# Patient Record
Sex: FEMALE | Race: WHITE | NOT HISPANIC OR LATINO | Employment: OTHER | RURAL
[De-identification: names, ages, dates, MRNs, and addresses within clinical notes are randomized per-mention and may not be internally consistent; named-entity substitution may affect disease eponyms.]

---

## 2021-11-19 ENCOUNTER — HOSPITAL ENCOUNTER (INPATIENT)
Facility: HOSPITAL | Age: 81
LOS: 17 days | Discharge: OTHER INSTITUTION | DRG: 194 | End: 2021-12-06
Attending: HOSPITALIST | Admitting: HOSPITALIST
Payer: MEDICARE

## 2021-11-19 DIAGNOSIS — I50.9 CONGESTIVE HEART FAILURE: ICD-10-CM

## 2021-11-19 DIAGNOSIS — J18.9 PNEUMONIA: ICD-10-CM

## 2021-11-19 DIAGNOSIS — I50.9 CHRONIC CONGESTIVE HEART FAILURE: ICD-10-CM

## 2021-11-19 DIAGNOSIS — J18.9 PNEUMONIA DUE TO INFECTIOUS ORGANISM, UNSPECIFIED LATERALITY, UNSPECIFIED PART OF LUNG: ICD-10-CM

## 2021-11-19 PROBLEM — I10 ESSENTIAL HYPERTENSION: Chronic | Status: ACTIVE | Noted: 2021-11-19

## 2021-11-19 PROBLEM — Z91.89 AT HIGH RISK FOR VENOUS THROMBOEMBOLISM (VTE): Status: ACTIVE | Noted: 2021-11-19

## 2021-11-19 LAB
FLUAV AG UPPER RESP QL IA.RAPID: NEGATIVE
FLUBV AG UPPER RESP QL IA.RAPID: NEGATIVE
SARS-COV+SARS-COV-2 AG RESP QL IA.RAPID: NEGATIVE

## 2021-11-19 PROCEDURE — 25000003 PHARM REV CODE 250: Performed by: NURSE PRACTITIONER

## 2021-11-19 PROCEDURE — 99900035 HC TECH TIME PER 15 MIN (STAT)

## 2021-11-19 PROCEDURE — 94640 AIRWAY INHALATION TREATMENT: CPT

## 2021-11-19 PROCEDURE — 11000004 HC SNF PRIVATE

## 2021-11-19 PROCEDURE — 25000003 PHARM REV CODE 250: Performed by: HOSPITALIST

## 2021-11-19 PROCEDURE — 25000242 PHARM REV CODE 250 ALT 637 W/ HCPCS: Performed by: NURSE PRACTITIONER

## 2021-11-19 PROCEDURE — 63600175 PHARM REV CODE 636 W HCPCS: Performed by: NURSE PRACTITIONER

## 2021-11-19 PROCEDURE — 27000958

## 2021-11-19 PROCEDURE — 94761 N-INVAS EAR/PLS OXIMETRY MLT: CPT

## 2021-11-19 PROCEDURE — 87428 SARSCOV & INF VIR A&B AG IA: CPT | Performed by: NURSE PRACTITIONER

## 2021-11-19 RX ORDER — ONDANSETRON 4 MG/1
4 TABLET, ORALLY DISINTEGRATING ORAL EVERY 6 HOURS PRN
Status: DISCONTINUED | OUTPATIENT
Start: 2021-11-19 | End: 2021-12-06 | Stop reason: HOSPADM

## 2021-11-19 RX ORDER — AMLODIPINE BESYLATE 10 MG/1
10 TABLET ORAL DAILY
Status: DISCONTINUED | OUTPATIENT
Start: 2021-11-19 | End: 2021-11-29

## 2021-11-19 RX ORDER — ACETAMINOPHEN 325 MG/1
650 TABLET ORAL EVERY 6 HOURS PRN
Status: DISCONTINUED | OUTPATIENT
Start: 2021-11-19 | End: 2021-11-21

## 2021-11-19 RX ORDER — ROPINIROLE 2 MG/1
2 TABLET, FILM COATED ORAL 3 TIMES DAILY
COMMUNITY

## 2021-11-19 RX ORDER — OXYBUTYNIN CHLORIDE 10 MG/1
10 TABLET, EXTENDED RELEASE ORAL DAILY
Status: ON HOLD | COMMUNITY
End: 2021-12-15 | Stop reason: HOSPADM

## 2021-11-19 RX ORDER — ENOXAPARIN SODIUM 100 MG/ML
40 INJECTION SUBCUTANEOUS EVERY 24 HOURS
Status: DISCONTINUED | OUTPATIENT
Start: 2021-11-19 | End: 2021-12-01

## 2021-11-19 RX ORDER — TALC
6 POWDER (GRAM) TOPICAL NIGHTLY PRN
Status: DISCONTINUED | OUTPATIENT
Start: 2021-11-19 | End: 2021-12-06 | Stop reason: HOSPADM

## 2021-11-19 RX ORDER — POTASSIUM CHLORIDE 20 MEQ/1
20 TABLET, EXTENDED RELEASE ORAL DAILY
Status: ON HOLD | COMMUNITY
End: 2021-12-15 | Stop reason: HOSPADM

## 2021-11-19 RX ORDER — CALCIUM CARBONATE 200(500)MG
500 TABLET,CHEWABLE ORAL 2 TIMES DAILY PRN
Status: DISCONTINUED | OUTPATIENT
Start: 2021-11-19 | End: 2021-12-06 | Stop reason: HOSPADM

## 2021-11-19 RX ORDER — HYDRALAZINE HYDROCHLORIDE 25 MG/1
25 TABLET, FILM COATED ORAL 3 TIMES DAILY
Status: DISCONTINUED | OUTPATIENT
Start: 2021-11-19 | End: 2021-12-06 | Stop reason: HOSPADM

## 2021-11-19 RX ORDER — IRBESARTAN 150 MG/1
150 TABLET ORAL DAILY
Status: ON HOLD | COMMUNITY
End: 2021-12-15 | Stop reason: HOSPADM

## 2021-11-19 RX ORDER — CELECOXIB 200 MG/1
200 CAPSULE ORAL 2 TIMES DAILY
Status: ON HOLD | COMMUNITY
End: 2021-12-15 | Stop reason: HOSPADM

## 2021-11-19 RX ORDER — AMOXICILLIN 250 MG
1 CAPSULE ORAL 2 TIMES DAILY PRN
Status: DISCONTINUED | OUTPATIENT
Start: 2021-11-19 | End: 2021-12-06 | Stop reason: HOSPADM

## 2021-11-19 RX ORDER — AMOXICILLIN AND CLAVULANATE POTASSIUM 875; 125 MG/1; MG/1
1 TABLET, FILM COATED ORAL EVERY 12 HOURS
Status: COMPLETED | OUTPATIENT
Start: 2021-11-19 | End: 2021-11-22

## 2021-11-19 RX ORDER — FUROSEMIDE 20 MG/1
20 TABLET ORAL DAILY
Status: ON HOLD | COMMUNITY
End: 2021-12-15 | Stop reason: HOSPADM

## 2021-11-19 RX ORDER — PANTOPRAZOLE SODIUM 40 MG/1
40 TABLET, DELAYED RELEASE ORAL DAILY
Status: DISCONTINUED | OUTPATIENT
Start: 2021-11-20 | End: 2021-12-02

## 2021-11-19 RX ORDER — OXYBUTYNIN CHLORIDE 5 MG/1
10 TABLET, EXTENDED RELEASE ORAL DAILY
Status: DISCONTINUED | OUTPATIENT
Start: 2021-11-20 | End: 2021-12-06 | Stop reason: HOSPADM

## 2021-11-19 RX ORDER — VENLAFAXINE HYDROCHLORIDE 150 MG/1
150 CAPSULE, EXTENDED RELEASE ORAL DAILY
COMMUNITY

## 2021-11-19 RX ORDER — BUDESONIDE 0.5 MG/2ML
0.5 INHALANT ORAL EVERY 12 HOURS
Status: DISCONTINUED | OUTPATIENT
Start: 2021-11-19 | End: 2021-12-06 | Stop reason: HOSPADM

## 2021-11-19 RX ORDER — ALBUTEROL SULFATE 0.83 MG/ML
2.5 SOLUTION RESPIRATORY (INHALATION) EVERY 4 HOURS PRN
Status: DISCONTINUED | OUTPATIENT
Start: 2021-11-19 | End: 2021-12-06 | Stop reason: HOSPADM

## 2021-11-19 RX ORDER — IPRATROPIUM BROMIDE AND ALBUTEROL SULFATE 2.5; .5 MG/3ML; MG/3ML
3 SOLUTION RESPIRATORY (INHALATION)
Status: DISCONTINUED | OUTPATIENT
Start: 2021-11-19 | End: 2021-12-06 | Stop reason: HOSPADM

## 2021-11-19 RX ORDER — VENLAFAXINE HYDROCHLORIDE 75 MG/1
150 CAPSULE, EXTENDED RELEASE ORAL DAILY
Status: DISCONTINUED | OUTPATIENT
Start: 2021-11-20 | End: 2021-12-02

## 2021-11-19 RX ORDER — ROPINIROLE 1 MG/1
2 TABLET, FILM COATED ORAL 3 TIMES DAILY
Status: DISCONTINUED | OUTPATIENT
Start: 2021-11-19 | End: 2021-12-06 | Stop reason: HOSPADM

## 2021-11-19 RX ORDER — ESTRADIOL 1 MG/1
1 TABLET ORAL DAILY
Status: ON HOLD | COMMUNITY
End: 2021-12-15 | Stop reason: HOSPADM

## 2021-11-19 RX ORDER — THYROID 90 MG/1
90 TABLET ORAL
Status: DISCONTINUED | OUTPATIENT
Start: 2021-11-20 | End: 2021-12-06 | Stop reason: HOSPADM

## 2021-11-19 RX ORDER — ESTRADIOL 0.5 MG/1
1 TABLET ORAL DAILY
Status: DISCONTINUED | OUTPATIENT
Start: 2021-11-20 | End: 2021-12-06 | Stop reason: HOSPADM

## 2021-11-19 RX ORDER — AMLODIPINE BESYLATE 5 MG/1
5 TABLET ORAL DAILY
Status: ON HOLD | COMMUNITY
End: 2021-12-15 | Stop reason: HOSPADM

## 2021-11-19 RX ORDER — THYROID 90 MG/1
TABLET ORAL
Status: ON HOLD | COMMUNITY
End: 2021-12-15 | Stop reason: HOSPADM

## 2021-11-19 RX ADMIN — IPRATROPIUM BROMIDE AND ALBUTEROL SULFATE 3 ML: .5; 3 SOLUTION RESPIRATORY (INHALATION) at 06:11

## 2021-11-19 RX ADMIN — HYDRALAZINE HYDROCHLORIDE 25 MG: 25 TABLET ORAL at 05:11

## 2021-11-19 RX ADMIN — HYDRALAZINE HYDROCHLORIDE 25 MG: 25 TABLET ORAL at 08:11

## 2021-11-19 RX ADMIN — ENOXAPARIN SODIUM 40 MG: 40 INJECTION SUBCUTANEOUS at 08:11

## 2021-11-19 RX ADMIN — BUDESONIDE 0.5 MG: 0.5 INHALANT ORAL at 07:11

## 2021-11-19 RX ADMIN — AMOXICILLIN AND CLAVULANATE POTASSIUM 1 TABLET: 875; 125 TABLET, FILM COATED ORAL at 08:11

## 2021-11-19 RX ADMIN — ONDANSETRON 4 MG: 4 TABLET, ORALLY DISINTEGRATING ORAL at 10:11

## 2021-11-19 RX ADMIN — ROPINIROLE 2 MG: 1 TABLET, FILM COATED ORAL at 05:11

## 2021-11-19 RX ADMIN — ROPINIROLE 2 MG: 1 TABLET, FILM COATED ORAL at 08:11

## 2021-11-19 NOTE — PLAN OF CARE
Problem: Adult Inpatient Plan of Care  Goal: Plan of Care Review  Outcome: Ongoing, Progressing  Goal: Patient-Specific Goal (Individualization)  Outcome: Ongoing, Progressing  Goal: Absence of Hospital-Acquired Illness or Injury  Outcome: Ongoing, Progressing  Goal: Optimal Comfort and Wellbeing  Outcome: Ongoing, Progressing  Goal: Readiness for Transition of Care  Outcome: Ongoing, Progressing  Goal: Rounds/Family Conference  Outcome: Ongoing, Progressing     Problem: Infection (Pneumonia)  Goal: Resolution of Infection Signs/Symptoms  Outcome: Ongoing, Progressing     Problem: UTI (Urinary Tract Infection)  Goal: Improved Infection Symptoms  Outcome: Ongoing, Progressing     Problem: Fall Injury Risk  Goal: Absence of Fall and Fall-Related Injury  Outcome: Ongoing, Progressing

## 2021-11-20 LAB
ANION GAP SERPL CALCULATED.3IONS-SCNC: 13 MMOL/L (ref 7–16)
BASOPHILS # BLD AUTO: 0.03 K/UL (ref 0–0.2)
BASOPHILS NFR BLD AUTO: 0.5 % (ref 0–1)
BASOPHILS NFR BLD MANUAL: 1 % (ref 0–1)
BUN SERPL-MCNC: 15 MG/DL (ref 7–18)
BUN/CREAT SERPL: 13 (ref 6–20)
CALCIUM SERPL-MCNC: 8.2 MG/DL (ref 8.5–10.1)
CHLORIDE SERPL-SCNC: 101 MMOL/L (ref 98–107)
CO2 SERPL-SCNC: 23 MMOL/L (ref 21–32)
CREAT SERPL-MCNC: 1.19 MG/DL (ref 0.55–1.02)
DIFFERENTIAL METHOD BLD: ABNORMAL
EOSINOPHIL # BLD AUTO: 0.11 K/UL (ref 0–0.5)
EOSINOPHIL NFR BLD AUTO: 1.8 % (ref 1–4)
EOSINOPHIL NFR BLD MANUAL: 4 % (ref 1–4)
ERYTHROCYTE [DISTWIDTH] IN BLOOD BY AUTOMATED COUNT: 14 % (ref 11.5–14.5)
GLUCOSE SERPL-MCNC: 100 MG/DL (ref 74–106)
HCT VFR BLD AUTO: 32.1 % (ref 38–47)
HGB BLD-MCNC: 10.7 G/DL (ref 12–16)
LYMPHOCYTES # BLD AUTO: 1.8 K/UL (ref 1–4.8)
LYMPHOCYTES NFR BLD AUTO: 30.3 % (ref 27–41)
LYMPHOCYTES NFR BLD MANUAL: 31 % (ref 27–41)
MCH RBC QN AUTO: 31.6 PG (ref 27–31)
MCHC RBC AUTO-ENTMCNC: 33.3 G/DL (ref 32–36)
MCV RBC AUTO: 94.7 FL (ref 80–96)
MONOCYTES # BLD AUTO: 0.74 K/UL (ref 0–0.8)
MONOCYTES NFR BLD AUTO: 12.4 % (ref 2–6)
MONOCYTES NFR BLD MANUAL: 11 % (ref 2–6)
MPC BLD CALC-MCNC: 8.9 FL (ref 9.4–12.4)
NEUTROPHILS # BLD AUTO: 3.27 K/UL (ref 1.8–7.7)
NEUTROPHILS NFR BLD AUTO: 55 % (ref 53–65)
NEUTS SEG NFR BLD MANUAL: 53 % (ref 50–62)
PLATELET # BLD AUTO: 242 K/UL (ref 150–400)
POTASSIUM SERPL-SCNC: 4 MMOL/L (ref 3.5–5.1)
RBC # BLD AUTO: 3.39 M/UL (ref 4.2–5.4)
SODIUM SERPL-SCNC: 133 MMOL/L (ref 136–145)
WBC # BLD AUTO: 5.95 K/UL (ref 4.5–11)

## 2021-11-20 PROCEDURE — 11000004 HC SNF PRIVATE

## 2021-11-20 PROCEDURE — 63600175 PHARM REV CODE 636 W HCPCS: Performed by: NURSE PRACTITIONER

## 2021-11-20 PROCEDURE — 27000958

## 2021-11-20 PROCEDURE — 27000221 HC OXYGEN, UP TO 24 HOURS

## 2021-11-20 PROCEDURE — 80048 BASIC METABOLIC PNL TOTAL CA: CPT | Performed by: NURSE PRACTITIONER

## 2021-11-20 PROCEDURE — 99900035 HC TECH TIME PER 15 MIN (STAT)

## 2021-11-20 PROCEDURE — 25000242 PHARM REV CODE 250 ALT 637 W/ HCPCS: Performed by: NURSE PRACTITIONER

## 2021-11-20 PROCEDURE — 36415 COLL VENOUS BLD VENIPUNCTURE: CPT | Performed by: NURSE PRACTITIONER

## 2021-11-20 PROCEDURE — 85025 COMPLETE CBC W/AUTO DIFF WBC: CPT | Performed by: NURSE PRACTITIONER

## 2021-11-20 PROCEDURE — 94761 N-INVAS EAR/PLS OXIMETRY MLT: CPT

## 2021-11-20 PROCEDURE — 25000003 PHARM REV CODE 250: Performed by: NURSE PRACTITIONER

## 2021-11-20 RX ADMIN — ROPINIROLE 2 MG: 1 TABLET, FILM COATED ORAL at 08:11

## 2021-11-20 RX ADMIN — AMOXICILLIN AND CLAVULANATE POTASSIUM 1 TABLET: 875; 125 TABLET, FILM COATED ORAL at 08:11

## 2021-11-20 RX ADMIN — ROPINIROLE 2 MG: 1 TABLET, FILM COATED ORAL at 02:11

## 2021-11-20 RX ADMIN — HYDRALAZINE HYDROCHLORIDE 25 MG: 25 TABLET ORAL at 02:11

## 2021-11-20 RX ADMIN — HYDRALAZINE HYDROCHLORIDE 25 MG: 25 TABLET ORAL at 08:11

## 2021-11-20 RX ADMIN — ESTRADIOL 1 MG: 0.5 TABLET ORAL at 08:11

## 2021-11-20 RX ADMIN — OXYBUTYNIN CHLORIDE 10 MG: 5 TABLET, EXTENDED RELEASE ORAL at 08:11

## 2021-11-20 RX ADMIN — ENOXAPARIN SODIUM 40 MG: 40 INJECTION SUBCUTANEOUS at 04:11

## 2021-11-20 RX ADMIN — ANTACID TABLETS 500 MG: 500 TABLET, CHEWABLE ORAL at 02:11

## 2021-11-20 RX ADMIN — AMLODIPINE BESYLATE 10 MG: 10 TABLET ORAL at 08:11

## 2021-11-20 RX ADMIN — PANTOPRAZOLE SODIUM 40 MG: 40 TABLET, DELAYED RELEASE ORAL at 08:11

## 2021-11-20 RX ADMIN — VENLAFAXINE HYDROCHLORIDE 150 MG: 75 CAPSULE, EXTENDED RELEASE ORAL at 08:11

## 2021-11-20 NOTE — NURSING
Patient called out experiencing SOB at approximately 1820. O 2 sat 90% on room air. ER practitioner notified. Chest xray ordered along with CBC and BMP. With duobebs and incentive spirometry. Will continue to monitor. No distress noted

## 2021-11-20 NOTE — HOSPITAL COURSE
11/24 Feels groggy this am but no SOB.  Wants to go on pass.  No major issues.   11/26 Patient and family stating patient doesn't seem to be improving and peripheral edema increasing. Review of patient chart indicates a 4 lb weight game over past 2 days. Potassium level down to 3.0. Potassium supplement was changed from tablet to liquid because patient states trouble swallowing tablet; however, nurse states patient does not always drink all her potassium supplement.  11/29 Audra RN reports Ms Pickens and her daughter have concerns today.  Ms Pickens was found resting in bed.  She reports of continued swelling in bilateral legs and thinks may be caused by her BP med being changed from irbesartan to amlodipine.  Also c/o feeling more weak every day. Telemed Consult via audio/ video with Dr Buck via audio/ video, she agrees with changing BP med to ARB and request lab today to include BNP, B12, Vitamin D and TSH.    11/30 asked to see patient for increased edema, inc SOB and O2 use  12/1 States she doesn't feel good, but no other details.  Weight about the same.  Good diuresis overnight.   12/2 Na level still dropping despite fluid restrictions, breathing seems stable   12/03 called to room by JONE Evans LPN, pt reports she fell on bedside table. Pt was found sitting on side of bed when bed alarm activated. She has a bruise noted to the back of right upper arm with a 1 cm skin tear.  The would was cleaned and dressed.  She reports pain to upper arm. Will Xray to r/o fracture.     12/3 A couple of falls now.  Coccyx was fractured from fall on bottom, Arm xrays neg for fracture.   12/04 0730 pt with noted periorbital edema and edema to face and neck.  Reports SOB.  Dr. Valencia was notified by staff and has ordered CT.  Discussed care with Dr. Valencia who asked that we check CT results and consult with on-call MD  12/6 States her stomach is upset this am, breathing seems stable.  Weight is up and Na level is down again  today.    12/6/21 1520 rec'd notification from [facility]  Pt notified and is still agreeable for transfer to further evaluate hyponatremia and pleural effusions.  Dr. Valencia notified.     12/06/21 1556 Dr. Grady called and accepts patient to [facility]  12/06/21 1659 Kristen with [facility] transfer  reports Dr. Grady request patient to go to specialty due to room constraints.  Ladan with Specialty states patient will go to Room 140.

## 2021-11-20 NOTE — SUBJECTIVE & OBJECTIVE
Past Medical History:   Diagnosis Date    Anxiety     Ocasio's esophagus     Depression     GERD (gastroesophageal reflux disease)     Hypothyroidism        Past Surgical History:   Procedure Laterality Date    CATARACT EXTRACTION      CHOLECYSTECTOMY      HYSTERECTOMY         Review of patient's allergies indicates:   Allergen Reactions    Codeine     Lisinopril     Opioids - morphine analogues        No current facility-administered medications on file prior to encounter.     Current Outpatient Medications on File Prior to Encounter   Medication Sig    amLODIPine (NORVASC) 5 MG tablet Take 5 mg by mouth once daily.    celecoxib (CELEBREX) 200 MG capsule Take 200 mg by mouth 2 (two) times daily.    estradioL (ESTRACE) 1 MG tablet Take 1 mg by mouth once daily.    furosemide (LASIX) 20 MG tablet Take 20 mg by mouth once daily.    irbesartan (AVAPRO) 150 MG tablet Take 150 mg by mouth once daily.    oxybutynin (DITROPAN-XL) 10 MG 24 hr tablet Take 10 mg by mouth once daily.    potassium chloride SA (K-DUR,KLOR-CON) 20 MEQ tablet Take 20 mEq by mouth once daily.    rOPINIRole (REQUIP) 2 MG tablet Take 2 mg by mouth 3 (three) times daily.    thyroid, pork, (ARMOUR THYROID) 90 mg Tab Take by mouth before breakfast.    venlafaxine (EFFEXOR-XR) 150 MG Cp24 Take 150 mg by mouth once daily.     Family History    None       Tobacco Use    Smoking status: Never Smoker    Smokeless tobacco: Never Used   Substance and Sexual Activity    Alcohol use: Not Currently    Drug use: Not Currently    Sexual activity: Not Currently     Review of Systems   Constitutional: Negative for chills, fatigue and fever.   HENT: Positive for congestion.    Respiratory: Positive for shortness of breath. Negative for chest tightness.         C/o SOB at times.    Cardiovascular: Negative for chest pain.   Genitourinary: Negative.    Musculoskeletal: Negative.    Skin: Negative for color change.   Neurological: Negative for  dizziness.     Objective:     Vital Signs (Most Recent):  Temp: 98.4 °F (36.9 °C) (11/19/21 1550)  Pulse: 90 (11/19/21 1550)  Resp: 20 (11/19/21 1550)  BP: (!) 159/54 (11/19/21 1550)  SpO2: (!) 93 % (11/19/21 1550) Vital Signs (24h Range):  Temp:  [98.4 °F (36.9 °C)] 98.4 °F (36.9 °C)  Pulse:  [90] 90  Resp:  [20] 20  SpO2:  [93 %] 93 %  BP: (159)/(54) 159/54     Weight: 74.8 kg (164 lb 14.4 oz)  Body mass index is 30.16 kg/m².    Physical Exam  Vitals and nursing note reviewed.   Constitutional:       General: She is awake. She is not in acute distress.     Appearance: Normal appearance. She is overweight.   HENT:      Head: Normocephalic and atraumatic.      Mouth/Throat:      Mouth: Mucous membranes are moist.   Eyes:      General: No scleral icterus.     Extraocular Movements: Extraocular movements intact.      Conjunctiva/sclera: Conjunctivae normal.      Pupils: Pupils are equal, round, and reactive to light.   Cardiovascular:      Rate and Rhythm: Normal rate and regular rhythm.      Heart sounds: No murmur heard.  No friction rub. No gallop.    Pulmonary:      Effort: Pulmonary effort is normal. No respiratory distress.      Breath sounds: No rales.      Comments: Diminished breath sounds bilaterally, no major wheeze.   Abdominal:      General: Abdomen is flat. Bowel sounds are normal. There is no distension.      Palpations: Abdomen is soft.      Tenderness: There is no abdominal tenderness. There is no guarding.   Musculoskeletal:         General: No swelling or tenderness.      Cervical back: Neck supple.   Skin:     General: Skin is warm and dry.      Capillary Refill: Capillary refill takes less than 2 seconds.      Coloration: Skin is not jaundiced.      Findings: No rash.   Neurological:      General: No focal deficit present.      Mental Status: She is alert and oriented to person, place, and time.      Sensory: No sensory deficit.      Motor: No weakness.   Psychiatric:         Mood and Affect: Mood  normal.         Thought Content: Thought content normal.         Judgment: Judgment normal.           CRANIAL NERVES     CN III, IV, VI   Pupils are equal, round, and reactive to light.       Significant Labs: All pertinent labs within the past 24 hours have been reviewed.    Significant Imaging: I have reviewed all pertinent imaging results/findings within the past 24 hours.

## 2021-11-20 NOTE — PLAN OF CARE
Problem: Adult Inpatient Plan of Care  Goal: Plan of Care Review  Outcome: Ongoing, Progressing  Goal: Patient-Specific Goal (Individualization)  Outcome: Ongoing, Progressing  Goal: Absence of Hospital-Acquired Illness or Injury  Outcome: Ongoing, Progressing  Goal: Optimal Comfort and Wellbeing  Outcome: Ongoing, Progressing  Goal: Readiness for Transition of Care  Outcome: Ongoing, Progressing  Goal: Rounds/Family Conference  Outcome: Ongoing, Progressing     Problem: Infection (Pneumonia)  Goal: Resolution of Infection Signs/Symptoms  Outcome: Ongoing, Progressing     Problem: UTI (Urinary Tract Infection)  Goal: Improved Infection Symptoms  Outcome: Ongoing, Progressing     Problem: Fall Injury Risk  Goal: Absence of Fall and Fall-Related Injury  Outcome: Ongoing, Progressing     Problem: Skin Injury Risk Increased  Goal: Skin Health and Integrity  Outcome: Ongoing, Progressing

## 2021-11-20 NOTE — HPI
80 yr old Female admitted for swing-bed following an admit at [facility] with ICU stay since 11/15 for acute heart failure, hypoxemic resp failure, acute renal failure, hyperkalemia, hyponatremia, lactic acidosis, sepsis and anemia.  She continues to take PO augmentin for pneumonia.  Ms Pickens has collins past medical hx of HTN, hypothyroidism, hyperlipidemia, and restless leg syndrome.  She is admitted for medical management and rehab.

## 2021-11-20 NOTE — PLAN OF CARE
Problem: Adult Inpatient Plan of Care  Goal: Absence of Hospital-Acquired Illness or Injury  Outcome: Ongoing, Progressing  Goal: Optimal Comfort and Wellbeing  Outcome: Ongoing, Progressing     Problem: UTI (Urinary Tract Infection)  Goal: Improved Infection Symptoms  Outcome: Ongoing, Progressing     Problem: Fall Injury Risk  Goal: Absence of Fall and Fall-Related Injury  Outcome: Ongoing, Progressing

## 2021-11-20 NOTE — CONSULTS
Pt admit for swing bed. Pt slight SOB. Sao2  94% at rest occasional wheeze slightly coarse  BBS. Orders noted. Respiratory treatments given with incentive spirometer.. Tolerated well with good comprehension.  BBS improved with nonproductive coughs.  Chest xray reviewed.

## 2021-11-21 PROCEDURE — 94640 AIRWAY INHALATION TREATMENT: CPT

## 2021-11-21 PROCEDURE — 27000958

## 2021-11-21 PROCEDURE — 25000003 PHARM REV CODE 250: Performed by: NURSE PRACTITIONER

## 2021-11-21 PROCEDURE — 25000242 PHARM REV CODE 250 ALT 637 W/ HCPCS: Performed by: NURSE PRACTITIONER

## 2021-11-21 PROCEDURE — 94761 N-INVAS EAR/PLS OXIMETRY MLT: CPT

## 2021-11-21 PROCEDURE — 99900035 HC TECH TIME PER 15 MIN (STAT)

## 2021-11-21 PROCEDURE — 63600175 PHARM REV CODE 636 W HCPCS: Performed by: NURSE PRACTITIONER

## 2021-11-21 PROCEDURE — 25000003 PHARM REV CODE 250: Performed by: HOSPITALIST

## 2021-11-21 PROCEDURE — 27000221 HC OXYGEN, UP TO 24 HOURS

## 2021-11-21 PROCEDURE — 11000004 HC SNF PRIVATE

## 2021-11-21 RX ORDER — ACETAMINOPHEN 325 MG/1
650 TABLET ORAL EVERY 6 HOURS PRN
Status: DISCONTINUED | OUTPATIENT
Start: 2021-11-21 | End: 2021-11-21

## 2021-11-21 RX ORDER — ACETAMINOPHEN 325 MG/1
650 TABLET ORAL EVERY 6 HOURS PRN
Status: DISCONTINUED | OUTPATIENT
Start: 2021-11-21 | End: 2021-12-06 | Stop reason: HOSPADM

## 2021-11-21 RX ADMIN — ACETAMINOPHEN 650 MG: 325 TABLET ORAL at 11:11

## 2021-11-21 RX ADMIN — HYDRALAZINE HYDROCHLORIDE 25 MG: 25 TABLET ORAL at 02:11

## 2021-11-21 RX ADMIN — VENLAFAXINE HYDROCHLORIDE 150 MG: 75 CAPSULE, EXTENDED RELEASE ORAL at 08:11

## 2021-11-21 RX ADMIN — ENOXAPARIN SODIUM 40 MG: 40 INJECTION SUBCUTANEOUS at 05:11

## 2021-11-21 RX ADMIN — ESTRADIOL 1 MG: 0.5 TABLET ORAL at 08:11

## 2021-11-21 RX ADMIN — HYDRALAZINE HYDROCHLORIDE 25 MG: 25 TABLET ORAL at 08:11

## 2021-11-21 RX ADMIN — OXYBUTYNIN CHLORIDE 10 MG: 5 TABLET, EXTENDED RELEASE ORAL at 08:11

## 2021-11-21 RX ADMIN — ROPINIROLE 2 MG: 1 TABLET, FILM COATED ORAL at 02:11

## 2021-11-21 RX ADMIN — PANTOPRAZOLE SODIUM 40 MG: 40 TABLET, DELAYED RELEASE ORAL at 08:11

## 2021-11-21 RX ADMIN — IPRATROPIUM BROMIDE AND ALBUTEROL SULFATE 3 ML: .5; 3 SOLUTION RESPIRATORY (INHALATION) at 07:11

## 2021-11-21 RX ADMIN — BUDESONIDE 0.5 MG: 0.5 INHALANT ORAL at 07:11

## 2021-11-21 RX ADMIN — ROPINIROLE 2 MG: 1 TABLET, FILM COATED ORAL at 08:11

## 2021-11-21 RX ADMIN — AMOXICILLIN AND CLAVULANATE POTASSIUM 1 TABLET: 875; 125 TABLET, FILM COATED ORAL at 08:11

## 2021-11-21 RX ADMIN — AMLODIPINE BESYLATE 10 MG: 10 TABLET ORAL at 08:11

## 2021-11-21 RX ADMIN — THYROID 90 MG: 90 TABLET ORAL at 05:11

## 2021-11-21 NOTE — PLAN OF CARE
Goal for resolution of Pneumonia by 11-. Encourage Incentive spirometer to help with deep breathing.

## 2021-11-22 PROCEDURE — 11000004 HC SNF PRIVATE

## 2021-11-22 PROCEDURE — 27000958

## 2021-11-22 PROCEDURE — 97163 PT EVAL HIGH COMPLEX 45 MIN: CPT

## 2021-11-22 PROCEDURE — 27000221 HC OXYGEN, UP TO 24 HOURS

## 2021-11-22 PROCEDURE — 99305 1ST NF CARE MODERATE MDM 35: CPT | Mod: AI,,, | Performed by: HOSPITALIST

## 2021-11-22 PROCEDURE — 97165 OT EVAL LOW COMPLEX 30 MIN: CPT

## 2021-11-22 PROCEDURE — 99900035 HC TECH TIME PER 15 MIN (STAT)

## 2021-11-22 PROCEDURE — 25000242 PHARM REV CODE 250 ALT 637 W/ HCPCS: Performed by: NURSE PRACTITIONER

## 2021-11-22 PROCEDURE — 63600175 PHARM REV CODE 636 W HCPCS: Performed by: NURSE PRACTITIONER

## 2021-11-22 PROCEDURE — 25000003 PHARM REV CODE 250: Performed by: NURSE PRACTITIONER

## 2021-11-22 PROCEDURE — 99305 PR NURSING FACILITY CARE, INIT, MOD SEVERITY: ICD-10-PCS | Mod: AI,,, | Performed by: HOSPITALIST

## 2021-11-22 PROCEDURE — 25000003 PHARM REV CODE 250: Performed by: HOSPITALIST

## 2021-11-22 PROCEDURE — 97110 THERAPEUTIC EXERCISES: CPT

## 2021-11-22 PROCEDURE — 94640 AIRWAY INHALATION TREATMENT: CPT

## 2021-11-22 PROCEDURE — 94761 N-INVAS EAR/PLS OXIMETRY MLT: CPT

## 2021-11-22 RX ORDER — FUROSEMIDE 40 MG/1
40 TABLET ORAL DAILY
Status: DISCONTINUED | OUTPATIENT
Start: 2021-11-22 | End: 2021-11-26

## 2021-11-22 RX ADMIN — VENLAFAXINE HYDROCHLORIDE 150 MG: 75 CAPSULE, EXTENDED RELEASE ORAL at 09:11

## 2021-11-22 RX ADMIN — BUDESONIDE 0.5 MG: 0.5 INHALANT ORAL at 07:11

## 2021-11-22 RX ADMIN — ENOXAPARIN SODIUM 40 MG: 40 INJECTION SUBCUTANEOUS at 04:11

## 2021-11-22 RX ADMIN — ROPINIROLE 2 MG: 1 TABLET, FILM COATED ORAL at 09:11

## 2021-11-22 RX ADMIN — HYDRALAZINE HYDROCHLORIDE 25 MG: 25 TABLET ORAL at 09:11

## 2021-11-22 RX ADMIN — AMOXICILLIN AND CLAVULANATE POTASSIUM 1 TABLET: 875; 125 TABLET, FILM COATED ORAL at 09:11

## 2021-11-22 RX ADMIN — ESTRADIOL 1 MG: 0.5 TABLET ORAL at 09:11

## 2021-11-22 RX ADMIN — IPRATROPIUM BROMIDE AND ALBUTEROL SULFATE 3 ML: .5; 3 SOLUTION RESPIRATORY (INHALATION) at 07:11

## 2021-11-22 RX ADMIN — HYDRALAZINE HYDROCHLORIDE 25 MG: 25 TABLET ORAL at 08:11

## 2021-11-22 RX ADMIN — IPRATROPIUM BROMIDE AND ALBUTEROL SULFATE 3 ML: .5; 3 SOLUTION RESPIRATORY (INHALATION) at 08:11

## 2021-11-22 RX ADMIN — ROPINIROLE 2 MG: 1 TABLET, FILM COATED ORAL at 08:11

## 2021-11-22 RX ADMIN — THYROID 90 MG: 90 TABLET ORAL at 05:11

## 2021-11-22 RX ADMIN — BUDESONIDE 0.5 MG: 0.5 INHALANT ORAL at 08:11

## 2021-11-22 RX ADMIN — IPRATROPIUM BROMIDE AND ALBUTEROL SULFATE 3 ML: .5; 3 SOLUTION RESPIRATORY (INHALATION) at 01:11

## 2021-11-22 RX ADMIN — ACETAMINOPHEN 650 MG: 325 TABLET ORAL at 04:11

## 2021-11-22 RX ADMIN — SENNOSIDES AND DOCUSATE SODIUM 1 TABLET: 8.6; 5 TABLET ORAL at 01:11

## 2021-11-22 RX ADMIN — PANTOPRAZOLE SODIUM 40 MG: 40 TABLET, DELAYED RELEASE ORAL at 09:11

## 2021-11-22 RX ADMIN — FUROSEMIDE 40 MG: 40 TABLET ORAL at 05:11

## 2021-11-22 RX ADMIN — OXYBUTYNIN CHLORIDE 10 MG: 5 TABLET, EXTENDED RELEASE ORAL at 09:11

## 2021-11-22 RX ADMIN — AMLODIPINE BESYLATE 10 MG: 10 TABLET ORAL at 09:11

## 2021-11-22 RX ADMIN — ROPINIROLE 2 MG: 1 TABLET, FILM COATED ORAL at 03:11

## 2021-11-22 RX ADMIN — HYDRALAZINE HYDROCHLORIDE 25 MG: 25 TABLET ORAL at 03:11

## 2021-11-22 NOTE — PLAN OF CARE
Problem: Adult Inpatient Plan of Care  Goal: Plan of Care Review  Outcome: Ongoing, Progressing  Goal: Patient-Specific Goal (Individualization)  Outcome: Ongoing, Progressing  Goal: Absence of Hospital-Acquired Illness or Injury  Outcome: Ongoing, Progressing  Goal: Optimal Comfort and Wellbeing  Outcome: Ongoing, Progressing  Goal: Readiness for Transition of Care  Outcome: Ongoing, Progressing  Goal: Rounds/Family Conference  Outcome: Ongoing, Progressing     Problem: Infection (Pneumonia)  Goal: Resolution of Infection Signs/Symptoms  Outcome: Ongoing, Progressing     Problem: UTI (Urinary Tract Infection)  Goal: Improved Infection Symptoms  Outcome: Ongoing, Progressing

## 2021-11-22 NOTE — PT/OT/SLP EVAL
Occupational Therapy   Evaluation    Name: Courtney Pickens  MRN: [id number]  Admitting Diagnosis:  <principal problem not specified>  Recent Surgery: * No surgery found *      Recommendations:     Discharge Recommendations: home with home health  Discharge Equipment Recommendations:  rollator  Barriers to discharge:  None    Assessment:     Courtney Pickens is a 81 y.o. female with a medical diagnosis of <principal problem not specified>.  She presents with SOB as she completes tasks within her hospital room. Performance deficits affecting function: impaired endurance,weakness,impaired self care skills.      Rehab Prognosis: Good; patient would benefit from  skilled OT services to address these deficits and reach maximum level of function.       Plan:     Patient to be seen 5 x/week to address the above listed problems via self-care/home management,community/work re-entry,therapeutic activities,therapeutic exercises,therapeutic groups  · Plan of Care Expires: 12/03/21  · Plan of Care Reviewed with: patient    Subjective     Chief Complaint: SOB  Patient/Family Comments/goals: get home, get back to normal routine    Occupational Profile:  Living Environment: home alone  Previous level of function: independent, no walking devices but shower chair for bathing  Roles and Routines: mother, grandmother  Equipment Used at Home:  shower chair,grab bar  Assistance upon Discharge: home alone, family intermittently supervises    Pain/Comfort:  · Pain Rating 1: 0/10    Patients cultural, spiritual, Yazidism conflicts given the current situation: no    Objective:     Communicated with: pt prior to session.  Patient found up on side of bed, having just finished breakfast with  (no airway or catheter lines) upon OT entry to room.    General Precautions: Standard, fall   Orthopedic Precautions:Full weight bearing   Braces: N/A  Respiratory Status::   · O2 Device (Oxygen Therapy): room air    Occupational Performance:    Bed  Mobility:    · Mod i    Functional Mobility/Transfers:  · Patient completed Sit <> Stand Transfer with modified independence and supervision  with  observation and safety cues as needed   · Functional Mobility: cruises objects through room for safest dynamic balance    Activities of Daily Living:  · Feeding:  independence no AE  · Grooming: independence no AE  · Bathing: modified independence at sinkside  · Upper Body Dressing: independence no AE  · Lower Body Dressing: minimum assistance very minimal assist to get pants onto L foot due to feet swelling  · Toileting: independence changing pullup and managing clothing    Cognitive/Visual Perceptual:  Cognitive/Psychosocial Skills:     -       Oriented to: Person, Place, Time and Situation   -       Follows Commands/attention:Follows multistep  commands  -       Communication: clear/fluent  -       Memory: No Deficits noted  -       Safety awareness/insight to disability: intact   -       Mood/Affect/Coping skills/emotional control: Appropriate to situation, Cooperative and Pleasant    Physical Exam:  Balance:    -       fair + overall  Dominant hand:    -       right  Upper Extremity Range of Motion:     -       Right Upper Extremity: WFL  -       Left Upper Extremity: WFL  Upper Extremity Strength:    -       Right Upper Extremity: -4/5  -       Left Upper Extremity: -4/5   Strength:    -       Right Upper Extremity: -4/5  -       Left Upper Extremity: -4/5  Fine Motor Coordination:    -       Intact    AMPAC 6 Click ADL:  AMPAC Total Score: 22    Treatment & Education:  eval only today  Education:    Patient left EOB awaiting RT, call bell in reach with call button in reach    GOALS:   Multidisciplinary Problems     Occupational Therapy Goals        Problem: Occupational Therapy Goal    Goal Priority Disciplines Outcome Interventions   Occupational Therapy Goal     OT, PT/OT Ongoing, Progressing    Description: Goals to be met by: d/c     Patient will increase  functional independence with ADLs by performing:    LE Dressing with [place].  Increased functional strength to WFL for all ADL tasks.  Upper extremity exercise program x30 reps per handout, with independence completing chair seated exercises.                     History:     Past Medical History:   Diagnosis Date    Anxiety     Ocasio's esophagus     Depression     GERD (gastroesophageal reflux disease)     Hypothyroidism        Past Surgical History:   Procedure Laterality Date    CATARACT EXTRACTION      CHOLECYSTECTOMY      HYSTERECTOMY         Time Tracking:     OT Date of Treatment: 11/22/21  OT Start Time: 0745  OT Stop Time: 0800  OT Total Time (min): 15 min    Billable Minutes:Evaluation 15    11/22/2021

## 2021-11-22 NOTE — PT/OT/SLP EVAL
Physical Therapy Evaluation    Patient Name:  Courtney Pickens   MRN:  [id number]    Recommendations:     Discharge Recommendations:  home   Discharge Equipment Recommendations: none   Barriers to discharge: None    Assessment:     Courtney Pickens is a 81 y.o. female admitted with a medical diagnosis of Pneumonia.  She presents with the following impairments/functional limitations:  weakness,impaired endurance,gait instability,impaired balance .    Rehab Prognosis: Good; patient would benefit from acute skilled PT services to address these deficits and reach maximum level of function.    Recent Surgery: * No surgery found *      Plan:     During this hospitalization, patient to be seen 5 x/week to address the identified rehab impairments via   and progress toward the following goals:    · Plan of Care Expires:  12/24/21    Subjective     Chief Complaint: I got short of breath  Patient/Family Comments/goals: I want to go home!  Pain/Comfort:  · Pain Rating 1: 0/10  · Pain Rating Post-Intervention 1: 0/10  · Pain Rating 2: 0/10  · Pain Rating Post-Intervention 2: 0/10    Patients cultural, spiritual, Restoration conflicts given the current situation:      Living Environment:    Prior to admission, patients level of function was independent and lives alone.  Equipment used at home: none.    Upon discharge, patient will have assistance from daughter.    Objective:     Communicated with nursing prior to session.  Patient found up in chair with oxygen  upon PT entry to room.    General Precautions: Standard, fall   Orthopedic Precautions:N/A   Braces: N/A  Respiratory Status:  · Flow (L/min): 2    Exams:  · Cognitive Exam:  Patient is oriented to Person, Place, Time and Situation  · RLE ROM: WFL  · RLE Strength: Deficits: 4/5  · LLE ROM: WFL  · LLE Strength: Deficits: 4/5    Functional Mobility:  Bed Mobility:     Supine to Sit: independence  Sit to Supine: independence  Transfers:     Sit to Stand:  contact guard assistance  with hand-held assist  Bed to Chair: contact guard assistance with  hand-held assist  using  Stand Pivot  Gait: 30', Cgax1, no assistive device, slow jason  Wheelchair Propulsion:  Pt propelled Standard wheelchair x 15 feet on Level tile with  Bilateral upper extremity with Supervision or Set-up Assistance.   Tinetti Total Score: 19  < 18 = High Risk of Falls, 19-23 = Moderate Risk of Falls, > 24 = Low Risk of Falls    Therapeutic Activities and Exercises:     Exercises 19839    Heelslide    LAQ    Hip ADD/ABD    Bike 15'   Nustep    sit <>stand            Activities 24915    Nustep    WC self propulsion    transfer training        Gait          AM-PAC 6 CLICK MOBILITY  Total Score:21     Patient left up in chair with call button in reach.    GOALS:   Multidisciplinary Problems     Physical Therapy Goals        Problem: Physical Therapy Goal    Goal Priority Disciplines Outcome Goal Variances Interventions   Physical Therapy Goal     PT, PT/OT Ongoing, Progressing     Description:   Goals     Short Term Goals  2 weeks    1) I HOME EXERCISE PROGRAM  2) I in room transfers  3) Gait 500 feet with no device  and No assistance  4) I Wheel Chair skills    Long Term Goals  4 weeks    1) Discharge to home  2) tolerate 45 mins of activity  3) up/down 10 steps I with rail                      History:     Past Medical History:   Diagnosis Date    Anxiety     Ocasio's esophagus     Depression     GERD (gastroesophageal reflux disease)     Hypothyroidism        Past Surgical History:   Procedure Laterality Date    CATARACT EXTRACTION      CHOLECYSTECTOMY      HYSTERECTOMY         Time Tracking:     PT Received On: 11/22/21  PT Start Time: 1115     PT Stop Time: 1145  PT Total Time (min): 30 min     Billable Minutes: Evaluation 15 and Therapeutic Exercise 15      11/22/2021

## 2021-11-22 NOTE — PLAN OF CARE
Problem: Occupational Therapy Goal  Goal: Occupational Therapy Goal  Description: Goals to be met by: d/c     Patient will increase functional independence with ADLs by performing:    LE Dressing with [place].  Increased functional strength to WFL for all ADL tasks.  Upper extremity exercise program x30 reps per handout, with independence completing chair seated exercises.    Outcome: Ongoing, Progressing

## 2021-11-22 NOTE — PLAN OF CARE
Ms. Pickens arrived from [facility] for swing bed services following an admission for acute respiratory failure with hypoxia/heart failure. Ms. Pickens lives independently at home and has adult children that live close. States she has a straight cane that she uses intermittently, shower chair and her son is installing grab bars in the bathroom. She is followed by DELPH[facility] Mccall in [place] and has been seen within the past month. [facility] in [place], [place] is preferred pharmacy. Has had home health services in the past and would like to use [facility] if home health services are needed at time of discharge. Questions/concerns addressed. Will assess discharge needs throughout the course of stay.

## 2021-11-22 NOTE — H&P
[facility] Surgical Unit  [facility] Medicine  History & Physical    Patient Name: Courtney Pickens  MRN: [id number]  Patient Class: IP- Swing  Admission Date: 11/19/2021  Attending Physician: Austin Valencia DO   Primary Care Provider: Primary Doctor No         Patient information was obtained from patient, past medical records and ER records.     Subjective:     Principal Problem:Pneumonia    Chief Complaint: No chief complaint on file.       HPI: 80 yr old Female admitted for swing-bed following an admit at [facility] with ICU stay since 11/15 for acute heart failure, hypoxemic resp failure, acute renal failure, hyperkalemia, hyponatremia, lactic acidosis, sepsis and anemia.  She continues to take PO augmentin for pneumonia.  Ms Pickens has collins past medical hx of HTN, hypothyroidism, hyperlipidemia, and restless leg syndrome.  She is admitted for medical management and rehab.        Past Medical History:   Diagnosis Date    Anxiety     Ocasio's esophagus     Depression     GERD (gastroesophageal reflux disease)     Hypothyroidism        Past Surgical History:   Procedure Laterality Date    CATARACT EXTRACTION      CHOLECYSTECTOMY      HYSTERECTOMY         Review of patient's allergies indicates:   Allergen Reactions    Codeine     Lisinopril     Opioids - morphine analogues        No current facility-administered medications on file prior to encounter.     Current Outpatient Medications on File Prior to Encounter   Medication Sig    amLODIPine (NORVASC) 5 MG tablet Take 5 mg by mouth once daily.    celecoxib (CELEBREX) 200 MG capsule Take 200 mg by mouth 2 (two) times daily.    estradioL (ESTRACE) 1 MG tablet Take 1 mg by mouth once daily.    furosemide (LASIX) 20 MG tablet Take 20 mg by mouth once daily.    irbesartan (AVAPRO) 150 MG tablet Take 150 mg by mouth once daily.    oxybutynin (DITROPAN-XL) 10 MG 24 hr tablet Take 10 mg by mouth once daily.    potassium chloride SA  (K-DUR,KLOR-CON) 20 MEQ tablet Take 20 mEq by mouth once daily.    rOPINIRole (REQUIP) 2 MG tablet Take 2 mg by mouth 3 (three) times daily.    thyroid, pork, (ARMOUR THYROID) 90 mg Tab Take by mouth before breakfast.    venlafaxine (EFFEXOR-XR) 150 MG Cp24 Take 150 mg by mouth once daily.     Family History    None       Tobacco Use    Smoking status: Never Smoker    Smokeless tobacco: Never Used   Substance and Sexual Activity    Alcohol use: Not Currently    Drug use: Not Currently    Sexual activity: Not Currently     Review of Systems   Constitutional: Negative for chills, fatigue and fever.   HENT: Positive for congestion.    Respiratory: Positive for shortness of breath. Negative for chest tightness.         C/o SOB at times.    Cardiovascular: Negative for chest pain.   Genitourinary: Negative.    Musculoskeletal: Negative.    Skin: Negative for color change.   Neurological: Negative for dizziness.     Objective:     Vital Signs (Most Recent):  Temp: 98.4 °F (36.9 °C) (11/19/21 1550)  Pulse: 90 (11/19/21 1550)  Resp: 20 (11/19/21 1550)  BP: (!) 159/54 (11/19/21 1550)  SpO2: (!) 93 % (11/19/21 1550) Vital Signs (24h Range):  Temp:  [98.4 °F (36.9 °C)] 98.4 °F (36.9 °C)  Pulse:  [90] 90  Resp:  [20] 20  SpO2:  [93 %] 93 %  BP: (159)/(54) 159/54     Weight: 74.8 kg (164 lb 14.4 oz)  Body mass index is 30.16 kg/m².    Physical Exam  Vitals and nursing note reviewed.   Constitutional:       General: She is awake. She is not in acute distress.     Appearance: Normal appearance. She is overweight.   HENT:      Head: Normocephalic and atraumatic.      Mouth/Throat:      Mouth: Mucous membranes are moist.   Eyes:      General: No scleral icterus.     Extraocular Movements: Extraocular movements intact.      Conjunctiva/sclera: Conjunctivae normal.      Pupils: Pupils are equal, round, and reactive to light.   Cardiovascular:      Rate and Rhythm: Normal rate and regular rhythm.      Heart sounds: No murmur  heard.  No friction rub. No gallop.    Pulmonary:      Effort: Pulmonary effort is normal. No respiratory distress.      Breath sounds: No rales.      Comments: Diminished breath sounds bilaterally, no major wheeze.   Abdominal:      General: Abdomen is flat. Bowel sounds are normal. There is no distension.      Palpations: Abdomen is soft.      Tenderness: There is no abdominal tenderness. There is no guarding.   Musculoskeletal:         General: No swelling or tenderness.      Cervical back: Neck supple.   Skin:     General: Skin is warm and dry.      Capillary Refill: Capillary refill takes less than 2 seconds.      Coloration: Skin is not jaundiced.      Findings: No rash.   Neurological:      General: No focal deficit present.      Mental Status: She is alert and oriented to person, place, and time.      Sensory: No sensory deficit.      Motor: No weakness.   Psychiatric:         Mood and Affect: Mood normal.         Thought Content: Thought content normal.         Judgment: Judgment normal.           CRANIAL NERVES     CN III, IV, VI   Pupils are equal, round, and reactive to light.       Significant Labs: All pertinent labs within the past 24 hours have been reviewed.    Significant Imaging: I have reviewed all pertinent imaging results/findings within the past 24 hours.    Assessment/Plan:     * Pneumonia  Continue PO per recs from previous hospital.       Essential hypertension    Continue home meds.  Monitor vitals.    Chronic congestive heart failure  Daily weights.  Used diuretics as needed.       VTE Risk Mitigation (From admission, onward)         Ordered     enoxaparin injection 40 mg  Daily         11/19/21 5395     Place sequential compression device  Until discontinued         11/19/21 1549     IP VTE LOW RISK PATIENT  Once         11/19/21 1549                   Austin Valencia DO  Department of Hospital Medicine   [facility] Surgical Unit

## 2021-11-22 NOTE — PROGRESS NOTES
[facility] Surgical [facility] Medicine  Progress Note    Patient Name: Courtney Pickens  MRN: [id number]  Patient Class: IP- Swing   Admission Date: 11/19/2021  Length of Stay: 3 days  Attending Physician: Austin Valencia DO  Primary Care Provider: Primary Doctor No        Subjective:     Principal Problem:Pneumonia        HPI:  80 yr old Female admitted for swing-bed following an admit at [facility] with ICU stay since 11/15 for acute heart failure, hypoxemic resp failure, acute renal failure, hyperkalemia, hyponatremia, lactic acidosis, sepsis and anemia.  She continues to take PO augmentin for pneumonia.  Ms Pickens has collins past medical hx of HTN, hypothyroidism, hyperlipidemia, and restless leg syndrome.  She is admitted for medical management and rehab.        Overview/Hospital Course:  Pt found resting on bed, c/o SOB.  Noted scattered wheezing throughout and rhonchi to RLL.  Discussed with Dr. Pittman who agrees with plan to give neb tx.  Pt admitted for swingbed for medical management and rehab.      Interval History: 11/19/21 1859     Review of Systems   Constitutional: Positive for fatigue. Negative for appetite change and fever.   Respiratory: Positive for cough and wheezing.    Cardiovascular: Negative for chest pain.   Gastrointestinal: Negative for abdominal pain, diarrhea, nausea and vomiting.   Musculoskeletal: Negative for arthralgias.   Skin: Negative for color change.   Neurological: Negative for headaches.   Psychiatric/Behavioral: Negative for agitation.     Objective:     Vital Signs (Most Recent):  Temp: 98 °F (36.7 °C) (11/22/21 0702)  Pulse: 76 (11/22/21 1357)  Resp: 18 (11/22/21 1357)  BP: (!) 144/55 (11/22/21 0702)  SpO2: 99 % (11/22/21 1357) Vital Signs (24h Range):  Temp:  [98 °F (36.7 °C)-98.7 °F (37.1 °C)] 98 °F (36.7 °C)  Pulse:  [55-78] 76  Resp:  [18-24] 18  SpO2:  [94 %-99 %] 99 %  BP: (140-149)/(52-60) 144/55     Weight: 74.8 kg (164 lb 14.4 oz)  Body mass index  is 30.16 kg/m².    Intake/Output Summary (Last 24 hours) at 11/22/2021 1405  Last data filed at 11/22/2021 1343  Gross per 24 hour   Intake 1200 ml   Output 4 ml   Net 1196 ml      Physical Exam  Constitutional:       General: She is not in acute distress.     Appearance: She is normal weight. She is ill-appearing. She is not toxic-appearing.   HENT:      Mouth/Throat:      Mouth: Mucous membranes are moist.   Cardiovascular:      Rate and Rhythm: Normal rate and regular rhythm.   Pulmonary:      Breath sounds: Wheezing present.   Abdominal:      Palpations: Abdomen is soft.   Skin:     General: Skin is warm and dry.   Neurological:      Mental Status: She is alert and oriented to person, place, and time.         Significant Labs:labs sent from [facility] review   Significant Imaging: imaging sent from [facility] review.        Assessment/Plan:      * Pneumonia  Continue PO per recs from previous hospital.       Essential hypertension    Continue home meds.  Monitor vitals.    Chronic congestive heart failure  Daily weights.  Used diuretics as needed.         VTE Risk Mitigation (From admission, onward)         Ordered     enoxaparin injection 40 mg  Daily         11/19/21 1855     Place sequential compression device  Until discontinued         11/19/21 1549     IP VTE LOW RISK PATIENT  Once         11/19/21 1549                Discharge Planning   DOREEN:      Code Status: Full Code   Is the patient medically ready for discharge?:     Reason for patient still in hospital (select all that apply): Swingbed  Discharge Plan A: Home                  VIOLA Snell  Department of Hospital Medicine   [facility] Surgical [facility]

## 2021-11-22 NOTE — PROGRESS NOTES
Clinical Pharmacy Chart Review Note      Admit Date: 11/19/2021   LOS: 3 days       Courtney Pickens is a 81 y.o. female admitted to SNF for PT/OT after hospitalization for pneumonia    Active Hospital Problems    Diagnosis  POA    *Pneumonia [J18.9]  Yes     Continue augmentin   Add neb pulmicort, duo-neb and prn albuterol      Chronic congestive heart failure [I50.9]  Yes     Monitor lab  Monitor weight  Cardiac diet  Continue home medication        Essential hypertension [I10]  Yes     Chronic     Continue home medication  Monitor VS      At high risk for venous thromboembolism (VTE) [Z91.89]  Yes     Lovenox        Resolved Hospital Problems   No resolved problems to display.     Review of patient's allergies indicates:   Allergen Reactions    Codeine     Lisinopril     Opioids - morphine analogues      Patient Active Problem List    Diagnosis Date Noted    Chronic congestive heart failure 11/19/2021    Pneumonia 11/19/2021    Essential hypertension 11/19/2021    At high risk for venous thromboembolism (VTE) 11/19/2021       Scheduled Meds:    albuterol-ipratropium  3 mL Nebulization Q6H WAKE    amLODIPine  10 mg Oral Daily    budesonide  0.5 mg Nebulization Q12H    enoxaparin  40 mg Subcutaneous Daily    estradioL  1 mg Oral Daily    hydrALAZINE  25 mg Oral TID    oxybutynin  10 mg Oral Daily    pantoprazole  40 mg Oral Daily    rOPINIRole  2 mg Oral TID    thyroid (pork)  90 mg Oral Before breakfast    venlafaxine  150 mg Oral Daily     Continuous Infusions:   PRN Meds: acetaminophen, albuterol sulfate, calcium carbonate, melatonin, ondansetron, senna-docusate 8.6-50 mg    OBJECTIVE:     Vital Signs (Last 24H)  Temp:  [98 °F (36.7 °C)-98.7 °F (37.1 °C)]   Pulse:  [56-78]   Resp:  [18-24]   BP: (140-149)/(52-60)   SpO2:  [94 %-99 %]     Laboratory:  CBC:   Recent Labs   Lab 11/20/21  0409   WBC 5.95   RBC 3.39*   HGB 10.7*   HCT 32.1*      MCV 94.7   MCH 31.6*   MCHC 33.3     BMP:    Recent Labs   Lab 11/20/21  0409      *   K 4.0      CO2 23   BUN 15   CREATININE 1.19*   CALCIUM 8.2*         ASSESSMENT/PLAN:     ECrCl=35.7.  Weekly swingbed medication regimen review complete.  Will request TSH.  Will continue to monitor.    Sarthak Paris, Pharm. D.  Director of Pharmacy  [facility]  [phone]  [email or web address]

## 2021-11-22 NOTE — PLAN OF CARE
Problem: Physical Therapy Goal  Goal: Physical Therapy Goal  Description:   Goals     Short Term Goals  2 weeks    1) I HOME EXERCISE PROGRAM  2) I in room transfers  3) Gait 500 feet with no device  and No assistance  4) I Wheel Chair skills    Long Term Goals  4 weeks    1) Discharge to home  2) tolerate 45 mins of activity  3) up/down 10 steps I with rail     Outcome: Ongoing, Progressing

## 2021-11-22 NOTE — SUBJECTIVE & OBJECTIVE
Interval History: 11/19/21 1859     Review of Systems   Constitutional: Positive for fatigue. Negative for appetite change and fever.   Respiratory: Positive for cough and wheezing.    Cardiovascular: Negative for chest pain.   Gastrointestinal: Negative for abdominal pain, diarrhea, nausea and vomiting.   Musculoskeletal: Negative for arthralgias.   Skin: Negative for color change.   Neurological: Negative for headaches.   Psychiatric/Behavioral: Negative for agitation.     Objective:     Vital Signs (Most Recent):  Temp: 98 °F (36.7 °C) (11/22/21 0702)  Pulse: 76 (11/22/21 1357)  Resp: 18 (11/22/21 1357)  BP: (!) 144/55 (11/22/21 0702)  SpO2: 99 % (11/22/21 1357) Vital Signs (24h Range):  Temp:  [98 °F (36.7 °C)-98.7 °F (37.1 °C)] 98 °F (36.7 °C)  Pulse:  [55-78] 76  Resp:  [18-24] 18  SpO2:  [94 %-99 %] 99 %  BP: (140-149)/(52-60) 144/55     Weight: 74.8 kg (164 lb 14.4 oz)  Body mass index is 30.16 kg/m².    Intake/Output Summary (Last 24 hours) at 11/22/2021 1405  Last data filed at 11/22/2021 1343  Gross per 24 hour   Intake 1200 ml   Output 4 ml   Net 1196 ml      Physical Exam  Constitutional:       General: She is not in acute distress.     Appearance: She is normal weight. She is ill-appearing. She is not toxic-appearing.   HENT:      Mouth/Throat:      Mouth: Mucous membranes are moist.   Cardiovascular:      Rate and Rhythm: Normal rate and regular rhythm.   Pulmonary:      Breath sounds: Wheezing present.   Abdominal:      Palpations: Abdomen is soft.   Skin:     General: Skin is warm and dry.   Neurological:      Mental Status: She is alert and oriented to person, place, and time.         Significant Labs:labs sent from [facility] review   Significant Imaging: imaging sent from [facility].

## 2021-11-23 PROCEDURE — 27000221 HC OXYGEN, UP TO 24 HOURS

## 2021-11-23 PROCEDURE — 97110 THERAPEUTIC EXERCISES: CPT

## 2021-11-23 PROCEDURE — 11000004 HC SNF PRIVATE

## 2021-11-23 PROCEDURE — 94640 AIRWAY INHALATION TREATMENT: CPT

## 2021-11-23 PROCEDURE — 63600175 PHARM REV CODE 636 W HCPCS: Performed by: NURSE PRACTITIONER

## 2021-11-23 PROCEDURE — 97530 THERAPEUTIC ACTIVITIES: CPT

## 2021-11-23 PROCEDURE — 25000003 PHARM REV CODE 250: Performed by: NURSE PRACTITIONER

## 2021-11-23 PROCEDURE — 25000003 PHARM REV CODE 250: Performed by: HOSPITALIST

## 2021-11-23 PROCEDURE — 99900035 HC TECH TIME PER 15 MIN (STAT)

## 2021-11-23 PROCEDURE — 27000958

## 2021-11-23 PROCEDURE — 94761 N-INVAS EAR/PLS OXIMETRY MLT: CPT

## 2021-11-23 PROCEDURE — 25000242 PHARM REV CODE 250 ALT 637 W/ HCPCS: Performed by: NURSE PRACTITIONER

## 2021-11-23 RX ADMIN — BUDESONIDE 0.5 MG: 0.5 INHALANT ORAL at 08:11

## 2021-11-23 RX ADMIN — IPRATROPIUM BROMIDE AND ALBUTEROL SULFATE 3 ML: .5; 3 SOLUTION RESPIRATORY (INHALATION) at 08:11

## 2021-11-23 RX ADMIN — FUROSEMIDE 40 MG: 40 TABLET ORAL at 08:11

## 2021-11-23 RX ADMIN — IPRATROPIUM BROMIDE AND ALBUTEROL SULFATE 3 ML: .5; 3 SOLUTION RESPIRATORY (INHALATION) at 07:11

## 2021-11-23 RX ADMIN — THYROID 90 MG: 90 TABLET ORAL at 06:11

## 2021-11-23 RX ADMIN — ESTRADIOL 1 MG: 0.5 TABLET ORAL at 08:11

## 2021-11-23 RX ADMIN — HYDRALAZINE HYDROCHLORIDE 25 MG: 25 TABLET ORAL at 03:11

## 2021-11-23 RX ADMIN — VENLAFAXINE HYDROCHLORIDE 150 MG: 75 CAPSULE, EXTENDED RELEASE ORAL at 08:11

## 2021-11-23 RX ADMIN — PANTOPRAZOLE SODIUM 40 MG: 40 TABLET, DELAYED RELEASE ORAL at 08:11

## 2021-11-23 RX ADMIN — AMLODIPINE BESYLATE 10 MG: 10 TABLET ORAL at 08:11

## 2021-11-23 RX ADMIN — IPRATROPIUM BROMIDE AND ALBUTEROL SULFATE 3 ML: .5; 3 SOLUTION RESPIRATORY (INHALATION) at 02:11

## 2021-11-23 RX ADMIN — MELATONIN 6 MG: 3 TAB ORAL at 08:11

## 2021-11-23 RX ADMIN — OXYBUTYNIN CHLORIDE 10 MG: 5 TABLET, EXTENDED RELEASE ORAL at 08:11

## 2021-11-23 RX ADMIN — ACETAMINOPHEN 650 MG: 325 TABLET ORAL at 10:11

## 2021-11-23 RX ADMIN — BUDESONIDE 0.5 MG: 0.5 INHALANT ORAL at 07:11

## 2021-11-23 RX ADMIN — ROPINIROLE 2 MG: 1 TABLET, FILM COATED ORAL at 08:11

## 2021-11-23 RX ADMIN — ENOXAPARIN SODIUM 40 MG: 40 INJECTION SUBCUTANEOUS at 04:11

## 2021-11-23 RX ADMIN — HYDRALAZINE HYDROCHLORIDE 25 MG: 25 TABLET ORAL at 08:11

## 2021-11-23 RX ADMIN — ROPINIROLE 2 MG: 1 TABLET, FILM COATED ORAL at 03:11

## 2021-11-23 NOTE — PT/OT/SLP PROGRESS
Physical Therapy  Treatment    Courtney Pickens   MRN: [id number]   Admitting Diagnosis: Pneumonia    PT Received On: 11/22/21  PT Start Time: 1115     PT Stop Time: 1145    PT Total Time (min): 30 min       Billable Minutes:  Therapeutic Exercise 30    Treatment Type: Evaluation,Treatment  PT/PTA: PT     PTA Visit Number: 0       General Precautions: Standard, fall  Orthopedic Precautions: N/A     Subjective:    Patient agreeable to participate in therapy.         Objective:        Functional Mobility:  Bed Mobility:        Transfers:       Gait: Independent >200' pushing WC         Balance:   Static Sit: GOOD: Takes MODERATE challenges from all directions  Dynamic Sit: GOOD: Maintains balance through MODERATE excursions of active trunk movement  Static Stand: GOOD: Takes MODERATE challenges from all directions  Dynamic stand: GOOD: Needs SUPERVISION only during gait and able to self right with moderate LOB       Therapeutic Activities and Exercises:  Exercises 63970    Heelslide 5'   LAQ 3'   Hip ADD/ABD 2'   Bike 15'   Nustep    sit <>stand x4           Activities [id number]    Nustep    WC self propulsion    transfer training        Gait           Patient left up in chair with call button in reach.    Assessment:  Courtney Pickens is a 81 y.o. female with a medical diagnosis of Pneumonia and presents with mild weakness and decreased tolerance to activity.    Rehab identified problem list/impairments: Rehab identified problem list/impairments: weakness,impaired endurance,gait instability,impaired balance    Rehab potential is good.    Activity tolerance: Fair    Discharge recommendations: Discharge Facility/Level of Care Needs: home     Barriers to discharge:      Equipment recommendations: Equipment Needed After Discharge: none     GOALS:   Multidisciplinary Problems     Physical Therapy Goals        Problem: Physical Therapy Goal    Goal Priority Disciplines Outcome Goal Variances Interventions   Physical Therapy Goal      PT, PT/OT Ongoing, Progressing     Description:   Goals     Short Term Goals  2 weeks    1) I HOME EXERCISE PROGRAM  2) I in room transfers  3) Gait 500 feet with no device  and No assistance  4) I Wheel Chair skills    Long Term Goals  4 weeks    1) Discharge to home  2) tolerate 45 mins of activity  3) up/down 10 steps I with rail                      PLAN:    Patient to be seen 5 x/week  to address the above listed problems via    Plan of Care expires: 12/24/21  Plan of Care reviewed with: patient         11/23/2021

## 2021-11-23 NOTE — PLAN OF CARE
Problem: UTI (Urinary Tract Infection)  Goal: Improved Infection Symptoms  Outcome: Ongoing, Progressing

## 2021-11-23 NOTE — CONSULTS
[facility] Medical Surgical Unit  Adult Nutrition  Consult Note         Reason for Assessment  Reason For Assessment: consult swing bed  Nutrition Risk Screen: no indicators present  Malnutrition  Is Patient Malnourished: No  Skin Integrity  Marvel Risk Assessment  Sensory Perception: 4-->no impairment  Moisture: 3-->occasionally moist  Activity: 3-->walks occasionally  Mobility: 3-->slightly limited  Nutrition: 3-->adequate  Friction and Shear: 2-->potential problem  Marvel Score: 18  Comments on skin integrity: none remarkable  Nutrition Diagnosis  Overweight   related to Excessive Caloric intake as evidenced by BMI 29.9    Nutrition Diagnosis Status: New      Comments: overweight. Using diet to maintain weight while in swing bed  Nutrition Risk  Level of Risk/Frequency of Follow-up: moderate - high  Comments on nutrition risk: eating 50%. Recommend 60%   No results for input(s): GLU, POCGLU in the last 72 hours.  Comments on Glucose: none remarkable  Nutrition Prescription / Recommendations  Recommendation/Intervention: continue nutritional plan of care  Goals: P.O. intake %, weight maintenance  Nutrition Goal Status: new  Current Diet Order: Cardiac  Chewing or Swallowing Difficulty?: No Chewing or swallowing difficulty  Recommended Diet: Heart Healthy  Recommended Oral Supplement: No Oral Supplements  Is Nutrition Support Recommended: No  Is Education Recommended: No  Monitor and Evaluation  % current Intake: P.O. intake of 50 - 75 %  % intake to meet estimated needs: 50 - 75 % and 75 - 100 %  Food and Nutrient Intake: food and beverage intake  Food and Nutrient Adminstration: diet order  Anthropometric Measurements: weight change  Biochemical Data, Medical Tests and Procedures: electrolyte and renal panel,glucose/endocrine profile  Nutrition-Focused Physical Findings: overall appearance,skin  Energy Calories Required: meeting needs  Protein Required: meeting needs  Fluid Required: meeting  "needs  Current Medical Diagnosis and Past Medical History  Diagnosis: pulmonary disease,cardiac disease  Past Medical History:   Diagnosis Date    Anxiety     Ocasio's esophagus     Depression     GERD (gastroesophageal reflux disease)     Hypothyroidism      Nutrition/Diet History  Spiritual, Cultural Beliefs, Temple Practices, Values that Affect Care: no  Food Allergies: NKFA  Factors Affecting Nutritional Intake: None identified at this time  Lab/Procedures/Meds  No results for input(s): NA, K, BUN, CREATININE, GLU, POCGLU, CALCIUM, ALBUMIN, CL, ALT, AST, PHOS in the last 72 hours.  Last A1c: No results found for: HGBA1C  Lab Results   Component Value Date    RBC 3.39 (L) 11/20/2021    HGB 10.7 (L) 11/20/2021    HCT 32.1 (L) 11/20/2021    MCV 94.7 11/20/2021    MCH 31.6 (H) 11/20/2021    MCHC 33.3 11/20/2021     Pertinent Labs Reviewed: reviewed  Pertinent Medications Reviewed: reviewed  Anthropometrics  Temp: 97.9 °F (36.6 °C)  Height Method: Stated  Height: 5' 2" (157.5 cm)  Height (inches): 62 in  Weight Method: Bed Scale  Weight: 74.1 kg (163 lb 5.8 oz)  Weight (lb): 163.36 lb  Ideal Body Weight (IBW), Female: 110 lb  % Ideal Body Weight, Female (lb): 148.51 %  BMI (Calculated): 29.9  BMI Grade: 25 - 29.9 - overweight     Estimated/Assessed Needs  RMR ([place]-St. Jeor Equation): 1164.25 Activity Factor: 1.2 Injury Factor: 1     Temp: 97.9 °F (36.6 °C)Oral  Weight Used For Calorie Calculations: 74.1 kg (163 lb 5.8 oz)   Energy Need Method: [place]-St Jeor Energy Calorie Requirements (kcal): 1397  Weight Used For Protein Calculations: 74 kg (163 lb 2.3 oz)  Protein Requirements: 74-89  Estimated Fluid Requirement Method: RDA Method    RDA Method (mL): 1397     Nutrition by Nursing  Diet/Nutrition Received: regular  Intake (%): 50%  Diet/Feeding Assistance: none  Diet/Feeding Tolerance: fair  Last Bowel Movement: 11/22/21              Nutrition Follow-Up  RD Follow-up?: Yes  Assessment and Plan  No new " Assessment & Plan notes have been filed under this hospital service since the last note was generated.  Service: Nutrition

## 2021-11-23 NOTE — PT/OT/SLP PROGRESS
Occupational Therapy  Treatment    Courtney Pickens   MRN: [id number]   Admitting Diagnosis: Pneumonia    OT Date of Treatment: 11/23/21   OT Start Time: 1020  OT Stop Time: 1058  OT Total Time (min): 38 min    Billable Minutes:  Therapeutic Activity 23 and Therapeutic Exercise 15    OT/CRISTINA: OT          General Precautions: Standard, fall  Orthopedic Precautions: Full weight bearing    Subjective:  Communicated with pt prior to session.  Pt upset today, stating she is anxious and wanting to know when she can go home.       Objective:        Functional Mobility:  Bed Mobility:  Mod i       Transfers:  independent        Functional Ambulation: independent short distances, needs Rollator for >50 feet and gets fatigued and increased fall risk at >100 feet; needs rest break.  Could benefit from continued PT especially for improving ambulatory endurance to decrease fall risk upon d/c to home.    Activities of Daily Living:     Feeding adaptive equipment: independent, no AE     UE adaptive equipment: no AE needed, Independent     LE adaptive equipment: extra time, no AE needed    Bathing adaptive equipment: shower chair and extra time needed    Balance:   Static Sit: NORMAL: No deviations seen in posture held statically  Dynamic Sit: NORMAL: No deviations seen in posture held dynamically  Static Stand: GOOD: Takes MODERATE challenges from all directions  Dynamic stand: GOOD: Needs SUPERVISION only during gait and able to self right with moderate LOB    Therapeutic Activities and Exercises:  Pt completed UBE x 10 min level 2 resist today, red putty at tabletop with need of redirection to remain on task today.  OT applied biofreeze as pt was completing UBE since she c/o left shoulder pain today from old onset shoulder injury.  Then, OT assisted pt to walk back to her room with walker as AD in standing.  Pt needed to stop after about 100 feet, having increased fall risk due to low endurance with ambulation.    AM-PAC 6 CLICK ADL  "  How much help from another person does this patient currently need?   1 = Unable, Total/Dependent Assistance  2 = A lot, Maximum/Moderate Assistance  3 = A little, Minimum/Contact Guard/Supervision  4 = None, Modified [place]/Independent          AM-PAC Raw Score CMS "G-Code Modifier Level of Impairment Assistance   6 % Total / Unable   7 - 8 CM 80 - 100% Maximal Assist   9-13 CL 60 - 80% Moderate Assist   14 - 19 CK 40 - 60% Moderate Assist   20 - 22 CJ 20 - 40% Minimal Assist   23 CI 1-20% SBA / CGA   24 CH 0% Independent/ Mod I       Patient left up in chair with nursing notified    ASSESSMENT:  Courtney Pickens is a 81 y.o. female with a medical diagnosis of Pneumonia and presents with deficits in endurance overall.    Rehab identified problem list/impairments: Rehab identified problem list/impairments: impaired endurance,weakness,impaired self care skills    Rehab potential is excellent.    Activity tolerance: Good    Discharge recommendations: Discharge Facility/Level of Care Needs: home with home health     Barriers to discharge: Barriers to Discharge: None    Equipment recommendations: rollator     GOALS:   Multidisciplinary Problems     Occupational Therapy Goals        Problem: Occupational Therapy Goal    Goal Priority Disciplines Outcome Interventions   Occupational Therapy Goal     OT, PT/OT Ongoing, Progressing    Description: Goals to be met by: d/c     Patient will increase functional independence with ADLs by performing:    LE Dressing with [place].  Increased functional strength to WFL for all ADL tasks.  Upper extremity exercise program x30 reps per handout, with independence completing chair seated exercises.                     Plan:  Patient to be seen 5 x/week to address the above listed problems via self-care/home management,community/work re-entry,therapeutic activities,therapeutic exercises,therapeutic groups  Plan of Care expires: 12/03/21  Plan of Care reviewed with: " patient         11/23/2021

## 2021-11-24 PROBLEM — E87.6 HYPOKALEMIA: Status: ACTIVE | Noted: 2021-11-24

## 2021-11-24 LAB
ANION GAP SERPL CALCULATED.3IONS-SCNC: 14 MMOL/L (ref 7–16)
BUN SERPL-MCNC: 6 MG/DL (ref 7–18)
BUN/CREAT SERPL: 6 (ref 6–20)
CALCIUM SERPL-MCNC: 8.5 MG/DL (ref 8.5–10.1)
CHLORIDE SERPL-SCNC: 99 MMOL/L (ref 98–107)
CO2 SERPL-SCNC: 23 MMOL/L (ref 21–32)
CREAT SERPL-MCNC: 1.08 MG/DL (ref 0.55–1.02)
GLUCOSE SERPL-MCNC: 93 MG/DL (ref 74–106)
POTASSIUM SERPL-SCNC: 3.1 MMOL/L (ref 3.5–5.1)
SODIUM SERPL-SCNC: 133 MMOL/L (ref 136–145)

## 2021-11-24 PROCEDURE — 25000003 PHARM REV CODE 250: Performed by: NURSE PRACTITIONER

## 2021-11-24 PROCEDURE — 99900035 HC TECH TIME PER 15 MIN (STAT)

## 2021-11-24 PROCEDURE — 94761 N-INVAS EAR/PLS OXIMETRY MLT: CPT

## 2021-11-24 PROCEDURE — 27000958

## 2021-11-24 PROCEDURE — 80048 BASIC METABOLIC PNL TOTAL CA: CPT | Performed by: HOSPITALIST

## 2021-11-24 PROCEDURE — 11000004 HC SNF PRIVATE

## 2021-11-24 PROCEDURE — 27000221 HC OXYGEN, UP TO 24 HOURS

## 2021-11-24 PROCEDURE — 25000242 PHARM REV CODE 250 ALT 637 W/ HCPCS: Performed by: NURSE PRACTITIONER

## 2021-11-24 PROCEDURE — 25000003 PHARM REV CODE 250: Performed by: HOSPITALIST

## 2021-11-24 PROCEDURE — 99308 PR NURSING FAC CARE, SUBSEQ, MINOR COMPLIC: ICD-10-PCS | Mod: ,,, | Performed by: HOSPITALIST

## 2021-11-24 PROCEDURE — 94640 AIRWAY INHALATION TREATMENT: CPT

## 2021-11-24 PROCEDURE — 99308 SBSQ NF CARE LOW MDM 20: CPT | Mod: ,,, | Performed by: HOSPITALIST

## 2021-11-24 PROCEDURE — 63600175 PHARM REV CODE 636 W HCPCS: Performed by: NURSE PRACTITIONER

## 2021-11-24 PROCEDURE — 36415 COLL VENOUS BLD VENIPUNCTURE: CPT | Performed by: HOSPITALIST

## 2021-11-24 RX ORDER — POTASSIUM CHLORIDE 20 MEQ/1
40 TABLET, EXTENDED RELEASE ORAL ONCE
Status: COMPLETED | OUTPATIENT
Start: 2021-11-24 | End: 2021-11-24

## 2021-11-24 RX ORDER — POTASSIUM CHLORIDE 20 MEQ/1
20 TABLET, EXTENDED RELEASE ORAL DAILY
Status: DISCONTINUED | OUTPATIENT
Start: 2021-11-25 | End: 2021-11-25

## 2021-11-24 RX ADMIN — THYROID 90 MG: 90 TABLET ORAL at 05:11

## 2021-11-24 RX ADMIN — ENOXAPARIN SODIUM 40 MG: 40 INJECTION SUBCUTANEOUS at 06:11

## 2021-11-24 RX ADMIN — BUDESONIDE 0.5 MG: 0.5 INHALANT ORAL at 08:11

## 2021-11-24 RX ADMIN — ROPINIROLE 2 MG: 1 TABLET, FILM COATED ORAL at 08:11

## 2021-11-24 RX ADMIN — AMLODIPINE BESYLATE 10 MG: 10 TABLET ORAL at 08:11

## 2021-11-24 RX ADMIN — BUDESONIDE 0.5 MG: 0.5 INHALANT ORAL at 07:11

## 2021-11-24 RX ADMIN — POTASSIUM CHLORIDE 40 MEQ: 20 TABLET, EXTENDED RELEASE ORAL at 06:11

## 2021-11-24 RX ADMIN — MELATONIN 6 MG: 3 TAB ORAL at 08:11

## 2021-11-24 RX ADMIN — VENLAFAXINE HYDROCHLORIDE 150 MG: 75 CAPSULE, EXTENDED RELEASE ORAL at 08:11

## 2021-11-24 RX ADMIN — HYDRALAZINE HYDROCHLORIDE 25 MG: 25 TABLET ORAL at 08:11

## 2021-11-24 RX ADMIN — IPRATROPIUM BROMIDE AND ALBUTEROL SULFATE 3 ML: .5; 3 SOLUTION RESPIRATORY (INHALATION) at 08:11

## 2021-11-24 RX ADMIN — OXYBUTYNIN CHLORIDE 10 MG: 5 TABLET, EXTENDED RELEASE ORAL at 08:11

## 2021-11-24 RX ADMIN — PANTOPRAZOLE SODIUM 40 MG: 40 TABLET, DELAYED RELEASE ORAL at 08:11

## 2021-11-24 RX ADMIN — ACETAMINOPHEN 650 MG: 325 TABLET ORAL at 06:11

## 2021-11-24 RX ADMIN — FUROSEMIDE 40 MG: 40 TABLET ORAL at 08:11

## 2021-11-24 RX ADMIN — IPRATROPIUM BROMIDE AND ALBUTEROL SULFATE 3 ML: .5; 3 SOLUTION RESPIRATORY (INHALATION) at 07:11

## 2021-11-24 RX ADMIN — ESTRADIOL 1 MG: 0.5 TABLET ORAL at 08:11

## 2021-11-24 NOTE — PLAN OF CARE
Problem: Fall Injury Risk  Goal: Absence of Fall and Fall-Related Injury  11/24/2021 1058 by Salima Dia, ARDEN  Outcome: Ongoing, Progressing  11/24/2021 1058 by Salima Dia, RN  Outcome: Ongoing, Progressing

## 2021-11-24 NOTE — SUBJECTIVE & OBJECTIVE
Interval History: No major issues, doing well, no SOB    Review of Systems   Respiratory: Negative for shortness of breath.    Cardiovascular: Negative for chest pain.   Gastrointestinal: Negative for abdominal pain, nausea and vomiting.   Psychiatric/Behavioral: Negative for agitation and confusion.   All other systems reviewed and are negative.    Objective:     Vital Signs (Most Recent):  Temp: 98.4 °F (36.9 °C) (11/24/21 0702)  Pulse: 77 (11/24/21 0833)  Resp: 18 (11/24/21 0833)  BP: (!) 148/57 (11/24/21 0702)  SpO2: 100 % (11/24/21 0833) Vital Signs (24h Range):  Temp:  [97.8 °F (36.6 °C)-98.4 °F (36.9 °C)] 98.4 °F (36.9 °C)  Pulse:  [63-82] 77  Resp:  [18-20] 18  SpO2:  [93 %-100 %] 100 %  BP: (134-148)/(57-76) 148/57     Weight: 73.2 kg (161 lb 6 oz)  Body mass index is 29.52 kg/m².    Intake/Output Summary (Last 24 hours) at 11/24/2021 1328  Last data filed at 11/24/2021 0539  Gross per 24 hour   Intake 1050 ml   Output 500 ml   Net 550 ml      Physical Exam  Vitals and nursing note reviewed.   Constitutional:       General: She is awake. She is not in acute distress.     Appearance: Normal appearance. She is overweight.   HENT:      Head: Normocephalic and atraumatic.      Mouth/Throat:      Mouth: Mucous membranes are moist.   Eyes:      General: No scleral icterus.     Extraocular Movements: Extraocular movements intact.      Conjunctiva/sclera: Conjunctivae normal.      Pupils: Pupils are equal, round, and reactive to light.   Cardiovascular:      Rate and Rhythm: Normal rate and regular rhythm.      Heart sounds: No murmur heard.  No friction rub. No gallop.    Pulmonary:      Effort: Pulmonary effort is normal. No respiratory distress.      Breath sounds: No rales.      Comments:    Abdominal:      General: Abdomen is flat. Bowel sounds are normal. There is no distension.      Palpations: Abdomen is soft.      Tenderness: There is no abdominal tenderness. There is no guarding.   Musculoskeletal:          General: No swelling or tenderness.      Cervical back: Neck supple.   Skin:     General: Skin is warm and dry.      Capillary Refill: Capillary refill takes less than 2 seconds.      Coloration: Skin is not jaundiced.      Findings: No rash.   Neurological:      General: No focal deficit present.      Mental Status: She is alert and oriented to person, place, and time.      Sensory: No sensory deficit.      Motor: No weakness.   Psychiatric:         Mood and Affect: Mood normal.         Thought Content: Thought content normal.         Judgment: Judgment normal.         Significant Labs:   All pertinent labs within the past 24 hours have been reviewed.  Recent Lab Results       11/24/21  0633        Anion Gap 14       BUN 6       BUN/CREAT RATIO 6       Calcium 8.5       Chloride 99       CO2 23       Creatinine 1.08       eGFR if non African American 52       Glucose 93       Potassium 3.1       Sodium 133             Significant Imaging: I have reviewed all pertinent imaging results/findings within the past 24 hours.

## 2021-11-24 NOTE — PROGRESS NOTES
[facility] Medicine  Progress Note    Patient Name: Courtney Pickens  MRN: [id number]  Patient Class: IP- Swing   Admission Date: 11/19/2021  Length of Stay: 5 days  Attending Physician: Austin Valencia DO  Primary Care Provider: Primary Doctor No        Subjective:     Principal Problem:Pneumonia    [facility] Admission Certification    I certify that skilled nursing facility services are required to be given on an inpatient basis due to the following required skilled nursing and/or skilled rehabilitation services on a continuing basis:    management & evaluation of a patient plan of care that requires skilled nursing or rehabilitation services    I estimate that the additional period of SNF inpatient care will be 7-14 days.    Plans for post SNF care are: Home Care  ______________________________________________________________________        HPI:  80 yr old Female admitted for swing-bed following an admit at [facility] with ICU stay since 11/15 for acute heart failure, hypoxemic resp failure, acute renal failure, hyperkalemia, hyponatremia, lactic acidosis, sepsis and anemia.  She continues to take PO augmentin for pneumonia.  Ms Pickens has collins past medical hx of HTN, hypothyroidism, hyperlipidemia, and restless leg syndrome.  She is admitted for medical management and rehab.        Overview/Hospital Course:  11/24 Feels groggy this am but no SOB.  Wants to go on pass.  No major issues.       Interval History: No major issues, doing well, no SOB    Review of Systems   Respiratory: Negative for shortness of breath.    Cardiovascular: Negative for chest pain.   Gastrointestinal: Negative for abdominal pain, nausea and vomiting.   Psychiatric/Behavioral: Negative for agitation and confusion.   All other systems reviewed and are negative.    Objective:     Vital Signs (Most Recent):  Temp: 98.4 °F (36.9 °C) (11/24/21 0702)  Pulse: 77  (11/24/21 0833)  Resp: 18 (11/24/21 0833)  BP: (!) 148/57 (11/24/21 0702)  SpO2: 100 % (11/24/21 0833) Vital Signs (24h Range):  Temp:  [97.8 °F (36.6 °C)-98.4 °F (36.9 °C)] 98.4 °F (36.9 °C)  Pulse:  [63-82] 77  Resp:  [18-20] 18  SpO2:  [93 %-100 %] 100 %  BP: (134-148)/(57-76) 148/57     Weight: 73.2 kg (161 lb 6 oz)  Body mass index is 29.52 kg/m².    Intake/Output Summary (Last 24 hours) at 11/24/2021 1328  Last data filed at 11/24/2021 0539  Gross per 24 hour   Intake 1050 ml   Output 500 ml   Net 550 ml      Physical Exam  Vitals and nursing note reviewed.   Constitutional:       General: She is awake. She is not in acute distress.     Appearance: Normal appearance. She is overweight.   HENT:      Head: Normocephalic and atraumatic.      Mouth/Throat:      Mouth: Mucous membranes are moist.   Eyes:      General: No scleral icterus.     Extraocular Movements: Extraocular movements intact.      Conjunctiva/sclera: Conjunctivae normal.      Pupils: Pupils are equal, round, and reactive to light.   Cardiovascular:      Rate and Rhythm: Normal rate and regular rhythm.      Heart sounds: No murmur heard.  No friction rub. No gallop.    Pulmonary:      Effort: Pulmonary effort is normal. No respiratory distress.      Breath sounds: No rales.      Comments:    Abdominal:      General: Abdomen is flat. Bowel sounds are normal. There is no distension.      Palpations: Abdomen is soft.      Tenderness: There is no abdominal tenderness. There is no guarding.   Musculoskeletal:         General: No swelling or tenderness.      Cervical back: Neck supple.   Skin:     General: Skin is warm and dry.      Capillary Refill: Capillary refill takes less than 2 seconds.      Coloration: Skin is not jaundiced.      Findings: No rash.   Neurological:      General: No focal deficit present.      Mental Status: She is alert and oriented to person, place, and time.      Sensory: No sensory deficit.      Motor: No weakness.    Psychiatric:         Mood and Affect: Mood normal.         Thought Content: Thought content normal.         Judgment: Judgment normal.         Significant Labs:   All pertinent labs within the past 24 hours have been reviewed.  Recent Lab Results       11/24/21  0633        Anion Gap 14       BUN 6       BUN/CREAT RATIO 6       Calcium 8.5       Chloride 99       CO2 23       Creatinine 1.08       eGFR if non African American 52       Glucose 93       Potassium 3.1       Sodium 133             Significant Imaging: I have reviewed all pertinent imaging results/findings within the past 24 hours.      Assessment/Plan:      * Pneumonia  Continue PO per recs from previous hospital.       Hypokalemia  Monitor and replace as needed.  On daily.       Essential hypertension    Continue home meds.  Monitor vitals.    Chronic congestive heart failure  Daily weights.  Used diuretics as needed.         VTE Risk Mitigation (From admission, onward)         Ordered     enoxaparin injection 40 mg  Daily         11/19/21 1855     IP VTE LOW RISK PATIENT  Once         11/19/21 1549                Discharge Planning   DOREEN:      Code Status: Full Code   Is the patient medically ready for discharge?:     Reason for patient still in hospital (select all that apply): Patient trending condition, Laboratory test and PT / OT recommendations  Discharge Plan A: Home                  Austin Valencia DO  Department of Hospital Medicine   [facility]

## 2021-11-25 PROCEDURE — 99900035 HC TECH TIME PER 15 MIN (STAT)

## 2021-11-25 PROCEDURE — 25000003 PHARM REV CODE 250: Performed by: HOSPITALIST

## 2021-11-25 PROCEDURE — 25000003 PHARM REV CODE 250: Performed by: NURSE PRACTITIONER

## 2021-11-25 PROCEDURE — 94640 AIRWAY INHALATION TREATMENT: CPT

## 2021-11-25 PROCEDURE — 97110 THERAPEUTIC EXERCISES: CPT | Mod: CO

## 2021-11-25 PROCEDURE — 25000242 PHARM REV CODE 250 ALT 637 W/ HCPCS: Performed by: NURSE PRACTITIONER

## 2021-11-25 PROCEDURE — 63600175 PHARM REV CODE 636 W HCPCS: Performed by: NURSE PRACTITIONER

## 2021-11-25 PROCEDURE — 27000958

## 2021-11-25 PROCEDURE — 11000004 HC SNF PRIVATE

## 2021-11-25 PROCEDURE — 27000221 HC OXYGEN, UP TO 24 HOURS

## 2021-11-25 PROCEDURE — 94761 N-INVAS EAR/PLS OXIMETRY MLT: CPT

## 2021-11-25 RX ORDER — DIPHENHYDRAMINE HCL 25 MG
25 TABLET ORAL EVERY 6 HOURS PRN
Status: DISCONTINUED | OUTPATIENT
Start: 2021-11-25 | End: 2021-12-06 | Stop reason: HOSPADM

## 2021-11-25 RX ADMIN — POTASSIUM CHLORIDE 20 MEQ: 20 TABLET, EXTENDED RELEASE ORAL at 08:11

## 2021-11-25 RX ADMIN — ZINC OXIDE TOPICAL OINT.: at 08:11

## 2021-11-25 RX ADMIN — THYROID 90 MG: 90 TABLET ORAL at 05:11

## 2021-11-25 RX ADMIN — IPRATROPIUM BROMIDE AND ALBUTEROL SULFATE 3 ML: .5; 3 SOLUTION RESPIRATORY (INHALATION) at 02:11

## 2021-11-25 RX ADMIN — IPRATROPIUM BROMIDE AND ALBUTEROL SULFATE 3 ML: .5; 3 SOLUTION RESPIRATORY (INHALATION) at 07:11

## 2021-11-25 RX ADMIN — VENLAFAXINE HYDROCHLORIDE 150 MG: 75 CAPSULE, EXTENDED RELEASE ORAL at 08:11

## 2021-11-25 RX ADMIN — ROPINIROLE 2 MG: 1 TABLET, FILM COATED ORAL at 04:11

## 2021-11-25 RX ADMIN — DIPHENHYDRAMINE HYDROCHLORIDE 25 MG: 25 TABLET ORAL at 06:11

## 2021-11-25 RX ADMIN — ROPINIROLE 2 MG: 1 TABLET, FILM COATED ORAL at 08:11

## 2021-11-25 RX ADMIN — ESTRADIOL 1 MG: 0.5 TABLET ORAL at 08:11

## 2021-11-25 RX ADMIN — HYDRALAZINE HYDROCHLORIDE 25 MG: 25 TABLET ORAL at 08:11

## 2021-11-25 RX ADMIN — PANTOPRAZOLE SODIUM 40 MG: 40 TABLET, DELAYED RELEASE ORAL at 08:11

## 2021-11-25 RX ADMIN — MELATONIN 6 MG: 3 TAB ORAL at 08:11

## 2021-11-25 RX ADMIN — BUDESONIDE 0.5 MG: 0.5 INHALANT ORAL at 07:11

## 2021-11-25 RX ADMIN — HYDRALAZINE HYDROCHLORIDE 25 MG: 25 TABLET ORAL at 04:11

## 2021-11-25 RX ADMIN — ENOXAPARIN SODIUM 40 MG: 40 INJECTION SUBCUTANEOUS at 04:11

## 2021-11-25 RX ADMIN — OXYBUTYNIN CHLORIDE 10 MG: 5 TABLET, EXTENDED RELEASE ORAL at 08:11

## 2021-11-25 RX ADMIN — FUROSEMIDE 40 MG: 40 TABLET ORAL at 08:11

## 2021-11-25 RX ADMIN — AMLODIPINE BESYLATE 10 MG: 10 TABLET ORAL at 08:11

## 2021-11-25 RX ADMIN — ACETAMINOPHEN 650 MG: 325 TABLET ORAL at 08:11

## 2021-11-25 RX ADMIN — ACETAMINOPHEN 650 MG: 325 TABLET ORAL at 02:11

## 2021-11-25 NOTE — NURSING
Patient, called out to notify me of a rash all over back and arms. Patient daughter also informed me that the swelling in pt lower extremities has worsened after receiving lasix 40 mg daily for two days. Attempted to notify ER practitioner at 19:00 awaiting response

## 2021-11-25 NOTE — PROGRESS NOTES
Called to floor to evaluate pruritic rash. Patient states it has been present for about a week. Appears to be allergic reaction but she denies any new exposures. She just finished abx which could be the problem. Will give Benadryl and ointment. Pt family concerned about edema to lower extremities. She is on Lasix 40 mg currently. Advised staff to elevate extremities.

## 2021-11-25 NOTE — PT/OT/SLP PROGRESS
"Occupational Therapy  Treatment    Courtney Pickens   MRN: [id number]   Admitting Diagnosis: Pneumonia    OT Date of Treatment: 11/25/21   OT Start Time: 0830  OT Stop Time: 0900  OT Total Time (min): 30 min    Billable Minutes:  Therapeutic Exercise 30               General Precautions: Standard, fall  Orthopedic Precautions:    Braces:    Respiratory Status:   ·           Subjective:  Communicated with nurse prior to session. Pt agreeable to short OT session d/t reported fatigue.      Pain/Comfort  Pain Rating 1: 0/10    Objective:        Functional Mobility:  Bed Mobility:       Transfers:        Functional Ambulation:     Activities of Daily Living:  Not assessed    Balance:   Static Sit: GOOD+: Takes MAXIMAL challenges from all directions.    Dynamic Sit: GOOD+: Maintains balance through MAXIMAL excursions of active trunk motion  Static Stand: GOOD: Takes MODERATE challenges from all directions  Dynamic stand: GOOD: Needs SUPERVISION only during gait and able to self right with moderate LOB    Therapeutic Activities and Exercises:  Pt completed various resistive progressive there ex to challenge pts act tolerance, ROM, weight shifting, dynamic sitting balance, and endurance for improved perf and safety during ADLs. Pt tolerated well AEB requiring min rbs t/o.      AM-PAC 6 CLICK ADL   How much help from another person does this patient currently need?   1 = Unable, Total/Dependent Assistance  2 = A lot, Maximum/Moderate Assistance  3 = A little, Minimum/Contact Guard/Supervision  4 = None, Modified [place]/Independent          AM-PAC Raw Score CMS "G-Code Modifier Level of Impairment Assistance   6 % Total / Unable   7 - 8 CM 80 - 100% Maximal Assist   9-13 CL 60 - 80% Moderate Assist   14 - 19 CK 40 - 60% Moderate Assist   20 - 22 CJ 20 - 40% Minimal Assist   23 CI 1-20% SBA / CGA   24 CH 0% Independent/ Mod I       Patient left up in chair with call button in reach    ASSESSMENT:  Courtney Pickens is " a 81 y.o. female with a medical diagnosis of Pneumonia and presents with no new c/o.    Rehab identified problem list/impairments:      Rehab potential is excellent.    Activity tolerance: Good    Discharge recommendations:       Barriers to discharge:      Equipment recommendations:       GOALS:   Multidisciplinary Problems     Occupational Therapy Goals        Problem: Occupational Therapy Goal    Goal Priority Disciplines Outcome Interventions   Occupational Therapy Goal     OT, PT/OT Ongoing, Progressing    Description: Goals to be met by: d/c     Patient will increase functional independence with ADLs by performing:    LE Dressing with [place].  Increased functional strength to WFL for all ADL tasks.  Upper extremity exercise program x30 reps per handout, with independence completing chair seated exercises.                     Plan:  Patient to be seen 5 x/week to address the above listed problems via self-care/home management,community/work re-entry,therapeutic activities,therapeutic exercises,therapeutic groups  Plan of Care expires: 12/03/21  Plan of Care reviewed with: patient         11/25/2021

## 2021-11-26 LAB
ANION GAP SERPL CALCULATED.3IONS-SCNC: 16 MMOL/L (ref 7–16)
BASOPHILS # BLD AUTO: 0.01 K/UL (ref 0–0.2)
BASOPHILS NFR BLD AUTO: 0.2 % (ref 0–1)
BUN SERPL-MCNC: 8 MG/DL (ref 7–18)
BUN/CREAT SERPL: 8 (ref 6–20)
CALCIUM SERPL-MCNC: 8.2 MG/DL (ref 8.5–10.1)
CHLORIDE SERPL-SCNC: 95 MMOL/L (ref 98–107)
CO2 SERPL-SCNC: 20 MMOL/L (ref 21–32)
CREAT SERPL-MCNC: 0.95 MG/DL (ref 0.55–1.02)
DIFFERENTIAL METHOD BLD: ABNORMAL
EOSINOPHIL # BLD AUTO: 0.08 K/UL (ref 0–0.5)
EOSINOPHIL NFR BLD AUTO: 1.5 % (ref 1–4)
ERYTHROCYTE [DISTWIDTH] IN BLOOD BY AUTOMATED COUNT: 14.9 % (ref 11.5–14.5)
GLUCOSE SERPL-MCNC: 134 MG/DL (ref 74–106)
HCT VFR BLD AUTO: 29.1 % (ref 38–47)
HGB BLD-MCNC: 9.5 G/DL (ref 12–16)
LYMPHOCYTES # BLD AUTO: 1.26 K/UL (ref 1–4.8)
LYMPHOCYTES NFR BLD AUTO: 23.7 % (ref 27–41)
LYMPHOCYTES NFR BLD MANUAL: 18 % (ref 27–41)
MCH RBC QN AUTO: 31.4 PG (ref 27–31)
MCHC RBC AUTO-ENTMCNC: 32.6 G/DL (ref 32–36)
MCV RBC AUTO: 96 FL (ref 80–96)
MONOCYTES # BLD AUTO: 0.56 K/UL (ref 0–0.8)
MONOCYTES NFR BLD AUTO: 10.5 % (ref 2–6)
MONOCYTES NFR BLD MANUAL: 13 % (ref 2–6)
MPC BLD CALC-MCNC: 8.8 FL (ref 9.4–12.4)
NEUTROPHILS # BLD AUTO: 3.41 K/UL (ref 1.8–7.7)
NEUTROPHILS NFR BLD AUTO: 64.1 % (ref 53–65)
NEUTS BAND NFR BLD MANUAL: 1 % (ref 1–5)
NEUTS SEG NFR BLD MANUAL: 68 % (ref 50–62)
PLATELET # BLD AUTO: 248 K/UL (ref 150–400)
PLATELET MORPHOLOGY: NORMAL
POTASSIUM SERPL-SCNC: 3 MMOL/L (ref 3.5–5.1)
RBC # BLD AUTO: 3.03 M/UL (ref 4.2–5.4)
RBC MORPH BLD: NORMAL
SODIUM SERPL-SCNC: 128 MMOL/L (ref 136–145)
WBC # BLD AUTO: 5.32 K/UL (ref 4.5–11)

## 2021-11-26 PROCEDURE — 27000221 HC OXYGEN, UP TO 24 HOURS

## 2021-11-26 PROCEDURE — 94640 AIRWAY INHALATION TREATMENT: CPT

## 2021-11-26 PROCEDURE — 97110 THERAPEUTIC EXERCISES: CPT | Mod: CQ

## 2021-11-26 PROCEDURE — 63600175 PHARM REV CODE 636 W HCPCS: Performed by: NURSE PRACTITIONER

## 2021-11-26 PROCEDURE — 27000958

## 2021-11-26 PROCEDURE — 99900035 HC TECH TIME PER 15 MIN (STAT)

## 2021-11-26 PROCEDURE — 25000003 PHARM REV CODE 250: Performed by: HOSPITALIST

## 2021-11-26 PROCEDURE — 25000003 PHARM REV CODE 250: Performed by: NURSE PRACTITIONER

## 2021-11-26 PROCEDURE — 25000242 PHARM REV CODE 250 ALT 637 W/ HCPCS: Performed by: NURSE PRACTITIONER

## 2021-11-26 PROCEDURE — 97530 THERAPEUTIC ACTIVITIES: CPT | Mod: CQ

## 2021-11-26 PROCEDURE — 94761 N-INVAS EAR/PLS OXIMETRY MLT: CPT

## 2021-11-26 PROCEDURE — 36415 COLL VENOUS BLD VENIPUNCTURE: CPT | Performed by: NURSE PRACTITIONER

## 2021-11-26 PROCEDURE — 80048 BASIC METABOLIC PNL TOTAL CA: CPT | Performed by: NURSE PRACTITIONER

## 2021-11-26 PROCEDURE — 11000004 HC SNF PRIVATE

## 2021-11-26 PROCEDURE — 85025 COMPLETE CBC W/AUTO DIFF WBC: CPT | Performed by: NURSE PRACTITIONER

## 2021-11-26 RX ORDER — FUROSEMIDE 40 MG/1
40 TABLET ORAL 2 TIMES DAILY
Status: COMPLETED | OUTPATIENT
Start: 2021-11-26 | End: 2021-11-29

## 2021-11-26 RX ADMIN — ACETAMINOPHEN 650 MG: 325 TABLET ORAL at 06:11

## 2021-11-26 RX ADMIN — PANTOPRAZOLE SODIUM 40 MG: 40 TABLET, DELAYED RELEASE ORAL at 08:11

## 2021-11-26 RX ADMIN — IPRATROPIUM BROMIDE AND ALBUTEROL SULFATE 3 ML: .5; 3 SOLUTION RESPIRATORY (INHALATION) at 07:11

## 2021-11-26 RX ADMIN — HYDRALAZINE HYDROCHLORIDE 25 MG: 25 TABLET ORAL at 03:11

## 2021-11-26 RX ADMIN — POTASSIUM BICARBONATE 20 MEQ: 391 TABLET, EFFERVESCENT ORAL at 08:11

## 2021-11-26 RX ADMIN — IPRATROPIUM BROMIDE AND ALBUTEROL SULFATE 3 ML: .5; 3 SOLUTION RESPIRATORY (INHALATION) at 01:11

## 2021-11-26 RX ADMIN — ROPINIROLE 2 MG: 1 TABLET, FILM COATED ORAL at 08:11

## 2021-11-26 RX ADMIN — DIPHENHYDRAMINE HYDROCHLORIDE 25 MG: 25 TABLET ORAL at 08:11

## 2021-11-26 RX ADMIN — HYDRALAZINE HYDROCHLORIDE 25 MG: 25 TABLET ORAL at 08:11

## 2021-11-26 RX ADMIN — OXYBUTYNIN CHLORIDE 10 MG: 5 TABLET, EXTENDED RELEASE ORAL at 08:11

## 2021-11-26 RX ADMIN — ESTRADIOL 1 MG: 0.5 TABLET ORAL at 08:11

## 2021-11-26 RX ADMIN — DIPHENHYDRAMINE HYDROCHLORIDE 25 MG: 25 TABLET ORAL at 01:11

## 2021-11-26 RX ADMIN — AMLODIPINE BESYLATE 10 MG: 10 TABLET ORAL at 08:11

## 2021-11-26 RX ADMIN — ACETAMINOPHEN 650 MG: 325 TABLET ORAL at 05:11

## 2021-11-26 RX ADMIN — BUDESONIDE 0.5 MG: 0.5 INHALANT ORAL at 07:11

## 2021-11-26 RX ADMIN — ENOXAPARIN SODIUM 40 MG: 40 INJECTION SUBCUTANEOUS at 04:11

## 2021-11-26 RX ADMIN — ROPINIROLE 2 MG: 1 TABLET, FILM COATED ORAL at 03:11

## 2021-11-26 RX ADMIN — MELATONIN 6 MG: 3 TAB ORAL at 08:11

## 2021-11-26 RX ADMIN — FUROSEMIDE 40 MG: 40 TABLET ORAL at 08:11

## 2021-11-26 RX ADMIN — FUROSEMIDE 40 MG: 40 TABLET ORAL at 03:11

## 2021-11-26 RX ADMIN — SENNOSIDES AND DOCUSATE SODIUM 1 TABLET: 8.6; 5 TABLET ORAL at 08:11

## 2021-11-26 RX ADMIN — THYROID 90 MG: 90 TABLET ORAL at 05:11

## 2021-11-26 RX ADMIN — VENLAFAXINE HYDROCHLORIDE 150 MG: 75 CAPSULE, EXTENDED RELEASE ORAL at 08:11

## 2021-11-26 NOTE — PT/OT/SLP PROGRESS
Physical Therapy  Treatment    Courtney Pickens   MRN: [id number]   Admitting Diagnosis: Pneumonia    PT Received On: 11/26/21  PT Start Time: 1045     PT Stop Time: 1117    PT Total Time (min): 32 min       Billable Minutes:  Therapeutic Activity 14 and Therapeutic Exercise 18       PT/PTA: PTA     PTA Visit Number: 1       General Precautions: Standard, fall  Orthopedic Precautions: N/A     Subjective:    Patient agreeable to participate in therapy.         Objective:   Patient found with: oxygen    Functional Mobility:  Bed Mobility:        Transfers: sit<>stand with supervision.       Gait: Independent >200' pushing WC         Balance:   Static Sit: GOOD: Takes MODERATE challenges from all directions  Dynamic Sit: GOOD: Maintains balance through MODERATE excursions of active trunk movement  Static Stand: GOOD: Takes MODERATE challenges from all directions  Dynamic stand: GOOD: Needs SUPERVISION only during gait and able to self right with moderate LOB       Therapeutic Activities and Exercises:  Exercises 99059    Heelslide 8'   LAQ    Hip ADD/ABD    Bike 10'   Nustep    sit <>stand x10 to improve endurance.           Activities 49220    Nustep 10'   WC self propulsion    transfer training        Gait       O2 administered throughout PT session and tolerates well without sig request of rest breaks.      Patient left up in chair with call button in reach.    Assessment:  Courtney Pickens is a 81 y.o. female with a medical diagnosis of Pneumonia and presents with mild weakness and decreased tolerance to activity.    Rehab identified problem list/impairments:      Rehab potential is good.    Activity tolerance: Fair    Discharge recommendations:       Barriers to discharge:      Equipment recommendations:       GOALS:   Multidisciplinary Problems     Physical Therapy Goals        Problem: Physical Therapy Goal    Goal Priority Disciplines Outcome Goal Variances Interventions   Physical Therapy Goal     PT, PT/OT  Ongoing, Progressing     Description:   Goals     Short Term Goals  2 weeks    1) I HOME EXERCISE PROGRAM  2) I in room transfers  3) Gait 500 feet with no device  and No assistance  4) I Wheel Chair skills    Long Term Goals  4 weeks    1) Discharge to home  2) tolerate 45 mins of activity  3) up/down 10 steps I with rail                      PLAN:    Patient to be seen 5 x/week  to address the above listed problems via    Plan of Care expires: 12/24/21  Plan of Care reviewed with: patient         11/26/2021

## 2021-11-26 NOTE — PROGRESS NOTES
Patient and family complains that patient peripheral edema has worsened. 2+ peripheral edema noted on PE. Potassium level also down to 3.0 this AM. Patient's potassium supplement was recently changed to liquid form due to trouble swallowing tablet. However, nurse states patient has not been consuming all her liquid potassium. Patient has also had a 4 lb weight gain over past 2 days. Will increase Lasix to 40 Bid and Potassium to 20 Bid for next 3 days. Nurse instructed to monitor potassium administration to make sure patient is taking it correctly.

## 2021-11-26 NOTE — CARE UPDATE
Family and patient reports concern regarding weight gain, lower edema and increased shortness of breath. NP is aware of concerns and has increased Lasix and Potassium. Patient's daughter has concerns that changing Amlodipine has caused fluid retention and would like to discuss patient being put back on Irbesartan. NP will discuss with rounding physician

## 2021-11-26 NOTE — PLAN OF CARE
Problem: Adult Inpatient Plan of Care  Goal: Plan of Care Review  Outcome: Ongoing, Progressing  Goal: Rounds/Family Conference  Outcome: Ongoing, Progressing

## 2021-11-27 PROCEDURE — 27000958

## 2021-11-27 PROCEDURE — 63600175 PHARM REV CODE 636 W HCPCS: Performed by: NURSE PRACTITIONER

## 2021-11-27 PROCEDURE — 11000004 HC SNF PRIVATE

## 2021-11-27 PROCEDURE — 25000003 PHARM REV CODE 250: Performed by: NURSE PRACTITIONER

## 2021-11-27 PROCEDURE — 27000221 HC OXYGEN, UP TO 24 HOURS

## 2021-11-27 PROCEDURE — 94761 N-INVAS EAR/PLS OXIMETRY MLT: CPT

## 2021-11-27 PROCEDURE — 25000003 PHARM REV CODE 250: Performed by: HOSPITALIST

## 2021-11-27 PROCEDURE — 94640 AIRWAY INHALATION TREATMENT: CPT

## 2021-11-27 PROCEDURE — 25000242 PHARM REV CODE 250 ALT 637 W/ HCPCS: Performed by: NURSE PRACTITIONER

## 2021-11-27 RX ADMIN — FUROSEMIDE 40 MG: 40 TABLET ORAL at 09:11

## 2021-11-27 RX ADMIN — HYDRALAZINE HYDROCHLORIDE 25 MG: 25 TABLET ORAL at 08:11

## 2021-11-27 RX ADMIN — ZINC OXIDE TOPICAL OINT.: at 09:11

## 2021-11-27 RX ADMIN — HYDRALAZINE HYDROCHLORIDE 25 MG: 25 TABLET ORAL at 03:11

## 2021-11-27 RX ADMIN — VENLAFAXINE HYDROCHLORIDE 150 MG: 75 CAPSULE, EXTENDED RELEASE ORAL at 09:11

## 2021-11-27 RX ADMIN — OXYBUTYNIN CHLORIDE 10 MG: 5 TABLET, EXTENDED RELEASE ORAL at 08:11

## 2021-11-27 RX ADMIN — BUDESONIDE 0.5 MG: 0.5 INHALANT ORAL at 08:11

## 2021-11-27 RX ADMIN — BUDESONIDE 0.5 MG: 0.5 INHALANT ORAL at 07:11

## 2021-11-27 RX ADMIN — ROPINIROLE 2 MG: 1 TABLET, FILM COATED ORAL at 08:11

## 2021-11-27 RX ADMIN — ANTACID TABLETS 500 MG: 500 TABLET, CHEWABLE ORAL at 12:11

## 2021-11-27 RX ADMIN — FUROSEMIDE 40 MG: 40 TABLET ORAL at 05:11

## 2021-11-27 RX ADMIN — ENOXAPARIN SODIUM 40 MG: 40 INJECTION SUBCUTANEOUS at 05:11

## 2021-11-27 RX ADMIN — ACETAMINOPHEN 650 MG: 325 TABLET ORAL at 01:11

## 2021-11-27 RX ADMIN — IPRATROPIUM BROMIDE AND ALBUTEROL SULFATE 3 ML: .5; 3 SOLUTION RESPIRATORY (INHALATION) at 02:11

## 2021-11-27 RX ADMIN — IPRATROPIUM BROMIDE AND ALBUTEROL SULFATE 3 ML: .5; 3 SOLUTION RESPIRATORY (INHALATION) at 08:11

## 2021-11-27 RX ADMIN — POTASSIUM BICARBONATE 20 MEQ: 391 TABLET, EFFERVESCENT ORAL at 08:11

## 2021-11-27 RX ADMIN — ROPINIROLE 2 MG: 1 TABLET, FILM COATED ORAL at 03:11

## 2021-11-27 RX ADMIN — AMLODIPINE BESYLATE 10 MG: 10 TABLET ORAL at 08:11

## 2021-11-27 RX ADMIN — THYROID 90 MG: 90 TABLET ORAL at 05:11

## 2021-11-27 RX ADMIN — PANTOPRAZOLE SODIUM 40 MG: 40 TABLET, DELAYED RELEASE ORAL at 08:11

## 2021-11-27 RX ADMIN — ESTRADIOL 1 MG: 0.5 TABLET ORAL at 09:11

## 2021-11-27 RX ADMIN — ACETAMINOPHEN 650 MG: 325 TABLET ORAL at 08:11

## 2021-11-27 RX ADMIN — IPRATROPIUM BROMIDE AND ALBUTEROL SULFATE 3 ML: .5; 3 SOLUTION RESPIRATORY (INHALATION) at 07:11

## 2021-11-27 NOTE — NURSING
"Notified ER NP of daughters concerns, Np stated we could hold 6pm dose of lasix tonight and she would change time to earlier starting 11/28/2021, notified patient's daughter of change in lasix, and suggestion to hold lasix tonight, daughter stated "no, she needs lasix tonight" and she would just have to see what she could do about it Monday.notified ER NP of what daughter's reply. Patient's daughter stated she did not want her mother to have amlodipine on 11/28/21, notified ER NP of daughters statements, NP stated " that is fine , she can refuse"   "

## 2021-11-27 NOTE — NURSING
"Patient's daughter called and stated" Is my mother getting her lasix twice a day? I told her yes she is getting it at 9am and 6pm. She replied" this is ridiculous!, she cannot get that lasix that late at night , she will be up all night peeing!and that she needed because of edema to jeffrey. Feet. I told her that I had instructed patient to lay down and we elvated her feet but she will only stay there for a short while and is up ambulating in room and sitting in w/c with feet dangling down, she stated " I know", I told her I would talk with ER NP and tell them her concerns. She stated " I have talked with them before. " Notified ER NP of daughters concerns.   "

## 2021-11-28 PROCEDURE — 25000003 PHARM REV CODE 250: Performed by: HOSPITALIST

## 2021-11-28 PROCEDURE — 99900035 HC TECH TIME PER 15 MIN (STAT)

## 2021-11-28 PROCEDURE — 25000003 PHARM REV CODE 250: Performed by: NURSE PRACTITIONER

## 2021-11-28 PROCEDURE — 63600175 PHARM REV CODE 636 W HCPCS: Performed by: NURSE PRACTITIONER

## 2021-11-28 PROCEDURE — 27000958

## 2021-11-28 PROCEDURE — 27000221 HC OXYGEN, UP TO 24 HOURS

## 2021-11-28 PROCEDURE — 25000242 PHARM REV CODE 250 ALT 637 W/ HCPCS: Performed by: NURSE PRACTITIONER

## 2021-11-28 PROCEDURE — 94761 N-INVAS EAR/PLS OXIMETRY MLT: CPT

## 2021-11-28 PROCEDURE — 11000004 HC SNF PRIVATE

## 2021-11-28 PROCEDURE — 94640 AIRWAY INHALATION TREATMENT: CPT

## 2021-11-28 RX ADMIN — ROPINIROLE 2 MG: 1 TABLET, FILM COATED ORAL at 08:11

## 2021-11-28 RX ADMIN — OXYBUTYNIN CHLORIDE 10 MG: 5 TABLET, EXTENDED RELEASE ORAL at 08:11

## 2021-11-28 RX ADMIN — DIPHENHYDRAMINE HYDROCHLORIDE 25 MG: 25 TABLET ORAL at 08:11

## 2021-11-28 RX ADMIN — POTASSIUM BICARBONATE 20 MEQ: 391 TABLET, EFFERVESCENT ORAL at 08:11

## 2021-11-28 RX ADMIN — IPRATROPIUM BROMIDE AND ALBUTEROL SULFATE 3 ML: .5; 3 SOLUTION RESPIRATORY (INHALATION) at 08:11

## 2021-11-28 RX ADMIN — MELATONIN 6 MG: 3 TAB ORAL at 08:11

## 2021-11-28 RX ADMIN — ACETAMINOPHEN 650 MG: 325 TABLET ORAL at 06:11

## 2021-11-28 RX ADMIN — ESTRADIOL 1 MG: 0.5 TABLET ORAL at 08:11

## 2021-11-28 RX ADMIN — ENOXAPARIN SODIUM 40 MG: 40 INJECTION SUBCUTANEOUS at 04:11

## 2021-11-28 RX ADMIN — THYROID 90 MG: 90 TABLET ORAL at 05:11

## 2021-11-28 RX ADMIN — FUROSEMIDE 40 MG: 40 TABLET ORAL at 02:11

## 2021-11-28 RX ADMIN — BUDESONIDE 0.5 MG: 0.5 INHALANT ORAL at 08:11

## 2021-11-28 RX ADMIN — HYDRALAZINE HYDROCHLORIDE 25 MG: 25 TABLET ORAL at 08:11

## 2021-11-28 RX ADMIN — ZINC OXIDE TOPICAL OINT.: at 09:11

## 2021-11-28 RX ADMIN — ROPINIROLE 2 MG: 1 TABLET, FILM COATED ORAL at 02:11

## 2021-11-28 RX ADMIN — FUROSEMIDE 40 MG: 40 TABLET ORAL at 08:11

## 2021-11-28 RX ADMIN — SENNOSIDES AND DOCUSATE SODIUM 1 TABLET: 8.6; 5 TABLET ORAL at 08:11

## 2021-11-28 RX ADMIN — VENLAFAXINE HYDROCHLORIDE 150 MG: 75 CAPSULE, EXTENDED RELEASE ORAL at 08:11

## 2021-11-28 RX ADMIN — IPRATROPIUM BROMIDE AND ALBUTEROL SULFATE 3 ML: .5; 3 SOLUTION RESPIRATORY (INHALATION) at 01:11

## 2021-11-28 RX ADMIN — PANTOPRAZOLE SODIUM 40 MG: 40 TABLET, DELAYED RELEASE ORAL at 08:11

## 2021-11-28 RX ADMIN — HYDRALAZINE HYDROCHLORIDE 25 MG: 25 TABLET ORAL at 02:11

## 2021-11-28 NOTE — NURSING
Resting in bed, eyes closed, resp even and unlabored, snoring at present, O2 intact via nasal cannula , no s/s distress noted

## 2021-11-28 NOTE — NURSING
Consulted with CAMILA Figueroa NP about edema in jeffrey. Feet and was advised to await for orders from Dr. Valencia on Monday d/t recent increase in lasix therapy. Patient toliets self, nun's cap remains on toliet, patient has been moving nun's cap off of toliet and urinating in commode and flushing it.Frequent checks on patient.

## 2021-11-29 LAB
25(OH)D3 SERPL-MCNC: 27.4 NG/ML
ALBUMIN SERPL BCP-MCNC: 2.9 G/DL (ref 3.5–5)
ALBUMIN/GLOB SERPL: 0.6 {RATIO}
ALP SERPL-CCNC: 59 U/L (ref 55–142)
ALT SERPL W P-5'-P-CCNC: 30 U/L (ref 13–56)
ANION GAP SERPL CALCULATED.3IONS-SCNC: 12 MMOL/L (ref 7–16)
AST SERPL W P-5'-P-CCNC: 46 U/L (ref 15–37)
BASOPHILS # BLD AUTO: 0.01 K/UL (ref 0–0.2)
BASOPHILS NFR BLD AUTO: 0.3 % (ref 0–1)
BILIRUB SERPL-MCNC: 0.7 MG/DL (ref 0–1.2)
BUN SERPL-MCNC: 12 MG/DL (ref 7–18)
BUN/CREAT SERPL: 11 (ref 6–20)
CALCIUM SERPL-MCNC: 8.6 MG/DL (ref 8.5–10.1)
CHLORIDE SERPL-SCNC: 85 MMOL/L (ref 98–107)
CO2 SERPL-SCNC: 27 MMOL/L (ref 21–32)
CREAT SERPL-MCNC: 1.14 MG/DL (ref 0.55–1.02)
DIFFERENTIAL METHOD BLD: ABNORMAL
EOSINOPHIL # BLD AUTO: 0.04 K/UL (ref 0–0.5)
EOSINOPHIL NFR BLD AUTO: 1.1 % (ref 1–4)
ERYTHROCYTE [DISTWIDTH] IN BLOOD BY AUTOMATED COUNT: 13.5 % (ref 11.5–14.5)
GLOBULIN SER-MCNC: 4.6 G/DL (ref 2–4)
GLUCOSE SERPL-MCNC: 84 MG/DL (ref 74–106)
HCT VFR BLD AUTO: 28.5 % (ref 38–47)
HGB BLD-MCNC: 9.5 G/DL (ref 12–16)
LYMPHOCYTES # BLD AUTO: 0.81 K/UL (ref 1–4.8)
LYMPHOCYTES NFR BLD AUTO: 21.8 % (ref 27–41)
LYMPHOCYTES NFR BLD MANUAL: 23 % (ref 27–41)
MCH RBC QN AUTO: 30.4 PG (ref 27–31)
MCHC RBC AUTO-ENTMCNC: 33.3 G/DL (ref 32–36)
MCV RBC AUTO: 91.3 FL (ref 80–96)
MONOCYTES # BLD AUTO: 0.39 K/UL (ref 0–0.8)
MONOCYTES NFR BLD AUTO: 10.5 % (ref 2–6)
MONOCYTES NFR BLD MANUAL: 10 % (ref 2–6)
MPC BLD CALC-MCNC: 8.4 FL (ref 9.4–12.4)
NEUTROPHILS # BLD AUTO: 2.46 K/UL (ref 1.8–7.7)
NEUTROPHILS NFR BLD AUTO: 66.3 % (ref 53–65)
NEUTS SEG NFR BLD MANUAL: 67 % (ref 50–62)
NT-PROBNP SERPL-MCNC: 3338 PG/ML (ref 1–450)
PLATELET # BLD AUTO: 371 K/UL (ref 150–400)
POTASSIUM SERPL-SCNC: 3.5 MMOL/L (ref 3.5–5.1)
PROT SERPL-MCNC: 7.5 G/DL (ref 6.4–8.2)
RBC # BLD AUTO: 3.12 M/UL (ref 4.2–5.4)
SODIUM SERPL-SCNC: 120 MMOL/L (ref 136–145)
TSH SERPL DL<=0.005 MIU/L-ACNC: 11.97 UIU/ML (ref 0.36–3.74)
VIT B12 SERPL-MCNC: 1127 PG/ML (ref 193–986)
WBC # BLD AUTO: 3.71 K/UL (ref 4.5–11)

## 2021-11-29 PROCEDURE — 99900035 HC TECH TIME PER 15 MIN (STAT)

## 2021-11-29 PROCEDURE — 11000004 HC SNF PRIVATE

## 2021-11-29 PROCEDURE — 36415 COLL VENOUS BLD VENIPUNCTURE: CPT | Performed by: NURSE PRACTITIONER

## 2021-11-29 PROCEDURE — 25000242 PHARM REV CODE 250 ALT 637 W/ HCPCS: Performed by: NURSE PRACTITIONER

## 2021-11-29 PROCEDURE — 94640 AIRWAY INHALATION TREATMENT: CPT

## 2021-11-29 PROCEDURE — 25000003 PHARM REV CODE 250: Performed by: NURSE PRACTITIONER

## 2021-11-29 PROCEDURE — 82306 VITAMIN D 25 HYDROXY: CPT | Performed by: NURSE PRACTITIONER

## 2021-11-29 PROCEDURE — 27000221 HC OXYGEN, UP TO 24 HOURS

## 2021-11-29 PROCEDURE — 25000003 PHARM REV CODE 250: Performed by: HOSPITALIST

## 2021-11-29 PROCEDURE — 27000958

## 2021-11-29 PROCEDURE — 97116 GAIT TRAINING THERAPY: CPT

## 2021-11-29 PROCEDURE — 84443 ASSAY THYROID STIM HORMONE: CPT | Performed by: NURSE PRACTITIONER

## 2021-11-29 PROCEDURE — 97530 THERAPEUTIC ACTIVITIES: CPT

## 2021-11-29 PROCEDURE — 97110 THERAPEUTIC EXERCISES: CPT

## 2021-11-29 PROCEDURE — 63600175 PHARM REV CODE 636 W HCPCS: Performed by: NURSE PRACTITIONER

## 2021-11-29 PROCEDURE — 82607 VITAMIN B-12: CPT | Performed by: NURSE PRACTITIONER

## 2021-11-29 PROCEDURE — 83880 ASSAY OF NATRIURETIC PEPTIDE: CPT | Performed by: NURSE PRACTITIONER

## 2021-11-29 PROCEDURE — 80053 COMPREHEN METABOLIC PANEL: CPT | Performed by: NURSE PRACTITIONER

## 2021-11-29 PROCEDURE — 85025 COMPLETE CBC W/AUTO DIFF WBC: CPT | Performed by: NURSE PRACTITIONER

## 2021-11-29 PROCEDURE — 94761 N-INVAS EAR/PLS OXIMETRY MLT: CPT

## 2021-11-29 RX ORDER — LOSARTAN POTASSIUM 25 MG/1
25 TABLET ORAL DAILY
Status: DISCONTINUED | OUTPATIENT
Start: 2021-11-29 | End: 2021-12-02

## 2021-11-29 RX ORDER — THYROID 15 MG/1
15 TABLET ORAL
Status: DISCONTINUED | OUTPATIENT
Start: 2021-11-30 | End: 2021-12-06 | Stop reason: HOSPADM

## 2021-11-29 RX ADMIN — BUDESONIDE 0.5 MG: 0.5 INHALANT ORAL at 07:11

## 2021-11-29 RX ADMIN — HYDRALAZINE HYDROCHLORIDE 25 MG: 25 TABLET ORAL at 08:11

## 2021-11-29 RX ADMIN — IPRATROPIUM BROMIDE AND ALBUTEROL SULFATE 3 ML: .5; 3 SOLUTION RESPIRATORY (INHALATION) at 02:11

## 2021-11-29 RX ADMIN — IPRATROPIUM BROMIDE AND ALBUTEROL SULFATE 3 ML: .5; 3 SOLUTION RESPIRATORY (INHALATION) at 07:11

## 2021-11-29 RX ADMIN — HYDRALAZINE HYDROCHLORIDE 25 MG: 25 TABLET ORAL at 03:11

## 2021-11-29 RX ADMIN — ACETAMINOPHEN 650 MG: 325 TABLET ORAL at 01:11

## 2021-11-29 RX ADMIN — ROPINIROLE 2 MG: 1 TABLET, FILM COATED ORAL at 08:11

## 2021-11-29 RX ADMIN — ACETAMINOPHEN 650 MG: 325 TABLET ORAL at 02:11

## 2021-11-29 RX ADMIN — ROPINIROLE 2 MG: 1 TABLET, FILM COATED ORAL at 02:11

## 2021-11-29 RX ADMIN — THYROID 90 MG: 90 TABLET ORAL at 05:11

## 2021-11-29 RX ADMIN — SENNOSIDES AND DOCUSATE SODIUM 1 TABLET: 8.6; 5 TABLET ORAL at 08:11

## 2021-11-29 RX ADMIN — IPRATROPIUM BROMIDE AND ALBUTEROL SULFATE 3 ML: .5; 3 SOLUTION RESPIRATORY (INHALATION) at 06:11

## 2021-11-29 RX ADMIN — BUDESONIDE 0.5 MG: 0.5 INHALANT ORAL at 08:11

## 2021-11-29 RX ADMIN — PANTOPRAZOLE SODIUM 40 MG: 40 TABLET, DELAYED RELEASE ORAL at 08:11

## 2021-11-29 RX ADMIN — ENOXAPARIN SODIUM 40 MG: 40 INJECTION SUBCUTANEOUS at 05:11

## 2021-11-29 RX ADMIN — DIPHENHYDRAMINE HYDROCHLORIDE 25 MG: 25 TABLET ORAL at 02:11

## 2021-11-29 RX ADMIN — OXYBUTYNIN CHLORIDE 10 MG: 5 TABLET, EXTENDED RELEASE ORAL at 08:11

## 2021-11-29 RX ADMIN — DIPHENHYDRAMINE HYDROCHLORIDE 25 MG: 25 TABLET ORAL at 08:11

## 2021-11-29 RX ADMIN — POTASSIUM BICARBONATE 20 MEQ: 391 TABLET, EFFERVESCENT ORAL at 08:11

## 2021-11-29 RX ADMIN — MELATONIN 6 MG: 3 TAB ORAL at 08:11

## 2021-11-29 RX ADMIN — ACETAMINOPHEN 650 MG: 325 TABLET ORAL at 08:11

## 2021-11-29 RX ADMIN — ESTRADIOL 1 MG: 0.5 TABLET ORAL at 08:11

## 2021-11-29 RX ADMIN — AMLODIPINE BESYLATE 10 MG: 10 TABLET ORAL at 08:11

## 2021-11-29 RX ADMIN — FUROSEMIDE 40 MG: 40 TABLET ORAL at 08:11

## 2021-11-29 RX ADMIN — VENLAFAXINE HYDROCHLORIDE 150 MG: 75 CAPSULE, EXTENDED RELEASE ORAL at 08:11

## 2021-11-29 NOTE — PLAN OF CARE
Patient discussed in multidisciplinary meeting. Participating in therapy when she is able.  Patient continues to feel ill and c/o shortness of breath. Lasix was increased on 12/26, has had significant weight loss since. NP is ordering labs/changing medications today. Will continue to monitor patient's needs.

## 2021-11-29 NOTE — PLAN OF CARE
"  Problem: Occupational Therapy Goal  Goal: Occupational Therapy Goal  Description: Goals to be met by: d/c     Patient will increase functional independence with ADLs by performing:    LE Dressing with [place]. PROGRESSING  Increased functional strength to WFL for all ADL tasks. PROGRESSING  Upper extremity exercise program x30 reps per handout, with independence completing chair seated exercises.  PROGRESSING    Pt is somewhat hesitant and needs encouragement to participate fully.  Pt states today, "I think I might get to go home tomorrow."  OT advised her we would speak with DO regarding how things are progressing with fluid levels and CHF at this time.  Pt agreeable.    Outcome: Ongoing, Progressing     "

## 2021-11-29 NOTE — PLAN OF CARE
Problem: UTI (Urinary Tract Infection)  Goal: Improved Infection Symptoms  Outcome: Ongoing, Progressing     Problem: Fall Injury Risk  Goal: Absence of Fall and Fall-Related Injury  Outcome: Ongoing, Progressing     Problem: Skin Injury Risk Increased  Goal: Skin Health and Integrity  Outcome: Ongoing, Progressing

## 2021-11-29 NOTE — PLAN OF CARE
Problem: UTI (Urinary Tract Infection)  Goal: Improved Infection Symptoms  11/29/2021 1051 by Demetra Kolb RN  Outcome: Met  11/29/2021 1050 by Demetra Kolb RN  Outcome: Ongoing, Progressing     Problem: Fall Injury Risk  Goal: Absence of Fall and Fall-Related Injury  11/29/2021 1051 by Demetra Kolb RN  Outcome: Ongoing, Progressing  11/29/2021 1050 by Demetra Kolb RN  Outcome: Ongoing, Progressing     Problem: Skin Injury Risk Increased  Goal: Skin Health and Integrity  11/29/2021 1051 by Demetra Kolb RN  Outcome: Ongoing, Progressing  11/29/2021 1050 by Demetra Kolb RN  Outcome: Ongoing, Progressing

## 2021-11-29 NOTE — PLAN OF CARE
Problem: Adult Inpatient Plan of Care  Goal: Plan of Care Review  11/28/2021 1919 by Jovany Evans LPN  Outcome: Ongoing, Progressing  11/28/2021 1858 by Jovany Evans LPN  Outcome: Ongoing, Progressing  Goal: Patient-Specific Goal (Individualization)  11/28/2021 1919 by Jovany Evans LPN  Outcome: Ongoing, Progressing  11/28/2021 1858 by Jovany Evans LPN  Outcome: Ongoing, Progressing  Goal: Absence of Hospital-Acquired Illness or Injury  11/28/2021 1919 by Jovany Evans LPN  Outcome: Ongoing, Progressing  11/28/2021 1858 by Jovany Evans LPN  Outcome: Ongoing, Progressing  Goal: Optimal Comfort and Wellbeing  11/28/2021 1919 by Jovany Evans LPN  Outcome: Ongoing, Progressing  11/28/2021 1858 by Jovany Evans LPN  Outcome: Ongoing, Progressing  Goal: Readiness for Transition of Care  11/28/2021 1919 by Jovany Evans LPN  Outcome: Ongoing, Progressing  11/28/2021 1858 by Jovany Evans LPN  Outcome: Ongoing, Progressing  Goal: Rounds/Family Conference  11/28/2021 1919 by Jovany Evans LPN  Outcome: Ongoing, Progressing  11/28/2021 1858 by Jovany Evans LPN  Outcome: Ongoing, Progressing     Problem: Infection (Pneumonia)  Goal: Resolution of Infection Signs/Symptoms  11/28/2021 1919 by Jovany Evans LPN  Outcome: Ongoing, Progressing  11/28/2021 1858 by Jovany Evans LPN  Outcome: Ongoing, Progressing     Problem: UTI (Urinary Tract Infection)  Goal: Improved Infection Symptoms  11/28/2021 1919 by Jovany Evans LPN  Outcome: Ongoing, Progressing  11/28/2021 1858 by Jovany Evans LPN  Outcome: Ongoing, Progressing     Problem: Fall Injury Risk  Goal: Absence of Fall and Fall-Related Injury  11/28/2021 1919 by Jovany Evans LPN  Outcome: Ongoing, Progressing  11/28/2021 1858 by Jovany Evans LPN  Outcome: Ongoing, Progressing     Problem: Skin Injury Risk Increased  Goal: Skin Health and Integrity  11/28/2021 1919 by Jovany Evans LPN  Outcome: Ongoing,  Progressing  11/28/2021 1858 by Jovany Evans LPN  Outcome: Ongoing, Progressing

## 2021-11-29 NOTE — SUBJECTIVE & OBJECTIVE
Interval History: 5 days    Review of Systems   Constitutional: Positive for activity change and fatigue. Negative for chills and fever.   HENT: Negative for congestion.    Respiratory: Positive for cough, shortness of breath and wheezing.    Cardiovascular: Positive for leg swelling. Negative for chest pain.   Genitourinary: Negative.    Musculoskeletal: Positive for myalgias.   Skin: Negative for color change.   Neurological: Negative for dizziness and light-headedness.   Psychiatric/Behavioral: Negative.      Objective:     Vital Signs (Most Recent):  Temp: 98 °F (36.7 °C) (11/29/21 0828)  Pulse: 63 (11/29/21 0828)  Resp: 18 (11/29/21 0828)  BP: (!) 151/54 (11/29/21 0828)  SpO2: 96 % (11/29/21 0828) Vital Signs (24h Range):  Temp:  [97.8 °F (36.6 °C)-98 °F (36.7 °C)] 98 °F (36.7 °C)  Pulse:  [63-84] 63  Resp:  [17-24] 18  SpO2:  [96 %-98 %] 96 %  BP: (130-151)/(51-54) 151/54     Weight: 76.1 kg (167 lb 12.3 oz)  Body mass index is 30.69 kg/m².    Intake/Output Summary (Last 24 hours) at 11/29/2021 1224  Last data filed at 11/28/2021 2229  Gross per 24 hour   Intake 360 ml   Output 150 ml   Net 210 ml      Physical Exam  Vitals and nursing note reviewed.   Constitutional:       General: She is not in acute distress.     Appearance: She is obese. She is ill-appearing.   HENT:      Nose: No congestion.      Mouth/Throat:      Mouth: Mucous membranes are moist.   Cardiovascular:      Rate and Rhythm: Normal rate and regular rhythm.   Pulmonary:      Breath sounds: Wheezing and rhonchi present. No rales.   Abdominal:      Palpations: Abdomen is soft.   Musculoskeletal:      Right lower leg: Edema present.      Left lower leg: Edema present.   Skin:     General: Skin is warm and dry.   Neurological:      Mental Status: She is alert.   Psychiatric:         Mood and Affect: Mood normal.         Significant Labs: All pertinent labs within the past 24 hours have been reviewed.    Significant Imaging: I have reviewed all  pertinent imaging results/findings within the past 24 hours.  none

## 2021-11-29 NOTE — PT/OT/SLP PROGRESS
Physical Therapy  Treatment    Courtney Pickens   MRN: [id number]   Admitting Diagnosis: Pneumonia    PT Received On: 11/29/21  PT Start Time: 0800     PT Stop Time: 0900    PT Total Time (min): 60 min       Billable Minutes:  Gait Training 11, Therapeutic Activity 15 and Therapeutic Exercise 30    Treatment Type: Evaluation,Treatment  PT/PTA: PT     PTA Visit Number: 0       General Precautions: Standard, fall  Orthopedic Precautions: N/A     Subjective:    Patient agreeable to participate in therapy. She has c/o feeling worse today.  She reports feeling weaker.         Objective:        Functional Mobility:  Bed Mobility:        Transfers: sit<>stand with supervision.       Gait: Independent >175' pushing WC         Balance:   Static Sit: GOOD: Takes MODERATE challenges from all directions  Dynamic Sit: GOOD: Maintains balance through MODERATE excursions of active trunk movement  Static Stand: GOOD: Takes MODERATE challenges from all directions  Dynamic stand: GOOD: Needs SUPERVISION only during gait and able to self right with moderate LOB       Therapeutic Activities and Exercises:  Exercises 41841    Heelslide 10'   LAQ    Hip ADD/ABD 5'   Bike 10'   Nustep    sit <>stand x10 to improve endurance.           Activities 82032    Nustep 15'   WC self propulsion    transfer training        Gait See above      O2 administered throughout PT session and tolerates well without sig request of rest breaks.      Patient left up in chair with call button in reach.    Assessment:  Courtney Pickens is a 81 y.o. female with a medical diagnosis of Pneumonia and presents with mild weakness and decreased tolerance to activity.    Rehab identified problem list/impairments: Rehab identified problem list/impairments: weakness,impaired endurance,gait instability,impaired balance    Rehab potential is good.    Activity tolerance: Fair    Discharge recommendations: Discharge Facility/Level of Care Needs: home     Barriers to discharge:       Equipment recommendations: Equipment Needed After Discharge: none     GOALS:   Multidisciplinary Problems     Physical Therapy Goals        Problem: Physical Therapy Goal    Goal Priority Disciplines Outcome Goal Variances Interventions   Physical Therapy Goal     PT, PT/OT Ongoing, Progressing     Description:   Goals     Short Term Goals  2 weeks    1) I HOME EXERCISE PROGRAM  2) I in room transfers  3) Gait 500 feet with no device  and No assistance  4) I Wheel Chair skills    Long Term Goals  4 weeks    1) Discharge to home  2) tolerate 45 mins of activity  3) up/down 10 steps I with rail                      PLAN:    Patient to be seen 5 x/week  to address the above listed problems via    Plan of Care expires: 12/24/21  Plan of Care reviewed with: patient         11/29/2021

## 2021-11-29 NOTE — PT/OT/SLP PROGRESS
Occupational Therapy  Treatment    Courtney Pickens   MRN: [id number]   Admitting Diagnosis: Pneumonia    OT Date of Treatment: 11/29/21   OT Start Time: 1115  OT Stop Time: 1155  OT Total Time (min): 40 min    Billable Minutes:  Therapeutic Activity 31 and Therapeutic Exercise 9    OT/CRISTINA: OT          General Precautions: Standard, fall  Orthopedic Precautions: N/A    Subjective:  Communicated with pt prior to session.  Pt agreeable to tx after completing PT session  Pain/Comfort  Pain Rating 1: 0/10    Objective:  Patient found with: oxygen     Functional Mobility:  Bed Mobility:  Mod i       Transfers:  independent        Functional Ambulation: independent short distances, needs Rollator for >50 feet and gets fatigued and increased fall risk at >100 feet; needs rest break.  Could benefit from continued PT especially for improving ambulatory endurance to decrease fall risk upon d/c to home.    Activities of Daily Living:     Feeding adaptive equipment: independent, no AE     UE adaptive equipment: no AE needed, Independent     LE adaptive equipment: extra time, no AE needed    Bathing adaptive equipment: shower chair and extra time needed    Balance:   Static Sit: NORMAL: No deviations seen in posture held statically  Dynamic Sit: NORMAL: No deviations seen in posture held dynamically  Static Stand: GOOD: Takes MODERATE challenges from all directions  Dynamic stand: GOOD: Needs SUPERVISION only during gait and able to self right with moderate LOB    Therapeutic Activities and Exercises:  Pt completed b pulleys x 8 min; pt had episode of getting overheated and feeling nauseated at this point.  OT retrieved cold wet cloth and assisted pt out of her thick robe.  Pt was able to cool down and continue with therapy session. She then completed clothespin graded task for reach and core stamina training whichshe handled well and without difficulty from seated position.  She then completed green putty terri pinch task x 15 min  "for FM and  strength training before OT assisted her back to her room to bedside for rest and water break before lunch.      AM-PAC 6 CLICK ADL   How much help from another person does this patient currently need?   1 = Unable, Total/Dependent Assistance  2 = A lot, Maximum/Moderate Assistance  3 = A little, Minimum/Contact Guard/Supervision  4 = None, Modified [place]/Independent    Putting on and taking off regular lower body clothing? : 3  Bathing (including washing, rinsing, drying)?: 3  Toileting, which includes using toilet, bedpan, or urinal? : 4  Putting on and taking off regular upper body clothing?: 4  Taking care of personal grooming such as brushing teeth?: 4  Eating meals?: 4  Daily Activity Total Score: 22     AM-PAC Raw Score CMS "G-Code Modifier Level of Impairment Assistance   6 % Total / Unable   7 - 8 CM 80 - 100% Maximal Assist   9-13 CL 60 - 80% Moderate Assist   14 - 19 CK 40 - 60% Moderate Assist   20 - 22 CJ 20 - 40% Minimal Assist   23 CI 1-20% SBA / CGA   24 CH 0% Independent/ Mod I       Patient left EOB seated with call button in reach    ASSESSMENT:  Courtney Pickens is a 81 y.o. female with a medical diagnosis of Pneumonia and presents with deficits in endurance overall.    Rehab identified problem list/impairments: Rehab identified problem list/impairments: impaired endurance,weakness,impaired self care skills    Rehab potential is excellent.    Activity tolerance: Good    Discharge recommendations: Discharge Facility/Level of Care Needs: home with home health     Barriers to discharge: Barriers to Discharge: None    Equipment recommendations: rollator,grab bar     GOALS:   Multidisciplinary Problems     Occupational Therapy Goals        Problem: Occupational Therapy Goal    Goal Priority Disciplines Outcome Interventions   Occupational Therapy Goal     OT, PT/OT Ongoing, Progressing    Description: Goals to be met by: d/c     Patient will increase functional " "independence with ADLs by performing:    LE Dressing with [place]. PROGRESSING  Increased functional strength to WFL for all ADL tasks. PROGRESSING  Upper extremity exercise program x30 reps per handout, with independence completing chair seated exercises.  PROGRESSING    Pt is somewhat hesitant and needs encouragement to participate fully.  Pt states today, "I think I might get to go home tomorrow."  OT advised her we would speak with DO regarding how things are progressing with fluid levels and CHF at this time.  Pt agreeable.                     Plan:  Patient to be seen 5 x/week to address the above listed problems via self-care/home management,community/work re-entry,therapeutic activities,therapeutic exercises,therapeutic groups  Plan of Care expires: 12/03/21 (may need to be revised, as pt is having increased difficulty with retaining fluid)  Plan of Care reviewed with: patient         11/29/2021  "

## 2021-11-29 NOTE — PROGRESS NOTES
[facility] Medicine  Progress Note    Patient Name: Courtney Pickens  MRN: [id number]  Patient Class: IP- Swing   Admission Date: 11/19/2021  Length of Stay: 10 days  Attending Physician: Austin Valencia DO  Primary Care Provider: Primary Doctor No        Subjective:     Principal Problem:Pneumonia        HPI:  80 yr old Female admitted for swing-bed following an admit at [facility] with ICU stay since 11/15 for acute heart failure, hypoxemic resp failure, acute renal failure, hyperkalemia, hyponatremia, lactic acidosis, sepsis and anemia.  She continues to take PO augmentin for pneumonia.  Ms Pickens has collins past medical hx of HTN, hypothyroidism, hyperlipidemia, and restless leg syndrome.  She is admitted for medical management and rehab.        Overview/Hospital Course:  11/24 Feels groggy this am but no SOB.  Wants to go on pass.  No major issues.   11/26 Patient and family stating patient doesn't seem to be improving and peripheral edema increasing. Review of patient chart indicates a 4 lb weight game over past 2 days. Potassium level down to 3.0. Potassium supplement was changed from tablet to liquid because patient states trouble swallowing tablet; however, nurse states patient does not always drink all her potassium supplement.  11/29 Audra RN reports Ms Pickens and her daughter have concerns today.  Ms Pickens was found resting in bed.  She reports of continued swelling in bilateral legs and thinks may be caused by her BP med being changed from irbesartan to amlodipine.  Also c/o feeling more weak every day. Telemed Consult via audio/ video with Dr Buck via audio/ video, she agrees with changing BP med to ARB and request lab today to include BNP, B12, Vitamin D and TSH.        Interval History: 5 days    Review of Systems   Constitutional: Positive for activity change and fatigue. Negative for chills and fever.   HENT: Negative for congestion.    Respiratory: Positive  for cough, shortness of breath and wheezing.    Cardiovascular: Positive for leg swelling. Negative for chest pain.   Genitourinary: Negative.    Musculoskeletal: Positive for myalgias.   Skin: Negative for color change.   Neurological: Negative for dizziness and light-headedness.   Psychiatric/Behavioral: Negative.      Objective:     Vital Signs (Most Recent):  Temp: 98 °F (36.7 °C) (11/29/21 0828)  Pulse: 63 (11/29/21 0828)  Resp: 18 (11/29/21 0828)  BP: (!) 151/54 (11/29/21 0828)  SpO2: 96 % (11/29/21 0828) Vital Signs (24h Range):  Temp:  [97.8 °F (36.6 °C)-98 °F (36.7 °C)] 98 °F (36.7 °C)  Pulse:  [63-84] 63  Resp:  [17-24] 18  SpO2:  [96 %-98 %] 96 %  BP: (130-151)/(51-54) 151/54     Weight: 76.1 kg (167 lb 12.3 oz)  Body mass index is 30.69 kg/m².    Intake/Output Summary (Last 24 hours) at 11/29/2021 1224  Last data filed at 11/28/2021 2229  Gross per 24 hour   Intake 360 ml   Output 150 ml   Net 210 ml      Physical Exam  Vitals and nursing note reviewed.   Constitutional:       General: She is not in acute distress.     Appearance: She is obese. She is ill-appearing.   HENT:      Nose: No congestion.      Mouth/Throat:      Mouth: Mucous membranes are moist.   Cardiovascular:      Rate and Rhythm: Normal rate and regular rhythm.   Pulmonary:      Breath sounds: Wheezing and rhonchi present. No rales.   Abdominal:      Palpations: Abdomen is soft.   Musculoskeletal:      Right lower leg: Edema present.      Left lower leg: Edema present.   Skin:     General: Skin is warm and dry.   Neurological:      Mental Status: She is alert.   Psychiatric:         Mood and Affect: Mood normal.         Significant Labs: All pertinent labs within the past 24 hours have been reviewed.    Significant Imaging: I have reviewed all pertinent imaging results/findings within the past 24 hours.  none      Assessment/Plan:      * Pneumonia  Continue PO per recs from previous hospital.       Hypokalemia  Monitor and replace as  needed.  On daily.       Essential hypertension  Restart on ARB as was on before admit to [facility]  Monitor VS    Chronic congestive heart failure  Daily weights.  Used diuretics as needed.         VTE Risk Mitigation (From admission, onward)         Ordered     enoxaparin injection 40 mg  Daily         11/19/21 1855     IP VTE LOW RISK PATIENT  Once         11/19/21 1549                Discharge Planning   DOREEN:      Code Status: Full Code   Is the patient medically ready for discharge?:     Reason for patient still in hospital (select all that apply): swingbed  Discharge Plan A: Home                  VIOLA Snell  Department of Hospital Medicine   [facility]

## 2021-11-30 PROCEDURE — 25000003 PHARM REV CODE 250: Performed by: HOSPITALIST

## 2021-11-30 PROCEDURE — 99308 SBSQ NF CARE LOW MDM 20: CPT | Mod: ,,, | Performed by: HOSPITALIST

## 2021-11-30 PROCEDURE — 97535 SELF CARE MNGMENT TRAINING: CPT

## 2021-11-30 PROCEDURE — 25000003 PHARM REV CODE 250: Performed by: NURSE PRACTITIONER

## 2021-11-30 PROCEDURE — 99900035 HC TECH TIME PER 15 MIN (STAT)

## 2021-11-30 PROCEDURE — 97530 THERAPEUTIC ACTIVITIES: CPT

## 2021-11-30 PROCEDURE — 11000004 HC SNF PRIVATE

## 2021-11-30 PROCEDURE — 97110 THERAPEUTIC EXERCISES: CPT

## 2021-11-30 PROCEDURE — 94640 AIRWAY INHALATION TREATMENT: CPT

## 2021-11-30 PROCEDURE — 27000958

## 2021-11-30 PROCEDURE — 25000242 PHARM REV CODE 250 ALT 637 W/ HCPCS: Performed by: NURSE PRACTITIONER

## 2021-11-30 PROCEDURE — 99308 PR NURSING FAC CARE, SUBSEQ, MINOR COMPLIC: ICD-10-PCS | Mod: ,,, | Performed by: HOSPITALIST

## 2021-11-30 PROCEDURE — 27000221 HC OXYGEN, UP TO 24 HOURS

## 2021-11-30 PROCEDURE — 63600175 PHARM REV CODE 636 W HCPCS: Performed by: NURSE PRACTITIONER

## 2021-11-30 RX ORDER — FUROSEMIDE 40 MG/1
40 TABLET ORAL 2 TIMES DAILY
Status: DISCONTINUED | OUTPATIENT
Start: 2021-11-30 | End: 2021-12-01

## 2021-11-30 RX ORDER — FUROSEMIDE 40 MG/1
40 TABLET ORAL DAILY
Status: DISCONTINUED | OUTPATIENT
Start: 2021-11-30 | End: 2021-11-30

## 2021-11-30 RX ADMIN — FUROSEMIDE 40 MG: 40 TABLET ORAL at 02:11

## 2021-11-30 RX ADMIN — HYDRALAZINE HYDROCHLORIDE 25 MG: 25 TABLET ORAL at 02:11

## 2021-11-30 RX ADMIN — LOSARTAN POTASSIUM 25 MG: 25 TABLET, FILM COATED ORAL at 08:11

## 2021-11-30 RX ADMIN — ZINC OXIDE TOPICAL OINT.: at 09:11

## 2021-11-30 RX ADMIN — IPRATROPIUM BROMIDE AND ALBUTEROL SULFATE 3 ML: .5; 3 SOLUTION RESPIRATORY (INHALATION) at 07:11

## 2021-11-30 RX ADMIN — HYDRALAZINE HYDROCHLORIDE 25 MG: 25 TABLET ORAL at 08:11

## 2021-11-30 RX ADMIN — FUROSEMIDE 40 MG: 40 TABLET ORAL at 09:11

## 2021-11-30 RX ADMIN — ENOXAPARIN SODIUM 40 MG: 40 INJECTION SUBCUTANEOUS at 04:11

## 2021-11-30 RX ADMIN — ROPINIROLE 2 MG: 1 TABLET, FILM COATED ORAL at 08:11

## 2021-11-30 RX ADMIN — BUDESONIDE 0.5 MG: 0.5 INHALANT ORAL at 07:11

## 2021-11-30 RX ADMIN — SENNOSIDES AND DOCUSATE SODIUM 1 TABLET: 8.6; 5 TABLET ORAL at 07:11

## 2021-11-30 RX ADMIN — THYROID 90 MG: 90 TABLET ORAL at 05:11

## 2021-11-30 RX ADMIN — DIPHENHYDRAMINE HYDROCHLORIDE 25 MG: 25 TABLET ORAL at 08:11

## 2021-11-30 RX ADMIN — ONDANSETRON 4 MG: 4 TABLET, ORALLY DISINTEGRATING ORAL at 07:11

## 2021-11-30 RX ADMIN — MELATONIN 6 MG: 3 TAB ORAL at 08:11

## 2021-11-30 RX ADMIN — VENLAFAXINE HYDROCHLORIDE 150 MG: 75 CAPSULE, EXTENDED RELEASE ORAL at 08:11

## 2021-11-30 RX ADMIN — PANTOPRAZOLE SODIUM 40 MG: 40 TABLET, DELAYED RELEASE ORAL at 08:11

## 2021-11-30 RX ADMIN — IPRATROPIUM BROMIDE AND ALBUTEROL SULFATE 3 ML: .5; 3 SOLUTION RESPIRATORY (INHALATION) at 01:11

## 2021-11-30 RX ADMIN — OXYBUTYNIN CHLORIDE 10 MG: 5 TABLET, EXTENDED RELEASE ORAL at 08:11

## 2021-11-30 RX ADMIN — ESTRADIOL 1 MG: 0.5 TABLET ORAL at 08:11

## 2021-11-30 RX ADMIN — ROPINIROLE 2 MG: 1 TABLET, FILM COATED ORAL at 02:11

## 2021-11-30 RX ADMIN — SENNOSIDES AND DOCUSATE SODIUM 1 TABLET: 8.6; 5 TABLET ORAL at 08:11

## 2021-11-30 RX ADMIN — ACETAMINOPHEN 650 MG: 325 TABLET ORAL at 04:11

## 2021-11-30 NOTE — PT/OT/SLP PROGRESS
Occupational Therapy  Treatment    Courtney Pickens   MRN: [id number]   Admitting Diagnosis: Chronic congestive heart failure    OT Date of Treatment: 11/30/21   OT Start Time: 0745  OT Stop Time: 0815  OT Total Time (min): 30 min    Billable Minutes:  Self Care/Home Management 30    OT/CRISTINA: OT          General Precautions: Standard, fall  Orthopedic Precautions: N/A  Braces:    Respiratory Status:   · O2 Device (Oxygen Therapy): nasal cannula w/ humidification         Subjective:  Communicated with pt and her son -in-law who was present prior to session.  Pt states agreeable to therapy with ADL assessment; soninlaw states they are concerned about her level of fluid retention and her meds since coming from previous hospital.  OT relayed information to pharmacist today who reported back that DO has made changes today to pt medication regimine.       Objective:  Patient found with: oxygen     Functional Mobility:  Bed Mobility: mod i       Transfers: sba today with one LOB as pt was attempting standing portion of ADL LB care        Functional Ambulation: hand held assist, needs walker right now for decreasing fall risk.  PT assessing.    Activities of Daily Living:     Feeding adaptive equipment: none needed     UE adaptive equipment: none needed     LE adaptive equipment: pt initially did not need AE but with increased fluid retention, she is having difficulty reaching her B feet.  Will wait to see how fluid levels decline, then repeat assessment for LB self care.    Bathing adaptive equipment: no assistive device    Balance:   Static Sit: NORMAL: No deviations seen in posture held statically  Dynamic Sit: NORMAL: No deviations seen in posture held dynamically  Static Stand: FAIR: Maintains without assist but unable to take challenges  Dynamic stand: FAIR: Needs CONTACT GUARD during gait     This has declined since pt has increased fluid retention and seems weaker with increased CHF symptoms.    Therapeutic Activities  "and Exercises:  Self care only today, pt very audibly wheezy and making grunting noises with increased overall movement    AM-PAC 6 CLICK ADL   How much help from another person does this patient currently need?   1 = Unable, Total/Dependent Assistance  2 = A lot, Maximum/Moderate Assistance  3 = A little, Minimum/Contact Guard/Supervision  4 = None, Modified [place]/Independent    Putting on and taking off regular lower body clothing? : 3  Bathing (including washing, rinsing, drying)?: 3  Toileting, which includes using toilet, bedpan, or urinal? : 3  Putting on and taking off regular upper body clothing?: 3  Taking care of personal grooming such as brushing teeth?: 4  Eating meals?: 4  Daily Activity Total Score: 20     AM-PAC Raw Score CMS "G-Code Modifier Level of Impairment Assistance   6 % Total / Unable   7 - 8 CM 80 - 100% Maximal Assist   9-13 CL 60 - 80% Moderate Assist   14 - 19 CK 40 - 60% Moderate Assist   20 - 22 CJ 20 - 40% Minimal Assist   23 CI 1-20% SBA / CGA   24 CH 0% Independent/ Mod I       Patient left HOB elevated with family member present notified    ASSESSMENT:  Courtney Pickens is a 81 y.o. female with a medical diagnosis of Chronic congestive heart failure and presents with increased SOB and fluid retention including very puffy B feet and all clothes not fitting well this date.    Rehab identified problem list/impairments: Rehab identified problem list/impairments: impaired endurance,weakness,impaired self care skills    Rehab potential is good.    Activity tolerance: Fair    Discharge recommendations: Discharge Facility/Level of Care Needs: home with home health     Barriers to discharge: Barriers to Discharge: None    Equipment recommendations: rollator,grab bar     GOALS:   Multidisciplinary Problems     Occupational Therapy Goals        Problem: Occupational Therapy Goal    Goal Priority Disciplines Outcome Interventions   Occupational Therapy Goal     OT, PT/OT Ongoing, " "Progressing    Description: Goals to be met by: d/c     Patient will increase functional independence with ADLs by performing:    LE Dressing with [place]. PROGRESSING  Increased functional strength to WFL for all ADL tasks. PROGRESSING  Upper extremity exercise program x30 reps per handout, with independence completing chair seated exercises.  PROGRESSING    Pt is somewhat hesitant and needs encouragement to participate fully.  Pt states today, "I think I might get to go home tomorrow."  OT advised her we would speak with DO regarding how things are progressing with fluid levels and CHF at this time.  Pt agreeable.                     Plan:  Patient to be seen 5 x/week to address the above listed problems via self-care/home management,community/work re-entry,therapeutic activities,therapeutic exercises,therapeutic groups  Plan of Care expires: 12/03/21 (may need to be revised, as pt is having increased difficulty with retaining fluid)  Plan of Care reviewed with: patient         11/30/2021  "

## 2021-11-30 NOTE — PLAN OF CARE
Problem: Adult Inpatient Plan of Care  Goal: Plan of Care Review  Outcome: Ongoing, Progressing  Goal: Patient-Specific Goal (Individualization)  Outcome: Ongoing, Progressing  Goal: Absence of Hospital-Acquired Illness or Injury  Outcome: Ongoing, Progressing  Goal: Optimal Comfort and Wellbeing  Outcome: Ongoing, Progressing  Goal: Readiness for Transition of Care  Outcome: Ongoing, Progressing  Goal: Rounds/Family Conference  Outcome: Ongoing, Progressing     Problem: Infection (Pneumonia)  Goal: Resolution of Infection Signs/Symptoms  Outcome: Ongoing, Progressing     Problem: Fall Injury Risk  Goal: Absence of Fall and Fall-Related Injury  Outcome: Ongoing, Progressing     Problem: Skin Injury Risk Increased  Goal: Skin Health and Integrity  Outcome: Ongoing, Progressing

## 2021-11-30 NOTE — PT/OT/SLP PROGRESS
Physical Therapy  Treatment    Courtney Pickens   MRN: [id number]   Admitting Diagnosis: Chronic congestive heart failure    PT Received On: 11/30/21  PT Start Time: 0900     PT Stop Time: 0930    PT Total Time (min): 30 min       Billable Minutes:  Therapeutic Activity 15 and Therapeutic Exercise 15    Treatment Type: Evaluation,Treatment  PT/PTA: PT     PTA Visit Number: 0       General Precautions: Standard, fall  Orthopedic Precautions: N/A     Subjective:    Patient agreeable to participate in therapy but she reports having a stomach ache.         Objective:        Functional Mobility:  Bed Mobility:  I       Transfers: sit<>stand with supervision.         Balance:   Static Sit: GOOD: Takes MODERATE challenges from all directions  Dynamic Sit: GOOD: Maintains balance through MODERATE excursions of active trunk movement  Static Stand: GOOD: Takes MODERATE challenges from all directions  Dynamic stand: GOOD: Needs SUPERVISION only during gait and able to self right with moderate LOB       Therapeutic Activities and Exercises:  Exercises 66888    Heelslide    LAQ    Hip ADD/ABD    Bike 15'   Nustep    sit <>stand            Activities 96741    Nustep 15'   WC self propulsion    transfer training        Gait See above      O2 administered throughout PT session.  She reported increasing feeling of illness and requested to be returned to her room.  Nursing was notified.    Patient left up in chair with call button in reach.    Assessment:  Courtney Pickens is a 81 y.o. female with a medical diagnosis of Chronic congestive heart failure and presents with mild weakness and decreased tolerance to activity.    Rehab identified problem list/impairments: Rehab identified problem list/impairments: weakness,impaired endurance,gait instability,impaired balance    Rehab potential is good.    Activity tolerance: Fair    Discharge recommendations: Discharge Facility/Level of Care Needs: home     Barriers to discharge:      Equipment  recommendations: Equipment Needed After Discharge: none     GOALS:   Multidisciplinary Problems     Physical Therapy Goals        Problem: Physical Therapy Goal    Goal Priority Disciplines Outcome Goal Variances Interventions   Physical Therapy Goal     PT, PT/OT Ongoing, Progressing     Description:   Goals     Short Term Goals  2 weeks    1) I HOME EXERCISE PROGRAM  2) I in room transfers  3) Gait 500 feet with no device  and No assistance  4) I Wheel Chair skills    Long Term Goals  4 weeks    1) Discharge to home  2) tolerate 45 mins of activity  3) up/down 10 steps I with rail                      PLAN:    Patient to be seen 5 x/week  to address the above listed problems via    Plan of Care expires: 12/24/21  Plan of Care reviewed with: patient         11/30/2021

## 2021-11-30 NOTE — SUBJECTIVE & OBJECTIVE
Interval History: Some increased edema, weigh is up    Review of Systems   Respiratory: Positive for shortness of breath.    Cardiovascular: Positive for leg swelling. Negative for chest pain.   Gastrointestinal: Negative for abdominal pain, nausea and vomiting.   Psychiatric/Behavioral: Negative for agitation and confusion.   All other systems reviewed and are negative.    Objective:     Vital Signs (Most Recent):  Temp: 97.5 °F (36.4 °C) (11/30/21 0702)  Pulse: 60 (11/30/21 0715)  Resp: (!) 24 (11/30/21 0715)  BP: (!) 149/50 (11/30/21 0702)  SpO2: 99 % (11/30/21 0715) Vital Signs (24h Range):  Temp:  [96.8 °F (36 °C)-97.5 °F (36.4 °C)] 97.5 °F (36.4 °C)  Pulse:  [58-78] 60  Resp:  [18-24] 24  SpO2:  [95 %-99 %] 99 %  BP: (146-149)/(50-65) 149/50     Weight: 77.4 kg (170 lb 10.2 oz)  Body mass index is 31.21 kg/m².    Intake/Output Summary (Last 24 hours) at 11/30/2021 1028  Last data filed at 11/30/2021 0537  Gross per 24 hour   Intake 860 ml   Output 200 ml   Net 660 ml      Physical Exam  Vitals and nursing note reviewed.   Constitutional:       General: She is not in acute distress.     Appearance: She is obese.   HENT:      Nose: No congestion.      Mouth/Throat:      Mouth: Mucous membranes are moist.   Eyes:      Extraocular Movements: Extraocular movements intact.      Conjunctiva/sclera: Conjunctivae normal.      Pupils: Pupils are equal, round, and reactive to light.   Cardiovascular:      Rate and Rhythm: Normal rate and regular rhythm.   Pulmonary:      Effort: Pulmonary effort is normal. No respiratory distress.      Breath sounds: No rales.      Comments: Faint exp wheeze at times but no rales  Abdominal:      General: Abdomen is flat. Bowel sounds are normal. There is no distension.      Palpations: Abdomen is soft.      Tenderness: There is no abdominal tenderness.   Musculoskeletal:      Right lower leg: Edema present.      Left lower leg: Edema present.   Skin:     General: Skin is warm and dry.    Neurological:      General: No focal deficit present.      Mental Status: She is alert.   Psychiatric:         Mood and Affect: Mood normal.         Thought Content: Thought content normal.         Significant Labs:   All pertinent labs within the past 24 hours have been reviewed.  Recent Lab Results       11/29/21  1140        TSH 11.972       Vit D, 25-Hydroxy 27.4  Comment: Vitamin D 25-OH Adult Reference Values:  Deficiency: <20 ng/mL  Insufficiency: 20 - <30 ng/mL  Sufficiency: 30 -100 ng/mL    Vitamin D 25-OH Pediatric Reference Values:  Deficiency: <15 ng/mL  Insufficiency: 15 - <20 ng/mL  Sufficiency: 20 - 100 ng/mL       Vitamin B-12 1,127             Significant Imaging: I have reviewed all pertinent imaging results/findings within the past 24 hours.

## 2021-11-30 NOTE — PROGRESS NOTES
[facility] Medicine  Progress Note    Patient Name: Courtney Pickens  MRN: [id number]  Patient Class: IP- Swing   Admission Date: 11/19/2021  Length of Stay: 11 days  Attending Physician: Austin Valencia DO  Primary Care Provider: Primary Doctor No        Subjective:     Principal Problem:Chronic congestive heart failure        HPI:  80 yr old Female admitted for swing-bed following an admit at [facility] with ICU stay since 11/15 for acute heart failure, hypoxemic resp failure, acute renal failure, hyperkalemia, hyponatremia, lactic acidosis, sepsis and anemia.  She continues to take PO augmentin for pneumonia.  Ms Pickens has collins past medical hx of HTN, hypothyroidism, hyperlipidemia, and restless leg syndrome.  She is admitted for medical management and rehab.        Overview/Hospital Course:  11/24 Feels groggy this am but no SOB.  Wants to go on pass.  No major issues.   11/26 Patient and family stating patient doesn't seem to be improving and peripheral edema increasing. Review of patient chart indicates a 4 lb weight game over past 2 days. Potassium level down to 3.0. Potassium supplement was changed from tablet to liquid because patient states trouble swallowing tablet; however, nurse states patient does not always drink all her potassium supplement.  11/29 Audra RN reports Ms Pickens and her daughter have concerns today.  Ms Pickens was found resting in bed.  She reports of continued swelling in bilateral legs and thinks may be caused by her BP med being changed from irbesartan to amlodipine.  Also c/o feeling more weak every day. Telemed Consult via audio/ video with Dr Buck via audio/ video, she agrees with changing BP med to ARB and request lab today to include BNP, B12, Vitamin D and TSH.    11/30 asked to see patient for increased edema, inc SOB and O2 use      Interval History: Some increased edema, weigh is up    Review of Systems   Respiratory: Positive for  shortness of breath.    Cardiovascular: Positive for leg swelling. Negative for chest pain.   Gastrointestinal: Negative for abdominal pain, nausea and vomiting.   Psychiatric/Behavioral: Negative for agitation and confusion.   All other systems reviewed and are negative.    Objective:     Vital Signs (Most Recent):  Temp: 97.5 °F (36.4 °C) (11/30/21 0702)  Pulse: 60 (11/30/21 0715)  Resp: (!) 24 (11/30/21 0715)  BP: (!) 149/50 (11/30/21 0702)  SpO2: 99 % (11/30/21 0715) Vital Signs (24h Range):  Temp:  [96.8 °F (36 °C)-97.5 °F (36.4 °C)] 97.5 °F (36.4 °C)  Pulse:  [58-78] 60  Resp:  [18-24] 24  SpO2:  [95 %-99 %] 99 %  BP: (146-149)/(50-65) 149/50     Weight: 77.4 kg (170 lb 10.2 oz)  Body mass index is 31.21 kg/m².    Intake/Output Summary (Last 24 hours) at 11/30/2021 1028  Last data filed at 11/30/2021 0537  Gross per 24 hour   Intake 860 ml   Output 200 ml   Net 660 ml      Physical Exam  Vitals and nursing note reviewed.   Constitutional:       General: She is not in acute distress.     Appearance: She is obese.   HENT:      Nose: No congestion.      Mouth/Throat:      Mouth: Mucous membranes are moist.   Eyes:      Extraocular Movements: Extraocular movements intact.      Conjunctiva/sclera: Conjunctivae normal.      Pupils: Pupils are equal, round, and reactive to light.   Cardiovascular:      Rate and Rhythm: Normal rate and regular rhythm.   Pulmonary:      Effort: Pulmonary effort is normal. No respiratory distress.      Breath sounds: No rales.      Comments: Faint exp wheeze at times but no rales  Abdominal:      General: Abdomen is flat. Bowel sounds are normal. There is no distension.      Palpations: Abdomen is soft.      Tenderness: There is no abdominal tenderness.   Musculoskeletal:      Right lower leg: Edema present.      Left lower leg: Edema present.   Skin:     General: Skin is warm and dry.   Neurological:      General: No focal deficit present.      Mental Status: She is alert.    Psychiatric:         Mood and Affect: Mood normal.         Thought Content: Thought content normal.         Significant Labs:   All pertinent labs within the past 24 hours have been reviewed.  Recent Lab Results       11/29/21  1140        TSH 11.972       Vit D, 25-Hydroxy 27.4  Comment: Vitamin D 25-OH Adult Reference Values:  Deficiency: <20 ng/mL  Insufficiency: 20 - <30 ng/mL  Sufficiency: 30 -100 ng/mL    Vitamin D 25-OH Pediatric Reference Values:  Deficiency: <15 ng/mL  Insufficiency: 15 - <20 ng/mL  Sufficiency: 20 - 100 ng/mL       Vitamin B-12 1,127             Significant Imaging: I have reviewed all pertinent imaging results/findings within the past 24 hours.      Assessment/Plan:      * Chronic congestive heart failure  Lasix now BID and monitor daily weights, change to low Na with fluid restriction.       Hypokalemia  Monitor and replace as needed.  On daily.       Essential hypertension  Restart on ARB as was on before admit to [facility]  Monitor VS    Pneumonia  Seems resolved.  Dealing with fluid now.       VTE Risk Mitigation (From admission, onward)         Ordered     enoxaparin injection 40 mg  Daily         11/19/21 1855     IP VTE LOW RISK PATIENT  Once         11/19/21 1549                Discharge Planning   DOREEN:      Code Status: Full Code   Is the patient medically ready for discharge?:     Reason for patient still in hospital (select all that apply): Patient trending condition, Laboratory test and PT / OT recommendations  Discharge Plan A: Home                  Austin Valencia DO  Department of Hospital Medicine   [facility] [place]

## 2021-11-30 NOTE — NURSING
Pt has rested well this shift after Tylenol and Benadryl but noticed increased dyspnea with any exertion. Increased swelling noted to lower ext. Pt frequently takes oxygen off and get SOB while talking/eating. Family wants to see MD today.

## 2021-12-01 PROBLEM — E87.1 HYPONATREMIA: Status: ACTIVE | Noted: 2021-12-01

## 2021-12-01 LAB
ANION GAP SERPL CALCULATED.3IONS-SCNC: 14 MMOL/L (ref 7–16)
BUN SERPL-MCNC: 18 MG/DL (ref 7–18)
BUN/CREAT SERPL: 12 (ref 6–20)
CALCIUM SERPL-MCNC: 8.8 MG/DL (ref 8.5–10.1)
CHLORIDE SERPL-SCNC: 84 MMOL/L (ref 98–107)
CO2 SERPL-SCNC: 23 MMOL/L (ref 21–32)
CREAT SERPL-MCNC: 1.51 MG/DL (ref 0.55–1.02)
GLUCOSE SERPL-MCNC: 85 MG/DL (ref 74–106)
POTASSIUM SERPL-SCNC: 4.2 MMOL/L (ref 3.5–5.1)
SODIUM SERPL-SCNC: 117 MMOL/L (ref 136–145)

## 2021-12-01 PROCEDURE — 97530 THERAPEUTIC ACTIVITIES: CPT

## 2021-12-01 PROCEDURE — 97110 THERAPEUTIC EXERCISES: CPT | Mod: CQ

## 2021-12-01 PROCEDURE — 94761 N-INVAS EAR/PLS OXIMETRY MLT: CPT

## 2021-12-01 PROCEDURE — 25000242 PHARM REV CODE 250 ALT 637 W/ HCPCS: Performed by: NURSE PRACTITIONER

## 2021-12-01 PROCEDURE — 99309 PR NURSING FAC CARE, SUBSEQ, SIGNIF COMPLIC: ICD-10-PCS | Mod: ,,, | Performed by: HOSPITALIST

## 2021-12-01 PROCEDURE — 80048 BASIC METABOLIC PNL TOTAL CA: CPT | Performed by: HOSPITALIST

## 2021-12-01 PROCEDURE — 11000004 HC SNF PRIVATE

## 2021-12-01 PROCEDURE — 99900035 HC TECH TIME PER 15 MIN (STAT)

## 2021-12-01 PROCEDURE — 27000221 HC OXYGEN, UP TO 24 HOURS

## 2021-12-01 PROCEDURE — 36415 COLL VENOUS BLD VENIPUNCTURE: CPT | Performed by: HOSPITALIST

## 2021-12-01 PROCEDURE — 63600175 PHARM REV CODE 636 W HCPCS: Performed by: HOSPITALIST

## 2021-12-01 PROCEDURE — 27000958

## 2021-12-01 PROCEDURE — 94640 AIRWAY INHALATION TREATMENT: CPT

## 2021-12-01 PROCEDURE — 25000003 PHARM REV CODE 250: Performed by: NURSE PRACTITIONER

## 2021-12-01 PROCEDURE — 25000003 PHARM REV CODE 250: Performed by: HOSPITALIST

## 2021-12-01 PROCEDURE — 97530 THERAPEUTIC ACTIVITIES: CPT | Mod: CQ

## 2021-12-01 PROCEDURE — 97110 THERAPEUTIC EXERCISES: CPT

## 2021-12-01 PROCEDURE — 99309 SBSQ NF CARE MODERATE MDM 30: CPT | Mod: ,,, | Performed by: HOSPITALIST

## 2021-12-01 RX ORDER — ENOXAPARIN SODIUM 100 MG/ML
30 INJECTION SUBCUTANEOUS EVERY 24 HOURS
Status: DISCONTINUED | OUTPATIENT
Start: 2021-12-01 | End: 2021-12-06

## 2021-12-01 RX ADMIN — ACETAMINOPHEN 650 MG: 325 TABLET ORAL at 08:12

## 2021-12-01 RX ADMIN — VENLAFAXINE HYDROCHLORIDE 150 MG: 75 CAPSULE, EXTENDED RELEASE ORAL at 08:12

## 2021-12-01 RX ADMIN — HYDRALAZINE HYDROCHLORIDE 25 MG: 25 TABLET ORAL at 08:12

## 2021-12-01 RX ADMIN — ROPINIROLE 2 MG: 1 TABLET, FILM COATED ORAL at 02:12

## 2021-12-01 RX ADMIN — HYDRALAZINE HYDROCHLORIDE 25 MG: 25 TABLET ORAL at 02:12

## 2021-12-01 RX ADMIN — IPRATROPIUM BROMIDE AND ALBUTEROL SULFATE 3 ML: .5; 3 SOLUTION RESPIRATORY (INHALATION) at 08:12

## 2021-12-01 RX ADMIN — FUROSEMIDE 40 MG: 40 TABLET ORAL at 02:12

## 2021-12-01 RX ADMIN — IPRATROPIUM BROMIDE AND ALBUTEROL SULFATE 3 ML: .5; 3 SOLUTION RESPIRATORY (INHALATION) at 07:12

## 2021-12-01 RX ADMIN — IPRATROPIUM BROMIDE AND ALBUTEROL SULFATE 3 ML: .5; 3 SOLUTION RESPIRATORY (INHALATION) at 01:12

## 2021-12-01 RX ADMIN — ROPINIROLE 2 MG: 1 TABLET, FILM COATED ORAL at 08:12

## 2021-12-01 RX ADMIN — PANTOPRAZOLE SODIUM 40 MG: 40 TABLET, DELAYED RELEASE ORAL at 08:12

## 2021-12-01 RX ADMIN — FUROSEMIDE 40 MG: 40 TABLET ORAL at 08:12

## 2021-12-01 RX ADMIN — BUDESONIDE 0.5 MG: 0.5 INHALANT ORAL at 08:12

## 2021-12-01 RX ADMIN — MELATONIN 6 MG: 3 TAB ORAL at 08:12

## 2021-12-01 RX ADMIN — OXYBUTYNIN CHLORIDE 10 MG: 5 TABLET, EXTENDED RELEASE ORAL at 08:12

## 2021-12-01 RX ADMIN — LOSARTAN POTASSIUM 25 MG: 25 TABLET, FILM COATED ORAL at 08:12

## 2021-12-01 RX ADMIN — DIPHENHYDRAMINE HYDROCHLORIDE 25 MG: 25 TABLET ORAL at 08:12

## 2021-12-01 RX ADMIN — ENOXAPARIN SODIUM 30 MG: 30 INJECTION SUBCUTANEOUS at 08:12

## 2021-12-01 RX ADMIN — THYROID 90 MG: 90 TABLET ORAL at 06:12

## 2021-12-01 RX ADMIN — THYROID, PORCINE 15 MG: 15 TABLET ORAL at 06:12

## 2021-12-01 RX ADMIN — SENNOSIDES AND DOCUSATE SODIUM 1 TABLET: 8.6; 5 TABLET ORAL at 08:12

## 2021-12-01 RX ADMIN — ESTRADIOL 1 MG: 0.5 TABLET ORAL at 08:12

## 2021-12-01 NOTE — PLAN OF CARE
Problem: Adult Inpatient Plan of Care  Goal: Plan of Care Review  Outcome: Ongoing, Progressing  Goal: Patient-Specific Goal (Individualization)  Outcome: Ongoing, Progressing  Goal: Absence of Hospital-Acquired Illness or Injury  Outcome: Ongoing, Progressing  Goal: Optimal Comfort and Wellbeing  Outcome: Ongoing, Progressing  Goal: Readiness for Transition of Care  Outcome: Ongoing, Progressing  Goal: Rounds/Family Conference  Outcome: Ongoing, Progressing     Problem: Infection (Pneumonia)  Goal: Resolution of Infection Signs/Symptoms  Outcome: Ongoing, Progressing     Problem: Fall Injury Risk  Goal: Absence of Fall and Fall-Related Injury  Outcome: Ongoing, Progressing

## 2021-12-01 NOTE — SUBJECTIVE & OBJECTIVE
Interval History: Still some swelling, started fluid restriction parameters.     Review of Systems   Cardiovascular: Positive for leg swelling. Negative for chest pain.   Gastrointestinal: Negative for abdominal pain, nausea and vomiting.   Psychiatric/Behavioral: Negative for agitation and confusion.   All other systems reviewed and are negative.    Objective:     Vital Signs (Most Recent):  Temp: 97.9 °F (36.6 °C) (12/01/21 0702)  Pulse: (!) 57 (12/01/21 1337)  Resp: (!) 22 (12/01/21 1337)  BP: (!) 136/48 (12/01/21 0850)  SpO2: 95 % (12/01/21 1337) Vital Signs (24h Range):  Temp:  [97.9 °F (36.6 °C)-98.2 °F (36.8 °C)] 97.9 °F (36.6 °C)  Pulse:  [55-68] 57  Resp:  [20-22] 22  SpO2:  [95 %-99 %] 95 %  BP: (128-136)/(48-69) 136/48     Weight: 80.7 kg (177 lb 14.6 oz)  Body mass index is 32.54 kg/m².    Intake/Output Summary (Last 24 hours) at 12/1/2021 1547  Last data filed at 12/1/2021 1434  Gross per 24 hour   Intake 1250 ml   Output 100 ml   Net 1150 ml      Physical Exam  Vitals and nursing note reviewed.   Constitutional:       General: She is not in acute distress.     Appearance: She is obese.   HENT:      Nose: No congestion.      Mouth/Throat:      Mouth: Mucous membranes are moist.   Eyes:      Extraocular Movements: Extraocular movements intact.      Conjunctiva/sclera: Conjunctivae normal.      Pupils: Pupils are equal, round, and reactive to light.   Cardiovascular:      Rate and Rhythm: Normal rate and regular rhythm.   Pulmonary:      Effort: Pulmonary effort is normal. No respiratory distress.      Breath sounds: No rales.      Comments: Faint exp wheeze at times but no rales  Abdominal:      General: Abdomen is flat. Bowel sounds are normal. There is no distension.      Palpations: Abdomen is soft.      Tenderness: There is no abdominal tenderness.   Musculoskeletal:      Right lower leg: Edema present.      Left lower leg: Edema present.   Skin:     General: Skin is warm and dry.   Neurological:       General: No focal deficit present.      Mental Status: She is alert.   Psychiatric:         Mood and Affect: Mood normal.         Thought Content: Thought content normal.         Significant Labs:   All pertinent labs within the past 24 hours have been reviewed.  Recent Lab Results       12/01/21  0727        Anion Gap 14       BUN 18       BUN/CREAT RATIO 12       Calcium 8.8       Chloride 84       CO2 23       Creatinine 1.51       eGFR if non African American 35       Glucose 85       Potassium 4.2       Sodium 117             Significant Imaging: I have reviewed all pertinent imaging results/findings within the past 24 hours.

## 2021-12-01 NOTE — ASSESSMENT & PLAN NOTE
Redraw labs.  Could be lasix related.  May need to change to Aldactone.      Spoke to pt   Aware of results   Made followup apt in april

## 2021-12-01 NOTE — SIGNIFICANT EVENT
About 1605,  We were sitting at the  Nurse's desk and we heard a thump ( sounded like some hitting the door) so we rush into the room and the to find the patient sitting on the floor, states she were putting some socks in the closet and missed the wheel chair and fell ( unwitnessed) fall. Denies and injury, no cuts or lacerations noted. Vs 98.8-68-/60-95%, pt stated that her head hurts and her left hip, At 1607,  Cheryl Figueroa NP notified and came and assess the patient and new orders were written, the Cousin came up to visit at 1630 and was informed of what happened, Kristen( the daugther) was called at 1645  An notified what had happened and the actions that the NP was taking with pt. She verbalized understanding.Will continue to monitor pt.

## 2021-12-01 NOTE — PT/OT/SLP PROGRESS
"Occupational Therapy  Treatment    Courtney Pickens   MRN: [id number]   Admitting Diagnosis: Chronic congestive heart failure    OT Date of Treatment: 12/01/21   OT Start Time: 1045  OT Stop Time: 1120  OT Total Time (min): 35 min    Billable Minutes:  Therapeutic Activity 15 and Therapeutic Exercise 20    OT/CRISTINA: OT          General Precautions: Standard, fall  Orthopedic Precautions: N/A    Subjective:  Pt reports, "I feel better today."       Objective:        Functional Mobility:  Bed Mobility: mod i       Transfers: none attempted in OT today    Functional Ambulation: hand held assist, needs walker right now for decreasing fall risk.  PT assessing.    Activities of Daily Living:     Feeding adaptive equipment: none needed     UE adaptive equipment: none needed     LE adaptive equipment: pt initially did not need AE but with increased fluid retention, she is having difficulty reaching her B feet.  Will wait to see how fluid levels decline, then repeat assessment for LB self care.    Bathing adaptive equipment: no assistive device    Balance:   Static Sit: NORMAL: No deviations seen in posture held statically  Dynamic Sit: NORMAL: No deviations seen in posture held dynamically  Static Stand: FAIR: Maintains without assist but unable to take challenges  Dynamic stand: FAIR: Needs CONTACT GUARD during gait     This has declined since pt has increased fluid retention and seems weaker with increased CHF symptoms.    Therapeutic Activities and Exercises:  Pt completed tabletop towel push x 5 min x 2 sets with 5# resistance; then, she completed clothespin tree reach and place today without added weights.  After this, pt states, "Oh can't we just call it done today?"  OT assisted pt back to her room for handwashing and preparation for lunch.    AM-PAC 6 CLICK ADL   How much help from another person does this patient currently need?   1 = Unable, Total/Dependent Assistance  2 = A lot, Maximum/Moderate Assistance  3 = A " "little, Minimum/Contact Guard/Supervision  4 = None, Modified [place]/Independent          AM-PAC Raw Score CMS "G-Code Modifier Level of Impairment Assistance   6 % Total / Unable   7 - 8 CM 80 - 100% Maximal Assist   9-13 CL 60 - 80% Moderate Assist   14 - 19 CK 40 - 60% Moderate Assist   20 - 22 CJ 20 - 40% Minimal Assist   23 CI 1-20% SBA / CGA   24 CH 0% Independent/ Mod I       Patient left up in chair with instructions and encouragement to pt to remain in upright seating for lunch time before returning to bed.  She was agreeable.    ASSESSMENT:  Courtney Pickens is a 81 y.o. female with a medical diagnosis of Chronic congestive heart failure and presents with increased SOB and fluid retention including very puffy B feet and pt having edema around her face/chin area.  Rehab identified problem list/impairments: Rehab identified problem list/impairments: impaired endurance,weakness,impaired self care skills    Rehab potential is good.    Activity tolerance: Fair    Discharge recommendations: Discharge Facility/Level of Care Needs: home with home health     Barriers to discharge: Barriers to Discharge: None    Equipment recommendations: rollator,grab bar     GOALS:   Multidisciplinary Problems     Occupational Therapy Goals        Problem: Occupational Therapy Goal    Goal Priority Disciplines Outcome Interventions   Occupational Therapy Goal     OT, PT/OT Ongoing, Progressing    Description: Goals to be met by: d/c     Patient will increase functional independence with ADLs by performing:    LE Dressing with [place]. PROGRESSING  Increased functional strength to WFL for all ADL tasks. PROGRESSING  Upper extremity exercise program x30 reps per handout, with independence completing chair seated exercises.  PROGRESSING    Pt is somewhat hesitant and needs encouragement to participate fully.  Pt states today, "I think I might get to go home tomorrow."  OT advised her we would speak with DO regarding " how things are progressing with fluid levels and CHF at this time.  Pt agreeable.                     Plan:  Patient to be seen 5 x/week to address the above listed problems via self-care/home management,community/work re-entry,therapeutic activities,therapeutic exercises,therapeutic groups  Plan of Care expires: 12/03/21 (may need to be revised, as pt is having increased difficulty with retaining fluid)  Plan of Care reviewed with: patient         12/01/2021

## 2021-12-01 NOTE — PT/OT/SLP PROGRESS
Physical Therapy  Treatment    Courtney Pickens   MRN: [id number]   Admitting Diagnosis: Chronic congestive heart failure    PT Received On: 12/01/21  PT Start Time: 1215     PT Stop Time: 1300    PT Total Time (min): 45 min       Billable Minutes:  Therapeutic Activity 15 and Therapeutic Exercise 15       PT/PTA: PTA     PTA Visit Number: 1       General Precautions: Standard, fall  Orthopedic Precautions: N/A     Subjective:    Patient agreeable to participate in therapy and without sig c/o.         Objective:   Patient found with: oxygen    Functional Mobility:  Bed Mobility:  I       Transfers: sit<>stand with supervision.         Balance:   Static Sit: GOOD: Takes MODERATE challenges from all directions  Dynamic Sit: GOOD: Maintains balance through MODERATE excursions of active trunk movement  Static Stand: GOOD: Takes MODERATE challenges from all directions  Dynamic stand: GOOD: Needs SUPERVISION only during gait and able to self right with moderate LOB       Therapeutic Activities and Exercises:  Exercises 86023    Heelslide 5'   LAQ 2x20   Hip ADD/ABD 2x20   Bike 15'   Nustep    sit <>stand            Activities 73191    Nustep 15'   WC self propulsion    transfer training        Gait      O2 administered throughout PT session.        Patient left up in chair with call button in reach.    Assessment:  Courtney Pickens is a 81 y.o. female with a medical diagnosis of Chronic congestive heart failure and presents with mild weakness and decreased tolerance to activity.    Rehab identified problem list/impairments:      Rehab potential is good.    Activity tolerance: Fair    Discharge recommendations:       Barriers to discharge:      Equipment recommendations:       GOALS:   Multidisciplinary Problems     Physical Therapy Goals        Problem: Physical Therapy Goal    Goal Priority Disciplines Outcome Goal Variances Interventions   Physical Therapy Goal     PT, PT/OT Ongoing, Progressing     Description:   Goals      Short Term Goals  2 weeks    1) I HOME EXERCISE PROGRAM  2) I in room transfers  3) Gait 500 feet with no device  and No assistance  4) I Wheel Chair skills    Long Term Goals  4 weeks    1) Discharge to home  2) tolerate 45 mins of activity  3) up/down 10 steps I with rail                      PLAN:    Patient to be seen 5 x/week  to address the above listed problems via    Plan of Care expires: 12/24/21  Plan of Care reviewed with: patient         12/01/2021

## 2021-12-01 NOTE — PROGRESS NOTES
I was called to the floor to round on the patient after a fall.  Patient reports she was trying to get into her wheelchair when she fell.  Patient denies loss of consciousness.  Fall was not witnessed.  Patient reports she hit the back of her head on a door.  Reports headache.  Patient has small contusion to the occipital scalp.  Patient has mild upper cervical spine tenderness.  Will place patient in C-collar and get stat CT head CT cervical spine without contrast.

## 2021-12-01 NOTE — PROGRESS NOTES
[facility] Medicine  Progress Note    Patient Name: Courtney Pickens  MRN: [id number]  Patient Class: IP- Swing   Admission Date: 11/19/2021  Length of Stay: 12 days  Attending Physician: Austin Valencia DO  Primary Care Provider: Primary Doctor No        Subjective:     Principal Problem:Chronic congestive heart failure    [facility] Admission Certification    I certify that skilled nursing facility services are required to be given on an inpatient basis due to the following required skilled nursing and/or skilled rehabilitation services on a continuing basis:    management & evaluation of a patient plan of care that requires skilled nursing or rehabilitation services    I estimate that the additional period of SNF inpatient care will be 7-14 days.    Plans for post SNF care are: Home Care  ______________________________________________________________________        HPI:  80 yr old Female admitted for swing-bed following an admit at [facility] with ICU stay since 11/15 for acute heart failure, hypoxemic resp failure, acute renal failure, hyperkalemia, hyponatremia, lactic acidosis, sepsis and anemia.  She continues to take PO augmentin for pneumonia.  Ms Pickens has collins past medical hx of HTN, hypothyroidism, hyperlipidemia, and restless leg syndrome.  She is admitted for medical management and rehab.        Overview/Hospital Course:  11/24 Feels groggy this am but no SOB.  Wants to go on pass.  No major issues.   11/26 Patient and family stating patient doesn't seem to be improving and peripheral edema increasing. Review of patient chart indicates a 4 lb weight game over past 2 days. Potassium level down to 3.0. Potassium supplement was changed from tablet to liquid because patient states trouble swallowing tablet; however, nurse states patient does not always drink all her potassium supplement.  11/29 Audra HER reports Ms Pickens and her  daughter have concerns today.  Ms Nelia was found resting in bed.  She reports of continued swelling in bilateral legs and thinks may be caused by her BP med being changed from irbesartan to amlodipine.  Also c/o feeling more weak every day. Telemed Consult via audio/ video with Dr Buck via audio/ video, she agrees with changing BP med to ARB and request lab today to include BNP, B12, Vitamin D and TSH.    11/30 asked to see patient for increased edema, inc SOB and O2 use  12/1 States she doesn't feel good, but no other details.  Weight about the same.  Good diuresis overnight.       Interval History: Still some swelling, started fluid restriction parameters.     Review of Systems   Cardiovascular: Positive for leg swelling. Negative for chest pain.   Gastrointestinal: Negative for abdominal pain, nausea and vomiting.   Psychiatric/Behavioral: Negative for agitation and confusion.   All other systems reviewed and are negative.    Objective:     Vital Signs (Most Recent):  Temp: 97.9 °F (36.6 °C) (12/01/21 0702)  Pulse: (!) 57 (12/01/21 1337)  Resp: (!) 22 (12/01/21 1337)  BP: (!) 136/48 (12/01/21 0850)  SpO2: 95 % (12/01/21 1337) Vital Signs (24h Range):  Temp:  [97.9 °F (36.6 °C)-98.2 °F (36.8 °C)] 97.9 °F (36.6 °C)  Pulse:  [55-68] 57  Resp:  [20-22] 22  SpO2:  [95 %-99 %] 95 %  BP: (128-136)/(48-69) 136/48     Weight: 80.7 kg (177 lb 14.6 oz)  Body mass index is 32.54 kg/m².    Intake/Output Summary (Last 24 hours) at 12/1/2021 1547  Last data filed at 12/1/2021 1434  Gross per 24 hour   Intake 1250 ml   Output 100 ml   Net 1150 ml      Physical Exam  Vitals and nursing note reviewed.   Constitutional:       General: She is not in acute distress.     Appearance: She is obese.   HENT:      Nose: No congestion.      Mouth/Throat:      Mouth: Mucous membranes are moist.   Eyes:      Extraocular Movements: Extraocular movements intact.      Conjunctiva/sclera: Conjunctivae normal.      Pupils: Pupils are equal,  round, and reactive to light.   Cardiovascular:      Rate and Rhythm: Normal rate and regular rhythm.   Pulmonary:      Effort: Pulmonary effort is normal. No respiratory distress.      Breath sounds: No rales.      Comments: Faint exp wheeze at times but no rales  Abdominal:      General: Abdomen is flat. Bowel sounds are normal. There is no distension.      Palpations: Abdomen is soft.      Tenderness: There is no abdominal tenderness.   Musculoskeletal:      Right lower leg: Edema present.      Left lower leg: Edema present.   Skin:     General: Skin is warm and dry.   Neurological:      General: No focal deficit present.      Mental Status: She is alert.   Psychiatric:         Mood and Affect: Mood normal.         Thought Content: Thought content normal.         Significant Labs:   All pertinent labs within the past 24 hours have been reviewed.  Recent Lab Results       12/01/21  0727        Anion Gap 14       BUN 18       BUN/CREAT RATIO 12       Calcium 8.8       Chloride 84       CO2 23       Creatinine 1.51       eGFR if non African American 35       Glucose 85       Potassium 4.2       Sodium 117             Significant Imaging: I have reviewed all pertinent imaging results/findings within the past 24 hours.      Assessment/Plan:      * Chronic congestive heart failure  Lasix now BID and monitor daily weights, change to low Na with fluid restriction.       Hyponatremia  Redraw labs.  Could be lasix related.  May need to change to Aldactone.       Hypokalemia  Monitor and replace as needed.  On daily.       Essential hypertension  Restart on ARB as was on before admit to [facility]  Monitor VS    Pneumonia  Seems resolved.  Dealing with fluid now.       VTE Risk Mitigation (From admission, onward)         Ordered     enoxaparin injection 30 mg  Daily         12/01/21 1359     IP VTE LOW RISK PATIENT  Once         11/19/21 1549                Discharge Planning   DOREEN:      Code Status: Full Code   Is the  patient medically ready for discharge?:     Reason for patient still in hospital (select all that apply): Patient trending condition, Laboratory test and PT / OT recommendations  Discharge Plan A: Home                  Austin Valencia DO  Department of Hospital Medicine   [facility]

## 2021-12-02 PROBLEM — I27.20 PULMONARY HTN: Status: ACTIVE | Noted: 2021-12-02

## 2021-12-02 PROBLEM — I50.812 CHRONIC RIGHT-SIDED HEART FAILURE: Status: ACTIVE | Noted: 2021-12-02

## 2021-12-02 PROBLEM — I50.9 CHRONIC CONGESTIVE HEART FAILURE: Status: RESOLVED | Noted: 2021-11-19 | Resolved: 2021-12-02

## 2021-12-02 LAB
ANION GAP SERPL CALCULATED.3IONS-SCNC: 15 MMOL/L (ref 7–16)
ANION GAP SERPL CALCULATED.3IONS-SCNC: 15 MMOL/L (ref 7–16)
BACTERIA #/AREA URNS HPF: ABNORMAL /HPF
BILIRUB UR QL STRIP: NEGATIVE
BUN SERPL-MCNC: 20 MG/DL (ref 7–18)
BUN SERPL-MCNC: 21 MG/DL (ref 7–18)
BUN/CREAT SERPL: 14 (ref 6–20)
BUN/CREAT SERPL: 15 (ref 6–20)
CALCIUM SERPL-MCNC: 8.5 MG/DL (ref 8.5–10.1)
CALCIUM SERPL-MCNC: 9 MG/DL (ref 8.5–10.1)
CHLORIDE SERPL-SCNC: 80 MMOL/L (ref 98–107)
CHLORIDE SERPL-SCNC: 81 MMOL/L (ref 98–107)
CLARITY UR: CLEAR
CO2 SERPL-SCNC: 19 MMOL/L (ref 21–32)
CO2 SERPL-SCNC: 22 MMOL/L (ref 21–32)
COLOR UR: YELLOW
CREAT SERPL-MCNC: 1.42 MG/DL (ref 0.55–1.02)
CREAT SERPL-MCNC: 1.46 MG/DL (ref 0.55–1.02)
GLUCOSE SERPL-MCNC: 100 MG/DL (ref 74–106)
GLUCOSE SERPL-MCNC: 98 MG/DL (ref 74–106)
GLUCOSE UR STRIP-MCNC: NEGATIVE MG/DL
KETONES UR STRIP-SCNC: NEGATIVE MG/DL
LEUKOCYTE ESTERASE UR QL STRIP: NEGATIVE
NITRITE UR QL STRIP: NEGATIVE
PH UR STRIP: 5.5 PH UNITS
POTASSIUM SERPL-SCNC: 4 MMOL/L (ref 3.5–5.1)
POTASSIUM SERPL-SCNC: 4.5 MMOL/L (ref 3.5–5.1)
PROT UR QL STRIP: ABNORMAL
RBC # UR STRIP: ABNORMAL /UL
RBC #/AREA URNS HPF: ABNORMAL /HPF
SODIUM SERPL-SCNC: 109 MMOL/L (ref 136–145)
SODIUM SERPL-SCNC: 114 MMOL/L (ref 136–145)
SP GR UR STRIP: 1.01
SQUAMOUS #/AREA URNS LPF: ABNORMAL /LPF
TSH SERPL DL<=0.005 MIU/L-ACNC: 9.16 UIU/ML (ref 0.36–3.74)
UROBILINOGEN UR STRIP-ACNC: 0.2 MG/DL
WBC #/AREA URNS HPF: ABNORMAL /HPF

## 2021-12-02 PROCEDURE — 25000003 PHARM REV CODE 250: Performed by: HOSPITALIST

## 2021-12-02 PROCEDURE — 81001 URINALYSIS AUTO W/SCOPE: CPT | Performed by: HOSPITALIST

## 2021-12-02 PROCEDURE — 11000004 HC SNF PRIVATE

## 2021-12-02 PROCEDURE — 81003 URINALYSIS AUTO W/O SCOPE: CPT | Performed by: HOSPITALIST

## 2021-12-02 PROCEDURE — 25000003 PHARM REV CODE 250: Performed by: NURSE PRACTITIONER

## 2021-12-02 PROCEDURE — 27000958

## 2021-12-02 PROCEDURE — 99900035 HC TECH TIME PER 15 MIN (STAT)

## 2021-12-02 PROCEDURE — 36415 COLL VENOUS BLD VENIPUNCTURE: CPT | Performed by: HOSPITALIST

## 2021-12-02 PROCEDURE — 80048 BASIC METABOLIC PNL TOTAL CA: CPT | Performed by: HOSPITALIST

## 2021-12-02 PROCEDURE — 99308 SBSQ NF CARE LOW MDM 20: CPT | Mod: ,,, | Performed by: HOSPITALIST

## 2021-12-02 PROCEDURE — 84443 ASSAY THYROID STIM HORMONE: CPT | Performed by: HOSPITALIST

## 2021-12-02 PROCEDURE — 99308 PR NURSING FAC CARE, SUBSEQ, MINOR COMPLIC: ICD-10-PCS | Mod: ,,, | Performed by: HOSPITALIST

## 2021-12-02 PROCEDURE — 63600175 PHARM REV CODE 636 W HCPCS: Performed by: HOSPITALIST

## 2021-12-02 PROCEDURE — 94640 AIRWAY INHALATION TREATMENT: CPT

## 2021-12-02 PROCEDURE — 94761 N-INVAS EAR/PLS OXIMETRY MLT: CPT

## 2021-12-02 PROCEDURE — 84300 ASSAY OF URINE SODIUM: CPT | Performed by: HOSPITALIST

## 2021-12-02 PROCEDURE — 25000242 PHARM REV CODE 250 ALT 637 W/ HCPCS: Performed by: NURSE PRACTITIONER

## 2021-12-02 PROCEDURE — 27000221 HC OXYGEN, UP TO 24 HOURS

## 2021-12-02 RX ORDER — VENLAFAXINE HYDROCHLORIDE 75 MG/1
75 CAPSULE, EXTENDED RELEASE ORAL DAILY
Status: DISCONTINUED | OUTPATIENT
Start: 2021-12-03 | End: 2021-12-06 | Stop reason: HOSPADM

## 2021-12-02 RX ORDER — SODIUM CHLORIDE 9 MG/ML
INJECTION, SOLUTION INTRAVENOUS ONCE
Status: COMPLETED | OUTPATIENT
Start: 2021-12-02 | End: 2021-12-02

## 2021-12-02 RX ORDER — FAMOTIDINE 20 MG/1
20 TABLET, FILM COATED ORAL DAILY
Status: DISCONTINUED | OUTPATIENT
Start: 2021-12-02 | End: 2021-12-06 | Stop reason: HOSPADM

## 2021-12-02 RX ADMIN — MELATONIN 6 MG: 3 TAB ORAL at 09:12

## 2021-12-02 RX ADMIN — BUDESONIDE 0.5 MG: 0.5 INHALANT ORAL at 07:12

## 2021-12-02 RX ADMIN — BUDESONIDE 0.5 MG: 0.5 INHALANT ORAL at 08:12

## 2021-12-02 RX ADMIN — ENOXAPARIN SODIUM 30 MG: 30 INJECTION SUBCUTANEOUS at 09:12

## 2021-12-02 RX ADMIN — ACETAMINOPHEN 650 MG: 325 TABLET ORAL at 09:12

## 2021-12-02 RX ADMIN — LOSARTAN POTASSIUM 25 MG: 25 TABLET, FILM COATED ORAL at 09:12

## 2021-12-02 RX ADMIN — HYDRALAZINE HYDROCHLORIDE 25 MG: 25 TABLET ORAL at 09:12

## 2021-12-02 RX ADMIN — ROPINIROLE 2 MG: 1 TABLET, FILM COATED ORAL at 02:12

## 2021-12-02 RX ADMIN — FAMOTIDINE 20 MG: 20 TABLET, FILM COATED ORAL at 11:12

## 2021-12-02 RX ADMIN — SODIUM CHLORIDE: 9 INJECTION, SOLUTION INTRAVENOUS at 02:12

## 2021-12-02 RX ADMIN — SENNOSIDES AND DOCUSATE SODIUM 1 TABLET: 8.6; 5 TABLET ORAL at 09:12

## 2021-12-02 RX ADMIN — ROPINIROLE 2 MG: 1 TABLET, FILM COATED ORAL at 09:12

## 2021-12-02 RX ADMIN — VENLAFAXINE HYDROCHLORIDE 150 MG: 75 CAPSULE, EXTENDED RELEASE ORAL at 09:12

## 2021-12-02 RX ADMIN — IPRATROPIUM BROMIDE AND ALBUTEROL SULFATE 3 ML: .5; 3 SOLUTION RESPIRATORY (INHALATION) at 07:12

## 2021-12-02 RX ADMIN — OXYBUTYNIN CHLORIDE 10 MG: 5 TABLET, EXTENDED RELEASE ORAL at 09:12

## 2021-12-02 RX ADMIN — IPRATROPIUM BROMIDE AND ALBUTEROL SULFATE 3 ML: .5; 3 SOLUTION RESPIRATORY (INHALATION) at 01:12

## 2021-12-02 RX ADMIN — ZINC OXIDE TOPICAL OINT.: at 09:12

## 2021-12-02 RX ADMIN — HYDRALAZINE HYDROCHLORIDE 25 MG: 25 TABLET ORAL at 02:12

## 2021-12-02 RX ADMIN — THYROID 90 MG: 90 TABLET ORAL at 05:12

## 2021-12-02 RX ADMIN — IPRATROPIUM BROMIDE AND ALBUTEROL SULFATE 3 ML: .5; 3 SOLUTION RESPIRATORY (INHALATION) at 08:12

## 2021-12-02 RX ADMIN — THYROID, PORCINE 15 MG: 15 TABLET ORAL at 05:12

## 2021-12-02 RX ADMIN — ESTRADIOL 1 MG: 0.5 TABLET ORAL at 09:12

## 2021-12-02 RX ADMIN — PANTOPRAZOLE SODIUM 40 MG: 40 TABLET, DELAYED RELEASE ORAL at 09:12

## 2021-12-02 RX ADMIN — DIPHENHYDRAMINE HYDROCHLORIDE 25 MG: 25 TABLET ORAL at 09:12

## 2021-12-02 NOTE — PLAN OF CARE
Patient discussed at multidisciplinary team meeting. Due to shortness of breath, patient has been unable to participate in therapy on a regular basis. Will continue to monitor and evaluate patient's needs.

## 2021-12-02 NOTE — PROGRESS NOTES
[facility] [place] [facility] Medicine  Progress Note    Patient Name: Courtney Pickens  MRN: [id number]  Patient Class: IP- Swing   Admission Date: 11/19/2021  Length of Stay: 13 days  Attending Physician: Austin Valencia DO  Primary Care Provider: Primary Doctor No        Subjective:     Principal Problem:Chronic congestive heart failure        HPI:  80 yr old Female admitted for swing-bed following an admit at [facility] with ICU stay since 11/15 for acute heart failure, hypoxemic resp failure, acute renal failure, hyperkalemia, hyponatremia, lactic acidosis, sepsis and anemia.  She continues to take PO augmentin for pneumonia.  Ms Pickens has collins past medical hx of HTN, hypothyroidism, hyperlipidemia, and restless leg syndrome.  She is admitted for medical management and rehab.        Overview/Hospital Course:  11/24 Feels groggy this am but no SOB.  Wants to go on pass.  No major issues.   11/26 Patient and family stating patient doesn't seem to be improving and peripheral edema increasing. Review of patient chart indicates a 4 lb weight game over past 2 days. Potassium level down to 3.0. Potassium supplement was changed from tablet to liquid because patient states trouble swallowing tablet; however, nurse states patient does not always drink all her potassium supplement.  11/29 Audra RN reports Ms Pickens and her daughter have concerns today.  Ms Pickens was found resting in bed.  She reports of continued swelling in bilateral legs and thinks may be caused by her BP med being changed from irbesartan to amlodipine.  Also c/o feeling more weak every day. Telemed Consult via audio/ video with Dr Buck via audio/ video, she agrees with changing BP med to ARB and request lab today to include BNP, B12, Vitamin D and TSH.    11/30 asked to see patient for increased edema, inc SOB and O2 use  12/1 States she doesn't feel good, but no other details.  Weight about the same.  Good diuresis overnight.    12/2 Na level still dropping despite fluid restrictions, breathing seems stable       Interval History: about the same, still with leg swelling    Review of Systems   Respiratory: Negative for shortness of breath.    Cardiovascular: Positive for leg swelling. Negative for chest pain.   Gastrointestinal: Negative for abdominal pain, nausea and vomiting.   Psychiatric/Behavioral: Negative for agitation and confusion.   All other systems reviewed and are negative.    Objective:     Vital Signs (Most Recent):  Temp: 97.7 °F (36.5 °C) (12/02/21 0902)  Pulse: 61 (12/02/21 1344)  Resp: 20 (12/02/21 1344)  BP: 136/60 (12/02/21 0902)  SpO2: (!) 90 % (12/02/21 1344) Vital Signs (24h Range):  Temp:  [97.4 °F (36.3 °C)-97.7 °F (36.5 °C)] 97.7 °F (36.5 °C)  Pulse:  [60-90] 61  Resp:  [18-22] 20  SpO2:  [89 %-99 %] 90 %  BP: (136-149)/(57-60) 136/60     Weight: 80 kg (176 lb 5.9 oz)  Body mass index is 32.26 kg/m².    Intake/Output Summary (Last 24 hours) at 12/2/2021 1349  Last data filed at 12/2/2021 1300  Gross per 24 hour   Intake 440 ml   Output 100 ml   Net 340 ml      Physical Exam  Vitals and nursing note reviewed.   Constitutional:       General: She is not in acute distress.     Appearance: She is obese.   HENT:      Nose: No congestion.      Mouth/Throat:      Mouth: Mucous membranes are moist.   Eyes:      Extraocular Movements: Extraocular movements intact.      Conjunctiva/sclera: Conjunctivae normal.      Pupils: Pupils are equal, round, and reactive to light.   Cardiovascular:      Rate and Rhythm: Normal rate and regular rhythm.   Pulmonary:      Effort: Pulmonary effort is normal. No respiratory distress.      Breath sounds: No rales.      Comments: Faint exp wheeze at times but no rales  Abdominal:      General: Abdomen is flat. Bowel sounds are normal. There is no distension.      Palpations: Abdomen is soft.      Tenderness: There is no abdominal tenderness.   Musculoskeletal:      Right lower leg: Edema  present.      Left lower leg: Edema present.   Skin:     General: Skin is warm and dry.   Neurological:      General: No focal deficit present.      Mental Status: She is alert.   Psychiatric:         Mood and Affect: Mood normal.         Thought Content: Thought content normal.         Significant Labs:   All pertinent labs within the past 24 hours have been reviewed.  Recent Lab Results       12/02/21  0802        Anion Gap 15       BUN 20       BUN/CREAT RATIO 14       Calcium 9.0       Chloride 81       CO2 22       Creatinine 1.46       eGFR if non African American 37       Glucose 100       Potassium 4.0       Sodium 114       TSH 9.157             Significant Imaging: I have reviewed all pertinent imaging results/findings within the past 24 hours.  Echo: I have reviewed all pertinent results/findings within the past 24 hours and my personal findings are:  Report reviewed from [facility]  EF 70-75%, LV systolic function normal, RV mildly elevated RVSP 65mmHg      Assessment/Plan:      Chronic right-sided heart failure  On O2 for Pulm HTN, needs pre-load.  Will remove restrictions.        Pulmonary HTN  Reviewed Echo results.  RVSP 65.  Needs pre-load. Will stop fluid restrictions.  Give NS and monitor NA level.       Hyponatremia  Likely lasix related.  Holding diuretics for now.       Hypokalemia  Monitor and replace as needed.  On daily.       Essential hypertension  Restart on ARB as was on before admit to [facility]  Monitor VS    Pneumonia  Seems resolved.  Dealing with fluid now.         VTE Risk Mitigation (From admission, onward)         Ordered     enoxaparin injection 30 mg  Daily         12/01/21 1359     IP VTE LOW RISK PATIENT  Once         11/19/21 1549                Discharge Planning   DOREEN:      Code Status: Full Code   Is the patient medically ready for discharge?:     Reason for patient still in hospital (select all that apply): Patient trending condition, Laboratory test, Treatment and PT / OT  recommendations  Discharge Plan A: Home                  Austin Valencia DO  Department of Hospital Medicine   [facility] Medical Surgical Unit

## 2021-12-02 NOTE — ASSESSMENT & PLAN NOTE
Reviewed Echo results.  RVSP 65.  Needs pre-load. Will stop fluid restrictions.  Give NS and monitor NA level.

## 2021-12-02 NOTE — NURSING
Patient and family expressed concern regarding increased shortness of breath and clothing not fitting. Discussed concerns with Dr. Valencia, he requests records/last echo from [facility]. I also spoke with daughter, Kristen and gave an update regarding labs/treatment. Daughter in law also requests urine sample be obtained to check for infection, states she is 'acting funny how she does when she has an infection'. Also requests xray of her tailbone due to pain from a fall yesterday.

## 2021-12-02 NOTE — SUBJECTIVE & OBJECTIVE
Interval History: about the same, still with leg swelling    Review of Systems   Respiratory: Negative for shortness of breath.    Cardiovascular: Positive for leg swelling. Negative for chest pain.   Gastrointestinal: Negative for abdominal pain, nausea and vomiting.   Psychiatric/Behavioral: Negative for agitation and confusion.   All other systems reviewed and are negative.    Objective:     Vital Signs (Most Recent):  Temp: 97.7 °F (36.5 °C) (12/02/21 0902)  Pulse: 61 (12/02/21 1344)  Resp: 20 (12/02/21 1344)  BP: 136/60 (12/02/21 0902)  SpO2: (!) 90 % (12/02/21 1344) Vital Signs (24h Range):  Temp:  [97.4 °F (36.3 °C)-97.7 °F (36.5 °C)] 97.7 °F (36.5 °C)  Pulse:  [60-90] 61  Resp:  [18-22] 20  SpO2:  [89 %-99 %] 90 %  BP: (136-149)/(57-60) 136/60     Weight: 80 kg (176 lb 5.9 oz)  Body mass index is 32.26 kg/m².    Intake/Output Summary (Last 24 hours) at 12/2/2021 1349  Last data filed at 12/2/2021 1300  Gross per 24 hour   Intake 440 ml   Output 100 ml   Net 340 ml      Physical Exam  Vitals and nursing note reviewed.   Constitutional:       General: She is not in acute distress.     Appearance: She is obese.   HENT:      Nose: No congestion.      Mouth/Throat:      Mouth: Mucous membranes are moist.   Eyes:      Extraocular Movements: Extraocular movements intact.      Conjunctiva/sclera: Conjunctivae normal.      Pupils: Pupils are equal, round, and reactive to light.   Cardiovascular:      Rate and Rhythm: Normal rate and regular rhythm.   Pulmonary:      Effort: Pulmonary effort is normal. No respiratory distress.      Breath sounds: No rales.      Comments: Faint exp wheeze at times but no rales  Abdominal:      General: Abdomen is flat. Bowel sounds are normal. There is no distension.      Palpations: Abdomen is soft.      Tenderness: There is no abdominal tenderness.   Musculoskeletal:      Right lower leg: Edema present.      Left lower leg: Edema present.   Skin:     General: Skin is warm and dry.    Neurological:      General: No focal deficit present.      Mental Status: She is alert.   Psychiatric:         Mood and Affect: Mood normal.         Thought Content: Thought content normal.         Significant Labs:   All pertinent labs within the past 24 hours have been reviewed.  Recent Lab Results       12/02/21  0802        Anion Gap 15       BUN 20       BUN/CREAT RATIO 14       Calcium 9.0       Chloride 81       CO2 22       Creatinine 1.46       eGFR if non African American 37       Glucose 100       Potassium 4.0       Sodium 114       TSH 9.157             Significant Imaging: I have reviewed all pertinent imaging results/findings within the past 24 hours.  Echo: I have reviewed all pertinent results/findings within the past 24 hours and my personal findings are:  Report reviewed from . .  EF 70-75%, LV systolic function normal, RV mildly elevated RVSP 65mmHg

## 2021-12-02 NOTE — PROGRESS NOTES
Nursing aware of pt not feeling well.  SB coordinator discussing pt complaints with pt daughter and family.  No tx today per pt request.

## 2021-12-02 NOTE — PROGRESS NOTES
[facility] Medical Surgical Unit  Adult Nutrition  Progress Note         Reason for Assessment  Reason For Assessment: consult (swing bed)   Nutrition Risk Screen: no indicators present  Malnutrition  Is Patient Malnourished: No  Skin Integrity  Marvel Risk Assessment  Sensory Perception: 3-->slightly limited  Moisture: 3-->occasionally moist  Activity: 3-->walks occasionally  Mobility: 3-->slightly limited  Nutrition: 3-->adequate  Friction and Shear: 2-->potential problem  Marvel Score: 17  Comments on skin integrity: none remarkable  Nutrition Diagnosis  Excessive fluid intake   related to Heart Failure as evidenced by non adherence to 1500ml fluid restriction    Nutrition Diagnosis Status: New      Comments: Pt has a fluid restriction of 1500ml. Patient is drinking fluids that are not provided by the facility. Sending a dry tray and encouraged patient to adhere to fluid restriction.   Nutrition Risk  Level of Risk/Frequency of Follow-up: moderate - high  Comments on nutrition risk: Pt is very swollen. Pt has excessive fluid build-up with excessive fluid intake.    Recent Labs   Lab 12/02/21  0802        Comments on Glucose: none remarkable  Nutrition Prescription / Recommendations  Recommendation/Intervention: Dry tray due that patient has outside fluids to drink.  Goals: P.O. intake 100%, weight loss (fluid), no dry weight loss,  Nutrition Goal Status: new  Current Diet Order: Heart Healthy, 2g sodium , 1500ml fluid restriction  Chewing or Swallowing Difficulty?: No Chewing or swallowing difficulty  Recommended Diet: Low Sodium ( 2g Sodium)  Dry tray  Recommended Oral Supplement: No Oral Supplements  Is Nutrition Support Recommended: No    Monitor and Evaluation  % current Intake: P.O. intake of 25 - 50 %  % intake to meet estimated needs: 75 - 100 %  Food and Nutrient Intake: food and beverage intake  Food and Nutrient Adminstration: diet order  Knowledge/Beliefs/Attitudes: food and nutrition  "knowledge/skill  Anthropometric Measurements: weight change  Biochemical Data, Medical Tests and Procedures: electrolyte and renal panel,glucose/endocrine profile  Nutrition-Focused Physical Findings: overall appearance  Energy Calories Required: meeting needs  Protein Required: meeting needs  Fluid Required: exceeds needs  Tolerance: tolerating  Current Medical Diagnosis and Past Medical History  Diagnosis: cardiac disease,pulmonary disease  Past Medical History:   Diagnosis Date    Anxiety     Ocasio's esophagus     Depression     GERD (gastroesophageal reflux disease)     Hypothyroidism      Nutrition/Diet History  Spiritual, Cultural Beliefs, Gnosticist Practices, Values that Affect Care: no  Food Allergies: NKFA  Factors Affecting Nutritional Intake: None identified at this time  Lab/Procedures/Meds  Recent Labs   Lab 11/29/21  1022 12/01/21  0727 12/02/21  0802   *   < > 114*   K 3.5   < > 4.0   BUN 12   < > 20*   CREATININE 1.14*   < > 1.46*   GLU 84   < > 100   CALCIUM 8.6   < > 9.0   ALBUMIN 2.9*  --   --    CL 85*   < > 81*   ALT 30  --   --    AST 46*  --   --     < > = values in this interval not displayed.     Last A1c: No results found for: HGBA1C  Lab Results   Component Value Date    RBC 3.12 (L) 11/29/2021    HGB 9.5 (L) 11/29/2021    HCT 28.5 (L) 11/29/2021    MCV 91.3 11/29/2021    MCH 30.4 11/29/2021    MCHC 33.3 11/29/2021     Pertinent Labs Reviewed: reviewed  Pertinent Medications Reviewed: reviewed  Anthropometrics  Temp: 97.4 °F (36.3 °C)  Height Method: Stated  Height: 5' 2" (157.5 cm)  Height (inches): 62 in  Weight Method: Bed Scale  Weight: 80 kg (176 lb 5.9 oz)  Weight (lb): 176.37 lb  Ideal Body Weight (IBW), Female: 110 lb  % Ideal Body Weight, Female (lb): 160.34 %  BMI (Calculated): 32.2  BMI Grade: 30 - 34.9- obesity - grade I     Estimated/Assessed Needs  RMR ([place]-St. Jeor Equation): 1223.25 Activity Factor: 1.2 Injury Factor: 1     Temp: 97.4 °F (36.3 " °C)Oral  Weight Used For Calorie Calculations: 80 kg (176 lb 5.9 oz)   Energy Need Method: [place] Energy Calorie Requirements (kcal): 1467  Weight Used For Protein Calculations: 80 kg (176 lb 5.9 oz)  Protein Requirements: 80- 96  Estimated Fluid Requirement Method: RDA Method    RDA Method (mL): 1467     Nutrition by Nursing  Diet/Nutrition Received: regular  Intake (%): 25%  Diet/Feeding Assistance: none  Diet/Feeding Tolerance: fair  Last Bowel Movement: 12/01/21              Nutrition Follow-Up  RD Follow-up?: Yes  Assessment and Plan  No new Assessment & Plan notes have been filed under this hospital service since the last note was generated.  Service: Nutrition

## 2021-12-03 PROBLEM — N28.9 ACUTE RENAL INSUFFICIENCY: Status: ACTIVE | Noted: 2021-12-03

## 2021-12-03 PROBLEM — R13.10 DYSPHAGIA: Status: ACTIVE | Noted: 2021-12-03

## 2021-12-03 LAB
ANION GAP SERPL CALCULATED.3IONS-SCNC: 14 MMOL/L (ref 7–16)
BASOPHILS # BLD AUTO: 0.02 K/UL (ref 0–0.2)
BASOPHILS NFR BLD AUTO: 0.4 % (ref 0–1)
BUN SERPL-MCNC: 18 MG/DL (ref 7–18)
BUN SERPL-MCNC: 18 MG/DL (ref 7–18)
BUN SERPL-MCNC: 20 MG/DL (ref 7–18)
BUN/CREAT SERPL: 14 (ref 6–20)
BUN/CREAT SERPL: 14 (ref 6–20)
BUN/CREAT SERPL: 16 (ref 6–20)
CALCIUM SERPL-MCNC: 8.2 MG/DL (ref 8.5–10.1)
CALCIUM SERPL-MCNC: 8.2 MG/DL (ref 8.5–10.1)
CALCIUM SERPL-MCNC: 8.7 MG/DL (ref 8.5–10.1)
CHLORIDE SERPL-SCNC: 81 MMOL/L (ref 98–107)
CHLORIDE SERPL-SCNC: 81 MMOL/L (ref 98–107)
CHLORIDE SERPL-SCNC: 82 MMOL/L (ref 98–107)
CO2 SERPL-SCNC: 22 MMOL/L (ref 21–32)
CREAT SERPL-MCNC: 1.14 MG/DL (ref 0.55–1.02)
CREAT SERPL-MCNC: 1.27 MG/DL (ref 0.55–1.02)
CREAT SERPL-MCNC: 1.42 MG/DL (ref 0.55–1.02)
DIFFERENTIAL METHOD BLD: ABNORMAL
EOSINOPHIL # BLD AUTO: 0.02 K/UL (ref 0–0.5)
EOSINOPHIL NFR BLD AUTO: 0.4 % (ref 1–4)
ERYTHROCYTE [DISTWIDTH] IN BLOOD BY AUTOMATED COUNT: 12.6 % (ref 11.5–14.5)
GLUCOSE SERPL-MCNC: 110 MG/DL (ref 74–106)
GLUCOSE SERPL-MCNC: 111 MG/DL (ref 74–106)
GLUCOSE SERPL-MCNC: 88 MG/DL (ref 74–106)
HCT VFR BLD AUTO: 28.3 % (ref 38–47)
HGB BLD-MCNC: 9.9 G/DL (ref 12–16)
LYMPHOCYTES # BLD AUTO: 0.75 K/UL (ref 1–4.8)
LYMPHOCYTES NFR BLD AUTO: 15.7 % (ref 27–41)
LYMPHOCYTES NFR BLD MANUAL: 22 % (ref 27–41)
MCH RBC QN AUTO: 30.6 PG (ref 27–31)
MCHC RBC AUTO-ENTMCNC: 35 G/DL (ref 32–36)
MCV RBC AUTO: 87.3 FL (ref 80–96)
MONOCYTES # BLD AUTO: 0.61 K/UL (ref 0–0.8)
MONOCYTES NFR BLD AUTO: 12.8 % (ref 2–6)
MONOCYTES NFR BLD MANUAL: 8 % (ref 2–6)
MPC BLD CALC-MCNC: 8.2 FL (ref 9.4–12.4)
NEUTROPHILS # BLD AUTO: 3.38 K/UL (ref 1.8–7.7)
NEUTROPHILS NFR BLD AUTO: 70.7 % (ref 53–65)
NEUTS SEG NFR BLD MANUAL: 70 % (ref 50–62)
PLATELET # BLD AUTO: 404 K/UL (ref 150–400)
PLATELET MORPHOLOGY: NORMAL
POTASSIUM SERPL-SCNC: 4 MMOL/L (ref 3.5–5.1)
POTASSIUM SERPL-SCNC: 4.1 MMOL/L (ref 3.5–5.1)
POTASSIUM SERPL-SCNC: 4.1 MMOL/L (ref 3.5–5.1)
RBC # BLD AUTO: 3.24 M/UL (ref 4.2–5.4)
RBC MORPH BLD: NORMAL
SODIUM SERPL-SCNC: 113 MMOL/L (ref 136–145)
SODIUM SERPL-SCNC: 113 MMOL/L (ref 136–145)
SODIUM SERPL-SCNC: 114 MMOL/L (ref 136–145)
SODIUM UR-SCNC: 5 MMOL/L (ref 40–220)
WBC # BLD AUTO: 4.78 K/UL (ref 4.5–11)

## 2021-12-03 PROCEDURE — 27000958

## 2021-12-03 PROCEDURE — 94761 N-INVAS EAR/PLS OXIMETRY MLT: CPT

## 2021-12-03 PROCEDURE — 25000242 PHARM REV CODE 250 ALT 637 W/ HCPCS: Performed by: NURSE PRACTITIONER

## 2021-12-03 PROCEDURE — 92523 SPEECH SOUND LANG COMPREHEN: CPT

## 2021-12-03 PROCEDURE — 25000003 PHARM REV CODE 250: Performed by: NURSE PRACTITIONER

## 2021-12-03 PROCEDURE — 36415 COLL VENOUS BLD VENIPUNCTURE: CPT | Performed by: HOSPITALIST

## 2021-12-03 PROCEDURE — 99900035 HC TECH TIME PER 15 MIN (STAT)

## 2021-12-03 PROCEDURE — 94640 AIRWAY INHALATION TREATMENT: CPT

## 2021-12-03 PROCEDURE — 63600175 PHARM REV CODE 636 W HCPCS: Performed by: HOSPITALIST

## 2021-12-03 PROCEDURE — 11000004 HC SNF PRIVATE

## 2021-12-03 PROCEDURE — 99308 PR NURSING FAC CARE, SUBSEQ, MINOR COMPLIC: ICD-10-PCS | Mod: ,,, | Performed by: HOSPITALIST

## 2021-12-03 PROCEDURE — 36415 COLL VENOUS BLD VENIPUNCTURE: CPT | Performed by: NURSE PRACTITIONER

## 2021-12-03 PROCEDURE — 80048 BASIC METABOLIC PNL TOTAL CA: CPT | Performed by: HOSPITALIST

## 2021-12-03 PROCEDURE — 82533 TOTAL CORTISOL: CPT | Performed by: HOSPITALIST

## 2021-12-03 PROCEDURE — 27000221 HC OXYGEN, UP TO 24 HOURS

## 2021-12-03 PROCEDURE — 85025 COMPLETE CBC W/AUTO DIFF WBC: CPT | Performed by: NURSE PRACTITIONER

## 2021-12-03 PROCEDURE — 25000003 PHARM REV CODE 250: Performed by: HOSPITALIST

## 2021-12-03 PROCEDURE — 92610 EVALUATE SWALLOWING FUNCTION: CPT

## 2021-12-03 PROCEDURE — 99308 SBSQ NF CARE LOW MDM 20: CPT | Mod: ,,, | Performed by: HOSPITALIST

## 2021-12-03 PROCEDURE — 92522 EVALUATE SPEECH PRODUCTION: CPT

## 2021-12-03 RX ORDER — SODIUM CHLORIDE 9 MG/ML
INJECTION, SOLUTION INTRAVENOUS CONTINUOUS
Status: DISCONTINUED | OUTPATIENT
Start: 2021-12-03 | End: 2021-12-04

## 2021-12-03 RX ADMIN — HYDRALAZINE HYDROCHLORIDE 25 MG: 25 TABLET ORAL at 03:12

## 2021-12-03 RX ADMIN — VENLAFAXINE HYDROCHLORIDE 75 MG: 75 CAPSULE, EXTENDED RELEASE ORAL at 09:12

## 2021-12-03 RX ADMIN — ZINC OXIDE TOPICAL OINT.: at 09:12

## 2021-12-03 RX ADMIN — ACETAMINOPHEN 650 MG: 325 TABLET ORAL at 03:12

## 2021-12-03 RX ADMIN — IPRATROPIUM BROMIDE AND ALBUTEROL SULFATE 3 ML: .5; 3 SOLUTION RESPIRATORY (INHALATION) at 08:12

## 2021-12-03 RX ADMIN — ROPINIROLE 2 MG: 1 TABLET, FILM COATED ORAL at 09:12

## 2021-12-03 RX ADMIN — OXYBUTYNIN CHLORIDE 10 MG: 5 TABLET, EXTENDED RELEASE ORAL at 09:12

## 2021-12-03 RX ADMIN — MELATONIN 6 MG: 3 TAB ORAL at 09:12

## 2021-12-03 RX ADMIN — ACETAMINOPHEN 650 MG: 325 TABLET ORAL at 06:12

## 2021-12-03 RX ADMIN — THYROID, PORCINE 15 MG: 15 TABLET ORAL at 06:12

## 2021-12-03 RX ADMIN — FAMOTIDINE 20 MG: 20 TABLET, FILM COATED ORAL at 09:12

## 2021-12-03 RX ADMIN — HYDRALAZINE HYDROCHLORIDE 25 MG: 25 TABLET ORAL at 09:12

## 2021-12-03 RX ADMIN — ROPINIROLE 2 MG: 1 TABLET, FILM COATED ORAL at 03:12

## 2021-12-03 RX ADMIN — SODIUM CHLORIDE 125 ML/HR: 9 INJECTION, SOLUTION INTRAVENOUS at 09:12

## 2021-12-03 RX ADMIN — IPRATROPIUM BROMIDE AND ALBUTEROL SULFATE 3 ML: .5; 3 SOLUTION RESPIRATORY (INHALATION) at 07:12

## 2021-12-03 RX ADMIN — THYROID 90 MG: 90 TABLET ORAL at 06:12

## 2021-12-03 RX ADMIN — SODIUM CHLORIDE: 9 INJECTION, SOLUTION INTRAVENOUS at 09:12

## 2021-12-03 RX ADMIN — ESTRADIOL 1 MG: 0.5 TABLET ORAL at 09:12

## 2021-12-03 RX ADMIN — BUDESONIDE 0.5 MG: 0.5 INHALANT ORAL at 07:12

## 2021-12-03 RX ADMIN — ENOXAPARIN SODIUM 30 MG: 30 INJECTION SUBCUTANEOUS at 09:12

## 2021-12-03 RX ADMIN — IPRATROPIUM BROMIDE AND ALBUTEROL SULFATE 3 ML: .5; 3 SOLUTION RESPIRATORY (INHALATION) at 01:12

## 2021-12-03 NOTE — SUBJECTIVE & OBJECTIVE
Interval History: Sodium level still, is up from last night, stable currently on IVFs, some mild confusion.     Review of Systems   Respiratory: Negative for shortness of breath.    Cardiovascular: Negative for chest pain.   Gastrointestinal: Negative for abdominal pain, nausea and vomiting.   Neurological: Positive for weakness.   Psychiatric/Behavioral: Positive for confusion. Negative for agitation.   All other systems reviewed and are negative.    Objective:     Vital Signs (Most Recent):  Temp: 97.7 °F (36.5 °C) (12/03/21 0702)  Pulse: 61 (12/03/21 1318)  Resp: 20 (12/03/21 1318)  BP: (!) 158/50 (12/03/21 0702)  SpO2: 99 % (12/03/21 1318) Vital Signs (24h Range):  Temp:  [97.7 °F (36.5 °C)-98.6 °F (37 °C)] 97.7 °F (36.5 °C)  Pulse:  [54-74] 61  Resp:  [18-24] 20  SpO2:  [94 %-100 %] 99 %  BP: (139-158)/(50-68) 158/50     Weight: 81.6 kg (179 lb 12.8 oz)  Body mass index is 32.89 kg/m².    Intake/Output Summary (Last 24 hours) at 12/3/2021 1412  Last data filed at 12/3/2021 1300  Gross per 24 hour   Intake 360 ml   Output 103 ml   Net 257 ml      Physical Exam  Vitals and nursing note reviewed.   Constitutional:       General: She is not in acute distress.     Appearance: She is obese.   HENT:      Nose: No congestion.      Mouth/Throat:      Mouth: Mucous membranes are moist.   Eyes:      Extraocular Movements: Extraocular movements intact.      Conjunctiva/sclera: Conjunctivae normal.      Pupils: Pupils are equal, round, and reactive to light.   Cardiovascular:      Rate and Rhythm: Normal rate and regular rhythm.   Pulmonary:      Effort: Pulmonary effort is normal. No respiratory distress.      Breath sounds: No rales.      Comments: Faint exp wheeze at times not consistent,  but no rales  Abdominal:      General: Abdomen is flat. Bowel sounds are normal. There is no distension.      Palpations: Abdomen is soft.      Tenderness: There is no abdominal tenderness.   Musculoskeletal:      Right lower leg:  Edema present.      Left lower leg: Edema present.   Skin:     General: Skin is warm and dry.   Neurological:      General: No focal deficit present.      Mental Status: She is alert.      Comments: Mild confusion.     Psychiatric:         Mood and Affect: Mood normal.         Significant Labs:   All pertinent labs within the past 24 hours have been reviewed.  Recent Lab Results       12/03/21  1148   12/03/21  0439   12/03/21  0427   12/02/21  2042   12/02/21  1917        Anion Gap 14     14     15       Appearance, UA       Clear         Bacteria, UA       Rare         Baso #   0.02             Basophil %   0.4             Bilirubin (UA)       Negative         BUN 18     20     21       BUN/CREAT RATIO 14     14     15       Calcium 8.2     8.7     8.5       Chloride 81     81     80       CO2 22     22     19       Color, UA       Yellow         Creatinine 1.27     1.42     1.42       Differential Type   Manual             eGFR if non  43     38     38       Eos #   0.02             Eosinophil %   0.4             Glucose 111     88     98       Glucose, UA       Negative         Hematocrit   28.3             Hemoglobin   9.9             Ketones, UA       Negative         Leukocytes, UA       Negative         Lymph #   0.75             Lymph %   15.7                22             MCH   30.6             MCHC   35.0             MCV   87.3             Mono #   0.61             Mono %   12.8                8             MPV   8.2             Neutrophils, Abs   3.38             Neutrophils Relative   70.7             NITRITE UA       Negative         Occult Blood UA       Small         pH, UA       5.5         PLATELET MORPHOLOGY   Normal             Platelets   404             Potassium 4.1     4.1     4.5       Protein, UA       Trace         RBC   3.24             RBC Morphology   Normal             RBC, UA       25-50         RDW   12.6             Segmented Neutrophils, Man %   70              Sodium 113     113     109       Specific [place], UA       1.015         Squam Epithel, UA       Occasional         UROBILINOGEN UA       0.2         WBC, UA       0-5         WBC   4.78                                  Significant Imaging: I have reviewed all pertinent imaging results/findings within the past 24 hours.

## 2021-12-03 NOTE — ASSESSMENT & PLAN NOTE
Now on NS at 100cc/hr.  Monitoring labs and adjusting IVFs accordingly.  Urine Na pending, I feel this is not SIADH but pre-renal volume depletion.

## 2021-12-03 NOTE — NURSING
Awaiting lab results this am for Na+ level. Called lab to confirm order at 4am. Dr. Valencia awaiting results.

## 2021-12-03 NOTE — PT/OT/SLP EVAL
Speech Language Pathology Evaluation  Cognitive/Bedside Swallow    Patient Name:  Courtney Pickens   MRN:  [id number]  Admitting Diagnosis: Chronic congestive heart failure    Recommendations:                  General Recommendations:  Dysphagia therapy  Diet recommendations:  Mechanical soft,Chopped meat (this may change with MBS results), [place] Thick   Aspiration Precautions: pt in need of MBS at this time   General Precautions: Standard,    Communication strategies:  Pt able to speech for comm    History:     Past Medical History:   Diagnosis Date    Anxiety     Ocasio's esophagus     Depression     GERD (gastroesophageal reflux disease)     Hypothyroidism        Past Surgical History:   Procedure Laterality Date    CATARACT EXTRACTION      CHOLECYSTECTOMY      HYSTERECTOMY     .    Prior Intubation HX:  n/a    Modified Barium Swallow: order being written for procedure    Chest X-Rays: see chart    Prior diet: reg/thin... [facility] chopped meat/ nectar liquids.    Occupation/hobbies/homemaking: n/a.    Subjective     Pt seen in room watching pt take meds, later trial with soft solids... pt alert and cooperative asking to rest in 15 minutes   Patient goals: home     Pain/Comfort:  ·      Respiratory Status:  · O2 Device (Oxygen Therapy): nasal cannula    Objective:     Cognitive Status:    Safety awareness pt unaware of safety aspect of aspiration , even after explanation       Receptive Language:   Comprehension:      WFL          Expressive Language:  Verbal:    Able to express wants and needs for eval  Nonverbal:         Motor Speech:  Delayed     Voice:   Weak, wet... nursing reported pt voice has been same since admit    Visual-Spatial:  dnt    Reading:   dnt     Written Expression:   dnt    Oral Musculature Evaluation  · Oral Musculature: general weakness  · Dentition: present and adequate  · Secretion Management: coughing on secretions  · Mandibular Strength and Mobility:  (pt  "reports pain in cheek, pain when opening mouth too much)  · Oral Labial Strength and Mobility: WFL  · Lingual Strength and Mobility: WFL  · Velar Elevation: impaired  · Buccal Strength and Mobility: impaired  · Volitional Cough: weak cough upon request, pt reports she is "not strong enough" to cough  · Volitional Swallow: mod delay with volitional swallow  · Voice Prior to PO Intake: wet    Bedside Swallow Eval:   Consistencies Assessed:  · Thin liquids coughing/throat clearing, delayed   · Nectar thick liquids with chin tuck reduced coughing   · Soft solids with soft solids  delayed oral phase, coughing noted x3     Oral Phase:   · Lingual residue    Pharyngeal Phase:   · coughing/choking  · delayed swallow initation  · throat clearing    Compensatory Strategies  · Chin tuck  · Effortful swallow  · Lingual sweep  · Multiple swallows    Treatment: pt    Assessment:     Courtney Pickens is a 81 y.o. female with an SLP diagnosis of Dysphagia.  She presents with diff with medication, liquids, certain meat.    Goals:   Multidisciplinary Problems     SLP Goals        Problem: SLP Goal    Goal Priority Disciplines Outcome   SLP Goal     SLP Ongoing, Progressing   Description:  LTG:  pt to  tolerate least restricted diet with no overt s/s of aspiration       ST.  Pt in need of MBS study... spoke with  Regarding writing order.  2  Trial feedings to determine safest diet consist  3.  Oral motor exercises along with laryngeal exer to be completed indep with min cues needed   4.  Comp tech to be implemented to ensure safe swallow function                      Plan:     · Patient to be seen:  3 x/week   · Plan of Care expires:  22  · Plan of Care reviewed with:  patient   · SLP Follow-Up:          Discharge recommendations:  Discharge Facility/Level of Care Needs:  (ST called  asking for pt to go out for MBS study...)   Barriers to Discharge:  Aspiration risk    Time Tracking:     SLP Treatment Date:   " 12/03/21  Speech Start Time:  1200  Speech Stop Time:  1240     Speech Total Time (min):  40 min    Billable Minutes: 40    12/03/2021

## 2021-12-03 NOTE — PLAN OF CARE
I met with patient's family to discuss patient's care. Family member, Amairani concerned about sodium levels and falls. Amairani explained that patient seems more confused. UA performed yesterday was clear, I explained that confusion could be related to low sodium levels. Dr. Valencia made aware of concerns and met with patient and family. Will continue to monitor patient for further needs

## 2021-12-03 NOTE — NURSING
Pt reported she fell into the bedside table this morning at 0455am.  Bed alarm was on. Pt found sitting on the side of the bed. VS were stable. /66 T 98.6 P 65 R 22 O2 sat 95%  Cecilia CFNP notified and x-rays done on left arm. Small skin tear/bruise noted below elbow of left arm. This was cleaned and a telfa/kerlix applied. Pt c/o pain to left arm. Pt is slightly confused this morning. Aware of person and place but speaking out of context when discussing her getting up without asst. Bed alarm has been on and going on/off the entire night. I notified her daughter Kristen this morning.

## 2021-12-03 NOTE — PROGRESS NOTES
[facility] [place] [facility] Medicine  Progress Note    Patient Name: Courtney Pickens  MRN: [id number]  Patient Class: IP- Swing   Admission Date: 11/19/2021  Length of Stay: 14 days  Attending Physician: Austin Valencia DO  Primary Care Provider: Primary Doctor No        Subjective:     Principal Problem:Chronic congestive heart failure        HPI:  80 yr old Female admitted for swing-bed following an admit at [facility] with ICU stay since 11/15 for acute heart failure, hypoxemic resp failure, acute renal failure, hyperkalemia, hyponatremia, lactic acidosis, sepsis and anemia.  She continues to take PO augmentin for pneumonia.  Ms Pickens has collins past medical hx of HTN, hypothyroidism, hyperlipidemia, and restless leg syndrome.  She is admitted for medical management and rehab.        Overview/Hospital Course:  11/24 Feels groggy this am but no SOB.  Wants to go on pass.  No major issues.   11/26 Patient and family stating patient doesn't seem to be improving and peripheral edema increasing. Review of patient chart indicates a 4 lb weight game over past 2 days. Potassium level down to 3.0. Potassium supplement was changed from tablet to liquid because patient states trouble swallowing tablet; however, nurse states patient does not always drink all her potassium supplement.  11/29 Audra RN reports Ms Pickens and her daughter have concerns today.  Ms Pickens was found resting in bed.  She reports of continued swelling in bilateral legs and thinks may be caused by her BP med being changed from irbesartan to amlodipine.  Also c/o feeling more weak every day. Telemed Consult via audio/ video with Dr Buck via audio/ video, she agrees with changing BP med to ARB and request lab today to include BNP, B12, Vitamin D and TSH.    11/30 asked to see patient for increased edema, inc SOB and O2 use  12/1 States she doesn't feel good, but no other details.  Weight about the same.  Good diuresis overnight.    12/2 Na level still dropping despite fluid restrictions, breathing seems stable   12/03 called to room by JONE Evans LPN, pt reports she fell on bedside table. Pt was found sitting on side of bed when bed alarm activated. She has a bruise noted to the back of right upper arm with a 1 cm skin tear.  The would was cleaned and dressed.  She reports pain to upper arm. Will Xray to r/o fracture.     12/3 A couple of falls now.  Coccyx was fractured from fall on bottom, Arm xrays neg for fracture.       Interval History: Sodium level still, is up from last night, stable currently on IVFs, some mild confusion.     Review of Systems   Respiratory: Negative for shortness of breath.    Cardiovascular: Negative for chest pain.   Gastrointestinal: Negative for abdominal pain, nausea and vomiting.   Neurological: Positive for weakness.   Psychiatric/Behavioral: Positive for confusion. Negative for agitation.   All other systems reviewed and are negative.    Objective:     Vital Signs (Most Recent):  Temp: 97.7 °F (36.5 °C) (12/03/21 0702)  Pulse: 61 (12/03/21 1318)  Resp: 20 (12/03/21 1318)  BP: (!) 158/50 (12/03/21 0702)  SpO2: 99 % (12/03/21 1318) Vital Signs (24h Range):  Temp:  [97.7 °F (36.5 °C)-98.6 °F (37 °C)] 97.7 °F (36.5 °C)  Pulse:  [54-74] 61  Resp:  [18-24] 20  SpO2:  [94 %-100 %] 99 %  BP: (139-158)/(50-68) 158/50     Weight: 81.6 kg (179 lb 12.8 oz)  Body mass index is 32.89 kg/m².    Intake/Output Summary (Last 24 hours) at 12/3/2021 1412  Last data filed at 12/3/2021 1300  Gross per 24 hour   Intake 360 ml   Output 103 ml   Net 257 ml      Physical Exam  Vitals and nursing note reviewed.   Constitutional:       General: She is not in acute distress.     Appearance: She is obese.   HENT:      Nose: No congestion.      Mouth/Throat:      Mouth: Mucous membranes are moist.   Eyes:      Extraocular Movements: Extraocular movements intact.      Conjunctiva/sclera: Conjunctivae normal.      Pupils: Pupils are equal,  round, and reactive to light.   Cardiovascular:      Rate and Rhythm: Normal rate and regular rhythm.   Pulmonary:      Effort: Pulmonary effort is normal. No respiratory distress.      Breath sounds: No rales.      Comments: Faint exp wheeze at times not consistent,  but no rales  Abdominal:      General: Abdomen is flat. Bowel sounds are normal. There is no distension.      Palpations: Abdomen is soft.      Tenderness: There is no abdominal tenderness.   Musculoskeletal:      Right lower leg: Edema present.      Left lower leg: Edema present.   Skin:     General: Skin is warm and dry.   Neurological:      General: No focal deficit present.      Mental Status: She is alert.      Comments: Mild confusion.     Psychiatric:         Mood and Affect: Mood normal.         Significant Labs:   All pertinent labs within the past 24 hours have been reviewed.  Recent Lab Results       12/03/21  1148   12/03/21  0439   12/03/21  0427   12/02/21  2042   12/02/21  1917        Anion Gap 14     14     15       Appearance, UA       Clear         Bacteria, UA       Rare         Baso #   0.02             Basophil %   0.4             Bilirubin (UA)       Negative         BUN 18     20     21       BUN/CREAT RATIO 14     14     15       Calcium 8.2     8.7     8.5       Chloride 81     81     80       CO2 22     22     19       Color, UA       Yellow         Creatinine 1.27     1.42     1.42       Differential Type   Manual             eGFR if non  43     38     38       Eos #   0.02             Eosinophil %   0.4             Glucose 111     88     98       Glucose, UA       Negative         Hematocrit   28.3             Hemoglobin   9.9             Ketones, UA       Negative         Leukocytes, UA       Negative         Lymph #   0.75             Lymph %   15.7                22             MCH   30.6             MCHC   35.0             MCV   87.3             Mono #   0.61             Mono %   12.8                8              MPV   8.2             Neutrophils, Abs   3.38             Neutrophils Relative   70.7             NITRITE UA       Negative         Occult Blood UA       Small         pH, UA       5.5         PLATELET MORPHOLOGY   Normal             Platelets   404             Potassium 4.1     4.1     4.5       Protein, UA       Trace         RBC   3.24             RBC Morphology   Normal             RBC, UA       25-50         RDW   12.6             Segmented Neutrophils, Man %   70             Sodium 113     113     109       Specific [place], UA       1.015         Squam Epithel, UA       Occasional         UROBILINOGEN UA       0.2         WBC, UA       0-5         WBC   4.78                                  Significant Imaging: I have reviewed all pertinent imaging results/findings within the past 24 hours.      Assessment/Plan:      Acute renal insufficiency  Secondary to Lasix.       Chronic right-sided heart failure  On O2 for Pulm HTN, needs pre-load.  Will remove restrictions.        Pulmonary HTN  Reviewed Echo results.  RVSP 65.  Needs pre-load. Will stop fluid restrictions.  Give NS and monitor NA level.       Hyponatremia  Now on NS at 100cc/hr.  Monitoring labs and adjusting IVFs accordingly.  Urine Na pending, I feel this is not SIADH but pre-renal volume depletion.       Hypokalemia  Monitor and replace as needed.  On daily.       Essential hypertension  Restart on ARB as was on before admit to [facility]  Monitor VS    Pneumonia  Seems resolved.  Dealing with fluid now.       VTE Risk Mitigation (From admission, onward)         Ordered     enoxaparin injection 30 mg  Daily         12/01/21 1359     IP VTE LOW RISK PATIENT  Once         11/19/21 1549                Discharge Planning   DOREEN:      Code Status: Full Code   Is the patient medically ready for discharge?:     Reason for patient still in hospital (select all that apply): Patient trending condition, Laboratory test and PT / OT  recommendations  Discharge Plan A: Home                  Austin Valencia DO  Department of Hospital Medicine   [facility] - Medical Surgical Unit

## 2021-12-03 NOTE — PLAN OF CARE
Problem: SLP Goal  Goal: SLP Goal  Description:  LTG:  pt to  tolerate least restricted diet with no overt s/s of aspiration       ST.  Pt in need of MBS study... spoke with  Regarding writing order.  2  Trial feedings to determine safest diet consist  3.  Oral motor exercises along with laryngeal exer to be completed indep with min cues needed   4.  Comp tech to be implemented to ensure safe swallow function     Outcome: Ongoing, Progressing

## 2021-12-04 PROBLEM — J90 PLEURAL EFFUSION: Status: ACTIVE | Noted: 2021-12-04

## 2021-12-04 LAB
ANION GAP SERPL CALCULATED.3IONS-SCNC: 13 MMOL/L (ref 7–16)
ANION GAP SERPL CALCULATED.3IONS-SCNC: 15 MMOL/L (ref 7–16)
BASOPHILS # BLD AUTO: 0.02 K/UL (ref 0–0.2)
BASOPHILS NFR BLD AUTO: 0.3 % (ref 0–1)
BASOPHILS NFR BLD MANUAL: 0 % (ref 0–1)
BUN SERPL-MCNC: 14 MG/DL (ref 7–18)
BUN SERPL-MCNC: 15 MG/DL (ref 7–18)
BUN/CREAT SERPL: 14 (ref 6–20)
BUN/CREAT SERPL: 15 (ref 6–20)
CALCIUM SERPL-MCNC: 8.4 MG/DL (ref 8.5–10.1)
CALCIUM SERPL-MCNC: 8.6 MG/DL (ref 8.5–10.1)
CHLORIDE SERPL-SCNC: 82 MMOL/L (ref 98–107)
CHLORIDE SERPL-SCNC: 83 MMOL/L (ref 98–107)
CO2 SERPL-SCNC: 20 MMOL/L (ref 21–32)
CO2 SERPL-SCNC: 22 MMOL/L (ref 21–32)
CORTIS AM PEAK SERPL-MCNC: 13.1 ΜG/DL (ref 4.3–22.4)
CREAT SERPL-MCNC: 0.97 MG/DL (ref 0.55–1.02)
CREAT SERPL-MCNC: 1.03 MG/DL (ref 0.55–1.02)
DIFFERENTIAL METHOD BLD: ABNORMAL
EOSINOPHIL # BLD AUTO: 0.03 K/UL (ref 0–0.5)
EOSINOPHIL NFR BLD AUTO: 0.5 % (ref 1–4)
EOSINOPHIL NFR BLD MANUAL: 1 % (ref 1–4)
ERYTHROCYTE [DISTWIDTH] IN BLOOD BY AUTOMATED COUNT: 12.7 % (ref 11.5–14.5)
GLUCOSE SERPL-MCNC: 100 MG/DL (ref 74–106)
GLUCOSE SERPL-MCNC: 97 MG/DL (ref 74–106)
HCT VFR BLD AUTO: 27.4 % (ref 38–47)
HGB BLD-MCNC: 9.7 G/DL (ref 12–16)
LYMPHOCYTES # BLD AUTO: 0.81 K/UL (ref 1–4.8)
LYMPHOCYTES NFR BLD AUTO: 12.7 % (ref 27–41)
LYMPHOCYTES NFR BLD MANUAL: 13 % (ref 27–41)
MCH RBC QN AUTO: 30.9 PG (ref 27–31)
MCHC RBC AUTO-ENTMCNC: 35.4 G/DL (ref 32–36)
MCV RBC AUTO: 87.3 FL (ref 80–96)
MONOCYTES # BLD AUTO: 0.7 K/UL (ref 0–0.8)
MONOCYTES NFR BLD AUTO: 11 % (ref 2–6)
MONOCYTES NFR BLD MANUAL: 13 % (ref 2–6)
MPC BLD CALC-MCNC: 8.3 FL (ref 9.4–12.4)
NEUTROPHILS # BLD AUTO: 4.8 K/UL (ref 1.8–7.7)
NEUTROPHILS NFR BLD AUTO: 75.5 % (ref 53–65)
NEUTS SEG NFR BLD MANUAL: 73 % (ref 50–62)
PLATELET # BLD AUTO: 437 K/UL (ref 150–400)
POTASSIUM SERPL-SCNC: 3.9 MMOL/L (ref 3.5–5.1)
POTASSIUM SERPL-SCNC: 4.4 MMOL/L (ref 3.5–5.1)
RBC # BLD AUTO: 3.14 M/UL (ref 4.2–5.4)
SODIUM SERPL-SCNC: 113 MMOL/L (ref 136–145)
SODIUM SERPL-SCNC: 114 MMOL/L (ref 136–145)
WBC # BLD AUTO: 6.36 K/UL (ref 4.5–11)

## 2021-12-04 PROCEDURE — 27000958

## 2021-12-04 PROCEDURE — 80048 BASIC METABOLIC PNL TOTAL CA: CPT | Performed by: HOSPITALIST

## 2021-12-04 PROCEDURE — 36415 COLL VENOUS BLD VENIPUNCTURE: CPT | Performed by: HOSPITALIST

## 2021-12-04 PROCEDURE — 25000003 PHARM REV CODE 250: Performed by: HOSPITALIST

## 2021-12-04 PROCEDURE — 25500020 PHARM REV CODE 255: Performed by: HOSPITALIST

## 2021-12-04 PROCEDURE — 99900035 HC TECH TIME PER 15 MIN (STAT)

## 2021-12-04 PROCEDURE — 25000242 PHARM REV CODE 250 ALT 637 W/ HCPCS: Performed by: NURSE PRACTITIONER

## 2021-12-04 PROCEDURE — 94761 N-INVAS EAR/PLS OXIMETRY MLT: CPT

## 2021-12-04 PROCEDURE — 94640 AIRWAY INHALATION TREATMENT: CPT

## 2021-12-04 PROCEDURE — 85025 COMPLETE CBC W/AUTO DIFF WBC: CPT | Performed by: NURSE PRACTITIONER

## 2021-12-04 PROCEDURE — 11000004 HC SNF PRIVATE

## 2021-12-04 PROCEDURE — 63600175 PHARM REV CODE 636 W HCPCS: Performed by: HOSPITALIST

## 2021-12-04 PROCEDURE — 27000221 HC OXYGEN, UP TO 24 HOURS

## 2021-12-04 PROCEDURE — 25000003 PHARM REV CODE 250: Performed by: NURSE PRACTITIONER

## 2021-12-04 RX ORDER — NYSTATIN 100000 [USP'U]/ML
500000 SUSPENSION ORAL
Status: DISCONTINUED | OUTPATIENT
Start: 2021-12-04 | End: 2021-12-06 | Stop reason: HOSPADM

## 2021-12-04 RX ADMIN — NYSTATIN 500000 UNITS: 500000 SUSPENSION ORAL at 08:12

## 2021-12-04 RX ADMIN — ROPINIROLE 2 MG: 1 TABLET, FILM COATED ORAL at 08:12

## 2021-12-04 RX ADMIN — BUDESONIDE 0.5 MG: 0.5 INHALANT ORAL at 07:12

## 2021-12-04 RX ADMIN — ENOXAPARIN SODIUM 30 MG: 30 INJECTION SUBCUTANEOUS at 08:12

## 2021-12-04 RX ADMIN — IPRATROPIUM BROMIDE AND ALBUTEROL SULFATE 3 ML: .5; 3 SOLUTION RESPIRATORY (INHALATION) at 07:12

## 2021-12-04 RX ADMIN — FAMOTIDINE 20 MG: 20 TABLET, FILM COATED ORAL at 09:12

## 2021-12-04 RX ADMIN — HYDRALAZINE HYDROCHLORIDE 25 MG: 25 TABLET ORAL at 08:12

## 2021-12-04 RX ADMIN — IPRATROPIUM BROMIDE AND ALBUTEROL SULFATE 3 ML: .5; 3 SOLUTION RESPIRATORY (INHALATION) at 04:12

## 2021-12-04 RX ADMIN — ROPINIROLE 2 MG: 1 TABLET, FILM COATED ORAL at 10:12

## 2021-12-04 RX ADMIN — THYROID 90 MG: 90 TABLET ORAL at 05:12

## 2021-12-04 RX ADMIN — HYDRALAZINE HYDROCHLORIDE 25 MG: 25 TABLET ORAL at 03:12

## 2021-12-04 RX ADMIN — SODIUM CHLORIDE 125 ML/HR: 9 INJECTION, SOLUTION INTRAVENOUS at 05:12

## 2021-12-04 RX ADMIN — IOPAMIDOL 50 ML: 755 INJECTION, SOLUTION INTRAVENOUS at 09:12

## 2021-12-04 RX ADMIN — ROPINIROLE 2 MG: 1 TABLET, FILM COATED ORAL at 03:12

## 2021-12-04 RX ADMIN — VENLAFAXINE HYDROCHLORIDE 75 MG: 75 CAPSULE, EXTENDED RELEASE ORAL at 10:12

## 2021-12-04 RX ADMIN — NYSTATIN 500000 UNITS: 500000 SUSPENSION ORAL at 04:12

## 2021-12-04 RX ADMIN — ESTRADIOL 1 MG: 0.5 TABLET ORAL at 10:12

## 2021-12-04 RX ADMIN — HYDRALAZINE HYDROCHLORIDE 25 MG: 25 TABLET ORAL at 10:12

## 2021-12-04 RX ADMIN — THYROID, PORCINE 15 MG: 15 TABLET ORAL at 05:12

## 2021-12-04 RX ADMIN — ZINC OXIDE TOPICAL OINT.: at 09:12

## 2021-12-04 RX ADMIN — OXYBUTYNIN CHLORIDE 10 MG: 5 TABLET, EXTENDED RELEASE ORAL at 10:12

## 2021-12-04 NOTE — PROGRESS NOTES
"Pts family member stated, "she is not able" when DIAZ asked pt if she was ready for tx on this date. No other details/reasons were given. Pts nurse notified and agreed to defer tx on this date.   "

## 2021-12-04 NOTE — ASSESSMENT & PLAN NOTE
IV fluids stopped per Dr. Valencia due to not responding to fluids and edema.    Encouraged salt in diet today

## 2021-12-04 NOTE — PROGRESS NOTES
[facility] Medicine  Progress Note    Patient Name: Courtney Pickens  MRN: [id number]  Patient Class: IP- Swing   Admission Date: 11/19/2021  Length of Stay: 15 days  Attending Physician: Austin Valencia DO  Primary Care Provider: Primary Doctor No        Subjective:     Principal Problem:Chronic congestive heart failure        HPI:  80 yr old Female admitted for swing-bed following an admit at [facility] with ICU stay since 11/15 for acute heart failure, hypoxemic resp failure, acute renal failure, hyperkalemia, hyponatremia, lactic acidosis, sepsis and anemia.  She continues to take PO augmentin for pneumonia.  Ms Pickens has collins past medical hx of HTN, hypothyroidism, hyperlipidemia, and restless leg syndrome.  She is admitted for medical management and rehab.        Overview/Hospital Course:  11/24 Feels groggy this am but no SOB.  Wants to go on pass.  No major issues.   11/26 Patient and family stating patient doesn't seem to be improving and peripheral edema increasing. Review of patient chart indicates a 4 lb weight game over past 2 days. Potassium level down to 3.0. Potassium supplement was changed from tablet to liquid because patient states trouble swallowing tablet; however, nurse states patient does not always drink all her potassium supplement.  11/29 Audra RN reports Ms Pickens and her daughter have concerns today.  Ms Pickens was found resting in bed.  She reports of continued swelling in bilateral legs and thinks may be caused by her BP med being changed from irbesartan to amlodipine.  Also c/o feeling more weak every day. Telemed Consult via audio/ video with Dr Buck via audio/ video, she agrees with changing BP med to ARB and request lab today to include BNP, B12, Vitamin D and TSH.    11/30 asked to see patient for increased edema, inc SOB and O2 use  12/1 States she doesn't feel good, but no other details.  Weight about the same.  Good diuresis overnight.    12/2 Na level still dropping despite fluid restrictions, breathing seems stable   12/03 called to room by JONE Evans LPN, pt reports she fell on bedside table. Pt was found sitting on side of bed when bed alarm activated. She has a bruise noted to the back of right upper arm with a 1 cm skin tear.  The would was cleaned and dressed.  She reports pain to upper arm. Will Xray to r/o fracture.     12/3 A couple of falls now.  Coccyx was fractured from fall on bottom, Arm xrays neg for fracture.   12/04 0730 pt with noted periorbital edema and edema to face and neck.  Reports SOB.  Dr. Valencia was notified by staff and has ordered CT.  Discussed care with Dr. Valencia who asked that we check CT results and consult with on-call MD      Interval History: 1 day    Review of Systems   Constitutional: Positive for fatigue. Negative for appetite change and fever.   Respiratory: Positive for cough, shortness of breath and wheezing.    Cardiovascular: Negative for chest pain.   Skin: Negative for color change.   Neurological: Negative for dizziness.   Psychiatric/Behavioral: Positive for confusion.     Objective:     Vital Signs (Most Recent):  Temp: 97.6 °F (36.4 °C) (12/03/21 2026)  Pulse: 60 (12/04/21 0731)  Resp: 20 (12/04/21 0731)  BP: (!) 159/65 (12/03/21 2026)  SpO2: (!) 94 % (12/04/21 1100) Vital Signs (24h Range):  Temp:  [97.6 °F (36.4 °C)] 97.6 °F (36.4 °C)  Pulse:  [58-83] 60  Resp:  [20-21] 20  SpO2:  [94 %-99 %] 94 %  BP: (140-159)/(65-67) 159/65     Weight: 84.1 kg (185 lb 6.5 oz)  Body mass index is 33.91 kg/m².    Intake/Output Summary (Last 24 hours) at 12/4/2021 1248  Last data filed at 12/4/2021 0537  Gross per 24 hour   Intake 3347.92 ml   Output 200 ml   Net 3147.92 ml      Physical Exam  Constitutional:       General: She is awake.      Appearance: She is obese. She is ill-appearing.   HENT:      Head:        Mouth/Throat:      Mouth: Mucous membranes are moist. Oral lesions present.      Pharynx:  Posterior oropharyngeal erythema present.      Comments: Noted erythematous tongue and whitish lesions to bilateral bucal  Neck:      Comments: Noted edema to face and neck  Cardiovascular:      Rate and Rhythm: Normal rate and regular rhythm.   Pulmonary:      Effort: Pulmonary effort is normal. No respiratory distress.      Breath sounds: Examination of the right-lower field reveals decreased breath sounds and rales. Examination of the left-lower field reveals decreased breath sounds and rales. Decreased breath sounds, wheezing and rales present.      Comments: Bronchial wheeze,   Mild rales to bases, diminished to bases.  Abdominal:      Palpations: Abdomen is soft.      Tenderness: There is no abdominal tenderness.   Musculoskeletal:      Cervical back: Normal range of motion.      Right lower leg: Edema present.      Left lower leg: Edema present.   Skin:     General: Skin is warm and dry.      Coloration: Skin is not pale.   Neurological:      Mental Status: She is alert.   Psychiatric:         Mood and Affect: Mood normal.         Behavior: Behavior is cooperative.         Significant Labs: All pertinent labs within the past 24 hours have been reviewed.    Significant Imaging: I have reviewed all pertinent imaging results/findings within the past 24 hours.      Assessment/Plan:      Dysphagia  Changed to dysphagia diet and try to get MBS.      Acute renal insufficiency  Secondary to Lasix.       Chronic right-sided heart failure  On O2 for Pulm HTN, needs pre-load.  Will remove restrictions.        Pulmonary HTN  Reviewed Echo results.  RVSP 65.  Needs pre-load. Will stop fluid restrictions.  Give NS and monitor NA level.       Hyponatremia  IV fluids stopped per Dr. Valencia due to not responding to fluids and edema.    Encouraged salt in diet today    Hypokalemia  Monitor and replace as needed.  On daily.       Essential hypertension  Restart on ARB as was on before admit to [facility]  Monitor  VS    Pneumonia  Seems resolved.  Dealing with fluid now.       VTE Risk Mitigation (From admission, onward)         Ordered     enoxaparin injection 30 mg  Daily         12/01/21 1359     IP VTE LOW RISK PATIENT  Once         11/19/21 1549                Discharge Planning   DOREEN:      Code Status: Full Code   Is the patient medically ready for discharge?:     Reason for patient still in hospital (select all that apply): Patient trending condition  Discharge Plan A: Home                  VIOLA Snell  Department of Hospital Medicine   [facility]

## 2021-12-04 NOTE — PROGRESS NOTES
Discussed CT results and Pt status with Dr. Grover who suggest transfer for further evaluation of pleural effusion.  [facility] are [facility] at capacity today but [place] reports they  will call us when a bed is available.     Ms Pickens is up in the wheelchair at present and denies SOB at this time.  She continues to have edema but it has not worsened.      Dr. Grover agrees that as long as she is stable and O2 sat is wnl, she can wait for a room to become available.  We will continue to monitor patient.

## 2021-12-04 NOTE — SUBJECTIVE & OBJECTIVE
Interval History: 1 day    Review of Systems   Constitutional: Positive for fatigue. Negative for appetite change and fever.   Respiratory: Positive for cough, shortness of breath and wheezing.    Cardiovascular: Negative for chest pain.   Skin: Negative for color change.   Neurological: Negative for dizziness.   Psychiatric/Behavioral: Positive for confusion.     Objective:     Vital Signs (Most Recent):  Temp: 97.6 °F (36.4 °C) (12/03/21 2026)  Pulse: 60 (12/04/21 0731)  Resp: 20 (12/04/21 0731)  BP: (!) 159/65 (12/03/21 2026)  SpO2: (!) 94 % (12/04/21 1100) Vital Signs (24h Range):  Temp:  [97.6 °F (36.4 °C)] 97.6 °F (36.4 °C)  Pulse:  [58-83] 60  Resp:  [20-21] 20  SpO2:  [94 %-99 %] 94 %  BP: (140-159)/(65-67) 159/65     Weight: 84.1 kg (185 lb 6.5 oz)  Body mass index is 33.91 kg/m².    Intake/Output Summary (Last 24 hours) at 12/4/2021 1248  Last data filed at 12/4/2021 0537  Gross per 24 hour   Intake 3347.92 ml   Output 200 ml   Net 3147.92 ml      Physical Exam  Constitutional:       General: She is awake.      Appearance: She is obese. She is ill-appearing.   HENT:      Head:        Mouth/Throat:      Mouth: Mucous membranes are moist. Oral lesions present.      Pharynx: Posterior oropharyngeal erythema present.      Comments: Noted erythematous tongue and whitish lesions to bilateral bucal  Neck:      Comments: Noted edema to face and neck  Cardiovascular:      Rate and Rhythm: Normal rate and regular rhythm.   Pulmonary:      Effort: Pulmonary effort is normal. No respiratory distress.      Breath sounds: Examination of the right-lower field reveals decreased breath sounds and rales. Examination of the left-lower field reveals decreased breath sounds and rales. Decreased breath sounds, wheezing and rales present.      Comments: Bronchial wheeze,   Mild rales to bases, diminished to bases.  Abdominal:      Palpations: Abdomen is soft.      Tenderness: There is no abdominal tenderness.   Musculoskeletal:       Cervical back: Normal range of motion.      Right lower leg: Edema present.      Left lower leg: Edema present.   Skin:     General: Skin is warm and dry.      Coloration: Skin is not pale.   Neurological:      Mental Status: She is alert.   Psychiatric:         Mood and Affect: Mood normal.         Behavior: Behavior is cooperative.         Significant Labs: All pertinent labs within the past 24 hours have been reviewed.    Significant Imaging: I have reviewed all pertinent imaging results/findings within the past 24 hours.

## 2021-12-05 LAB
ANION GAP SERPL CALCULATED.3IONS-SCNC: 15 MMOL/L (ref 7–16)
ANION GAP SERPL CALCULATED.3IONS-SCNC: 16 MMOL/L (ref 7–16)
BUN SERPL-MCNC: 11 MG/DL (ref 7–18)
BUN SERPL-MCNC: 11 MG/DL (ref 7–18)
BUN/CREAT SERPL: 13 (ref 6–20)
BUN/CREAT SERPL: 15 (ref 6–20)
CALCIUM SERPL-MCNC: 8.6 MG/DL (ref 8.5–10.1)
CALCIUM SERPL-MCNC: 8.7 MG/DL (ref 8.5–10.1)
CHLORIDE SERPL-SCNC: 84 MMOL/L (ref 98–107)
CHLORIDE SERPL-SCNC: 85 MMOL/L (ref 98–107)
CO2 SERPL-SCNC: 18 MMOL/L (ref 21–32)
CO2 SERPL-SCNC: 20 MMOL/L (ref 21–32)
CREAT SERPL-MCNC: 0.72 MG/DL (ref 0.55–1.02)
CREAT SERPL-MCNC: 0.82 MG/DL (ref 0.55–1.02)
GLUCOSE SERPL-MCNC: 95 MG/DL (ref 74–106)
GLUCOSE SERPL-MCNC: 99 MG/DL (ref 74–106)
POTASSIUM SERPL-SCNC: 4.1 MMOL/L (ref 3.5–5.1)
POTASSIUM SERPL-SCNC: 4.9 MMOL/L (ref 3.5–5.1)
SODIUM SERPL-SCNC: 114 MMOL/L (ref 136–145)
SODIUM SERPL-SCNC: 115 MMOL/L (ref 136–145)

## 2021-12-05 PROCEDURE — 63600175 PHARM REV CODE 636 W HCPCS: Performed by: HOSPITALIST

## 2021-12-05 PROCEDURE — 25000003 PHARM REV CODE 250: Performed by: NURSE PRACTITIONER

## 2021-12-05 PROCEDURE — 27000958

## 2021-12-05 PROCEDURE — 11000004 HC SNF PRIVATE

## 2021-12-05 PROCEDURE — 82310 ASSAY OF CALCIUM: CPT | Performed by: HOSPITALIST

## 2021-12-05 PROCEDURE — 99900035 HC TECH TIME PER 15 MIN (STAT)

## 2021-12-05 PROCEDURE — 94640 AIRWAY INHALATION TREATMENT: CPT

## 2021-12-05 PROCEDURE — 25000242 PHARM REV CODE 250 ALT 637 W/ HCPCS: Performed by: NURSE PRACTITIONER

## 2021-12-05 PROCEDURE — 27000221 HC OXYGEN, UP TO 24 HOURS

## 2021-12-05 PROCEDURE — 25000003 PHARM REV CODE 250: Performed by: HOSPITALIST

## 2021-12-05 PROCEDURE — 94761 N-INVAS EAR/PLS OXIMETRY MLT: CPT

## 2021-12-05 PROCEDURE — 80048 BASIC METABOLIC PNL TOTAL CA: CPT | Performed by: HOSPITALIST

## 2021-12-05 PROCEDURE — 36415 COLL VENOUS BLD VENIPUNCTURE: CPT | Performed by: HOSPITALIST

## 2021-12-05 RX ADMIN — HYDRALAZINE HYDROCHLORIDE 25 MG: 25 TABLET ORAL at 02:12

## 2021-12-05 RX ADMIN — NYSTATIN 500000 UNITS: 500000 SUSPENSION ORAL at 08:12

## 2021-12-05 RX ADMIN — HYDRALAZINE HYDROCHLORIDE 25 MG: 25 TABLET ORAL at 08:12

## 2021-12-05 RX ADMIN — SENNOSIDES AND DOCUSATE SODIUM 1 TABLET: 8.6; 5 TABLET ORAL at 08:12

## 2021-12-05 RX ADMIN — IPRATROPIUM BROMIDE AND ALBUTEROL SULFATE 3 ML: .5; 3 SOLUTION RESPIRATORY (INHALATION) at 07:12

## 2021-12-05 RX ADMIN — ROPINIROLE 2 MG: 1 TABLET, FILM COATED ORAL at 08:12

## 2021-12-05 RX ADMIN — ANTACID TABLETS 500 MG: 500 TABLET, CHEWABLE ORAL at 06:12

## 2021-12-05 RX ADMIN — BUDESONIDE 0.5 MG: 0.5 INHALANT ORAL at 07:12

## 2021-12-05 RX ADMIN — ROPINIROLE 2 MG: 1 TABLET, FILM COATED ORAL at 02:12

## 2021-12-05 RX ADMIN — ENOXAPARIN SODIUM 30 MG: 30 INJECTION SUBCUTANEOUS at 08:12

## 2021-12-05 RX ADMIN — FAMOTIDINE 20 MG: 20 TABLET, FILM COATED ORAL at 09:12

## 2021-12-05 RX ADMIN — VENLAFAXINE HYDROCHLORIDE 75 MG: 75 CAPSULE, EXTENDED RELEASE ORAL at 08:12

## 2021-12-05 RX ADMIN — OXYBUTYNIN CHLORIDE 10 MG: 5 TABLET, EXTENDED RELEASE ORAL at 08:12

## 2021-12-05 RX ADMIN — ACETAMINOPHEN 650 MG: 325 TABLET ORAL at 02:12

## 2021-12-05 RX ADMIN — THYROID, PORCINE 15 MG: 15 TABLET ORAL at 05:12

## 2021-12-05 RX ADMIN — THYROID 90 MG: 90 TABLET ORAL at 05:12

## 2021-12-05 RX ADMIN — NYSTATIN 500000 UNITS: 500000 SUSPENSION ORAL at 11:12

## 2021-12-05 RX ADMIN — IPRATROPIUM BROMIDE AND ALBUTEROL SULFATE 3 ML: .5; 3 SOLUTION RESPIRATORY (INHALATION) at 02:12

## 2021-12-05 RX ADMIN — ZINC OXIDE TOPICAL OINT.: at 09:12

## 2021-12-05 RX ADMIN — ESTRADIOL 1 MG: 0.5 TABLET ORAL at 08:12

## 2021-12-05 RX ADMIN — NYSTATIN 500000 UNITS: 500000 SUSPENSION ORAL at 04:12

## 2021-12-05 RX ADMIN — NYSTATIN 500000 UNITS: 500000 SUSPENSION ORAL at 07:12

## 2021-12-06 ENCOUNTER — HOSPITAL ENCOUNTER (INPATIENT)
Facility: HOSPITAL | Age: 81
LOS: 9 days | Discharge: NURSING, REHAB OR LONG-TERM CARE | DRG: 641 | End: 2021-12-15
Attending: HOSPITALIST | Admitting: HOSPITALIST
Payer: MEDICARE

## 2021-12-06 VITALS
BODY MASS INDEX: 34.29 KG/M2 | OXYGEN SATURATION: 97 % | HEART RATE: 64 BPM | DIASTOLIC BLOOD PRESSURE: 78 MMHG | TEMPERATURE: 98 F | WEIGHT: 186.31 LBS | RESPIRATION RATE: 22 BRPM | SYSTOLIC BLOOD PRESSURE: 149 MMHG | HEIGHT: 62 IN

## 2021-12-06 DIAGNOSIS — I48.91 ATRIAL FIBRILLATION: ICD-10-CM

## 2021-12-06 DIAGNOSIS — R01.1 HEART MURMUR: ICD-10-CM

## 2021-12-06 DIAGNOSIS — R13.10 DYSPHAGIA, UNSPECIFIED TYPE: Primary | ICD-10-CM

## 2021-12-06 DIAGNOSIS — I10 ESSENTIAL HYPERTENSION: Chronic | ICD-10-CM

## 2021-12-06 DIAGNOSIS — S32.2XXD CLOSED FRACTURE OF COCCYX WITH ROUTINE HEALING, SUBSEQUENT ENCOUNTER: ICD-10-CM

## 2021-12-06 DIAGNOSIS — I48.0 PAROXYSMAL ATRIAL FIBRILLATION: ICD-10-CM

## 2021-12-06 DIAGNOSIS — R62.7 FAILURE TO THRIVE IN ADULT: ICD-10-CM

## 2021-12-06 DIAGNOSIS — E03.9 HYPOTHYROIDISM, UNSPECIFIED TYPE: Chronic | ICD-10-CM

## 2021-12-06 DIAGNOSIS — I50.813 ACUTE ON CHRONIC RIGHT-SIDED HEART FAILURE: ICD-10-CM

## 2021-12-06 DIAGNOSIS — E87.1 HYPONATREMIA: ICD-10-CM

## 2021-12-06 DIAGNOSIS — G47.33 OSA (OBSTRUCTIVE SLEEP APNEA): ICD-10-CM

## 2021-12-06 DIAGNOSIS — I35.0 AORTIC VALVE STENOSIS, ETIOLOGY OF CARDIAC VALVE DISEASE UNSPECIFIED: ICD-10-CM

## 2021-12-06 DIAGNOSIS — I27.20 PULMONARY HTN: ICD-10-CM

## 2021-12-06 DIAGNOSIS — I49.9 ARRHYTHMIA: ICD-10-CM

## 2021-12-06 DIAGNOSIS — R07.9 CHEST PAIN: ICD-10-CM

## 2021-12-06 PROBLEM — E87.6 HYPOKALEMIA: Status: RESOLVED | Noted: 2021-11-24 | Resolved: 2021-12-06

## 2021-12-06 PROBLEM — S32.2XXA FRACTURE OF COCCYX: Status: ACTIVE | Noted: 2021-12-06

## 2021-12-06 PROBLEM — B37.0 ORAL PHARYNGEAL CANDIDIASIS: Status: ACTIVE | Noted: 2021-12-06

## 2021-12-06 LAB
ANION GAP SERPL CALCULATED.3IONS-SCNC: 14 MMOL/L (ref 7–16)
ANION GAP SERPL CALCULATED.3IONS-SCNC: 15 MMOL/L (ref 7–16)
BUN SERPL-MCNC: 10 MG/DL (ref 7–18)
BUN SERPL-MCNC: 9 MG/DL (ref 7–18)
BUN/CREAT SERPL: 11 (ref 6–20)
BUN/CREAT SERPL: 14 (ref 6–20)
CALCIUM SERPL-MCNC: 8.7 MG/DL (ref 8.5–10.1)
CALCIUM SERPL-MCNC: 8.9 MG/DL (ref 8.5–10.1)
CHLORIDE SERPL-SCNC: 83 MMOL/L (ref 98–107)
CHLORIDE SERPL-SCNC: 84 MMOL/L (ref 98–107)
CO2 SERPL-SCNC: 17 MMOL/L (ref 21–32)
CO2 SERPL-SCNC: 20 MMOL/L (ref 21–32)
CREAT SERPL-MCNC: 0.7 MG/DL (ref 0.55–1.02)
CREAT SERPL-MCNC: 0.79 MG/DL (ref 0.55–1.02)
GLUCOSE SERPL-MCNC: 102 MG/DL (ref 74–106)
GLUCOSE SERPL-MCNC: 108 MG/DL (ref 74–106)
POTASSIUM SERPL-SCNC: 4.3 MMOL/L (ref 3.5–5.1)
POTASSIUM SERPL-SCNC: 5 MMOL/L (ref 3.5–5.1)
SODIUM SERPL-SCNC: 112 MMOL/L (ref 136–145)
SODIUM SERPL-SCNC: 112 MMOL/L (ref 136–145)

## 2021-12-06 PROCEDURE — 99309 SBSQ NF CARE MODERATE MDM 30: CPT | Mod: ,,, | Performed by: HOSPITALIST

## 2021-12-06 PROCEDURE — 27000221 HC OXYGEN, UP TO 24 HOURS

## 2021-12-06 PROCEDURE — 25000242 PHARM REV CODE 250 ALT 637 W/ HCPCS: Performed by: NURSE PRACTITIONER

## 2021-12-06 PROCEDURE — 25000003 PHARM REV CODE 250: Performed by: NURSE PRACTITIONER

## 2021-12-06 PROCEDURE — 94640 AIRWAY INHALATION TREATMENT: CPT

## 2021-12-06 PROCEDURE — 99900035 HC TECH TIME PER 15 MIN (STAT)

## 2021-12-06 PROCEDURE — 80048 BASIC METABOLIC PNL TOTAL CA: CPT | Performed by: HOSPITALIST

## 2021-12-06 PROCEDURE — 36415 COLL VENOUS BLD VENIPUNCTURE: CPT | Performed by: HOSPITALIST

## 2021-12-06 PROCEDURE — 27000958

## 2021-12-06 PROCEDURE — 63600175 PHARM REV CODE 636 W HCPCS: Performed by: NURSE PRACTITIONER

## 2021-12-06 PROCEDURE — 97530 THERAPEUTIC ACTIVITIES: CPT

## 2021-12-06 PROCEDURE — 11000001 HC ACUTE MED/SURG PRIVATE ROOM

## 2021-12-06 PROCEDURE — 99309 PR NURSING FAC CARE, SUBSEQ, SIGNIF COMPLIC: ICD-10-PCS | Mod: ,,, | Performed by: HOSPITALIST

## 2021-12-06 PROCEDURE — 94761 N-INVAS EAR/PLS OXIMETRY MLT: CPT

## 2021-12-06 PROCEDURE — 25000003 PHARM REV CODE 250: Performed by: HOSPITALIST

## 2021-12-06 PROCEDURE — 97110 THERAPEUTIC EXERCISES: CPT

## 2021-12-06 PROCEDURE — 63600175 PHARM REV CODE 636 W HCPCS: Performed by: HOSPITALIST

## 2021-12-06 RX ORDER — FUROSEMIDE 10 MG/ML
40 INJECTION INTRAMUSCULAR; INTRAVENOUS ONCE
Status: COMPLETED | OUTPATIENT
Start: 2021-12-06 | End: 2021-12-06

## 2021-12-06 RX ORDER — AMLODIPINE BESYLATE 5 MG/1
5 TABLET ORAL DAILY
Status: DISCONTINUED | OUTPATIENT
Start: 2021-12-07 | End: 2021-12-06

## 2021-12-06 RX ORDER — ENOXAPARIN SODIUM 100 MG/ML
40 INJECTION SUBCUTANEOUS EVERY 24 HOURS
Status: DISCONTINUED | OUTPATIENT
Start: 2021-12-06 | End: 2021-12-06 | Stop reason: HOSPADM

## 2021-12-06 RX ORDER — NYSTATIN 100000 [USP'U]/ML
500000 SUSPENSION ORAL 4 TIMES DAILY
Status: COMPLETED | OUTPATIENT
Start: 2021-12-07 | End: 2021-12-09

## 2021-12-06 RX ORDER — FLUCONAZOLE 2 MG/ML
100 INJECTION, SOLUTION INTRAVENOUS
Status: DISCONTINUED | OUTPATIENT
Start: 2021-12-06 | End: 2021-12-06

## 2021-12-06 RX ORDER — NYSTATIN 100000 [USP'U]/ML
500000 SUSPENSION ORAL 4 TIMES DAILY
Status: DISCONTINUED | OUTPATIENT
Start: 2021-12-06 | End: 2021-12-06

## 2021-12-06 RX ORDER — CELECOXIB 100 MG/1
200 CAPSULE ORAL 2 TIMES DAILY
Status: DISCONTINUED | OUTPATIENT
Start: 2021-12-06 | End: 2021-12-07

## 2021-12-06 RX ORDER — NALOXONE HCL 0.4 MG/ML
0.02 VIAL (ML) INJECTION
Status: DISCONTINUED | OUTPATIENT
Start: 2021-12-06 | End: 2021-12-15 | Stop reason: HOSPADM

## 2021-12-06 RX ORDER — ACETAMINOPHEN 500 MG
1000 TABLET ORAL EVERY 8 HOURS PRN
Status: DISCONTINUED | OUTPATIENT
Start: 2021-12-06 | End: 2021-12-15 | Stop reason: HOSPADM

## 2021-12-06 RX ORDER — ONDANSETRON 2 MG/ML
4 INJECTION INTRAMUSCULAR; INTRAVENOUS EVERY 8 HOURS PRN
Status: DISCONTINUED | OUTPATIENT
Start: 2021-12-06 | End: 2021-12-07

## 2021-12-06 RX ORDER — GLUCAGON 1 MG
1 KIT INJECTION
Status: DISCONTINUED | OUTPATIENT
Start: 2021-12-06 | End: 2021-12-15 | Stop reason: HOSPADM

## 2021-12-06 RX ORDER — IPRATROPIUM BROMIDE AND ALBUTEROL SULFATE 2.5; .5 MG/3ML; MG/3ML
3 SOLUTION RESPIRATORY (INHALATION) EVERY 6 HOURS PRN
Status: DISCONTINUED | OUTPATIENT
Start: 2021-12-06 | End: 2021-12-15 | Stop reason: HOSPADM

## 2021-12-06 RX ORDER — SIMETHICONE 80 MG
1 TABLET,CHEWABLE ORAL 4 TIMES DAILY PRN
Status: DISCONTINUED | OUTPATIENT
Start: 2021-12-06 | End: 2021-12-15 | Stop reason: HOSPADM

## 2021-12-06 RX ORDER — POLYETHYLENE GLYCOL 3350 17 G/17G
17 POWDER, FOR SOLUTION ORAL DAILY
Status: DISCONTINUED | OUTPATIENT
Start: 2021-12-07 | End: 2021-12-15 | Stop reason: HOSPADM

## 2021-12-06 RX ORDER — POTASSIUM CHLORIDE 20 MEQ/1
20 TABLET, EXTENDED RELEASE ORAL DAILY
Status: DISCONTINUED | OUTPATIENT
Start: 2021-12-07 | End: 2021-12-11

## 2021-12-06 RX ORDER — ALUMINUM HYDROXIDE, MAGNESIUM HYDROXIDE, AND SIMETHICONE 2400; 240; 2400 MG/30ML; MG/30ML; MG/30ML
30 SUSPENSION ORAL 4 TIMES DAILY PRN
Status: DISCONTINUED | OUTPATIENT
Start: 2021-12-06 | End: 2021-12-07

## 2021-12-06 RX ORDER — ROPINIROLE 2 MG/1
2 TABLET, FILM COATED ORAL 3 TIMES DAILY
Status: DISCONTINUED | OUTPATIENT
Start: 2021-12-07 | End: 2021-12-15 | Stop reason: HOSPADM

## 2021-12-06 RX ORDER — ENOXAPARIN SODIUM 100 MG/ML
40 INJECTION SUBCUTANEOUS EVERY 24 HOURS
Status: DISCONTINUED | OUTPATIENT
Start: 2021-12-06 | End: 2021-12-15 | Stop reason: HOSPADM

## 2021-12-06 RX ORDER — VENLAFAXINE HYDROCHLORIDE 75 MG/1
150 CAPSULE, EXTENDED RELEASE ORAL DAILY
Status: DISCONTINUED | OUTPATIENT
Start: 2021-12-07 | End: 2021-12-15 | Stop reason: HOSPADM

## 2021-12-06 RX ORDER — LOSARTAN POTASSIUM 50 MG/1
50 TABLET ORAL DAILY
Status: DISCONTINUED | OUTPATIENT
Start: 2021-12-07 | End: 2021-12-11

## 2021-12-06 RX ORDER — SODIUM CHLORIDE 0.9 % (FLUSH) 0.9 %
10 SYRINGE (ML) INJECTION EVERY 12 HOURS PRN
Status: DISCONTINUED | OUTPATIENT
Start: 2021-12-06 | End: 2021-12-15 | Stop reason: HOSPADM

## 2021-12-06 RX ORDER — THYROID 90 MG/1
TABLET ORAL
Status: DISCONTINUED | OUTPATIENT
Start: 2021-12-07 | End: 2021-12-07

## 2021-12-06 RX ADMIN — HYDRALAZINE HYDROCHLORIDE 25 MG: 25 TABLET ORAL at 08:12

## 2021-12-06 RX ADMIN — OXYBUTYNIN CHLORIDE 10 MG: 5 TABLET, EXTENDED RELEASE ORAL at 08:12

## 2021-12-06 RX ADMIN — ESTRADIOL 1 MG: 0.5 TABLET ORAL at 08:12

## 2021-12-06 RX ADMIN — NYSTATIN 500000 UNITS: 500000 SUSPENSION ORAL at 11:12

## 2021-12-06 RX ADMIN — ENOXAPARIN SODIUM 40 MG: 40 INJECTION SUBCUTANEOUS at 10:12

## 2021-12-06 RX ADMIN — ROPINIROLE 2 MG: 1 TABLET, FILM COATED ORAL at 08:12

## 2021-12-06 RX ADMIN — BUDESONIDE 0.5 MG: 0.5 INHALANT ORAL at 09:12

## 2021-12-06 RX ADMIN — FAMOTIDINE 20 MG: 20 TABLET, FILM COATED ORAL at 09:12

## 2021-12-06 RX ADMIN — THYROID 90 MG: 90 TABLET ORAL at 06:12

## 2021-12-06 RX ADMIN — IPRATROPIUM BROMIDE AND ALBUTEROL SULFATE 3 ML: .5; 3 SOLUTION RESPIRATORY (INHALATION) at 01:12

## 2021-12-06 RX ADMIN — THYROID, PORCINE 15 MG: 15 TABLET ORAL at 06:12

## 2021-12-06 RX ADMIN — NYSTATIN 500000 UNITS: 500000 SUSPENSION ORAL at 07:12

## 2021-12-06 RX ADMIN — NYSTATIN 500000 UNITS: 500000 SUSPENSION ORAL at 04:12

## 2021-12-06 RX ADMIN — IPRATROPIUM BROMIDE AND ALBUTEROL SULFATE 3 ML: .5; 3 SOLUTION RESPIRATORY (INHALATION) at 09:12

## 2021-12-06 RX ADMIN — VENLAFAXINE HYDROCHLORIDE 75 MG: 75 CAPSULE, EXTENDED RELEASE ORAL at 08:12

## 2021-12-06 RX ADMIN — ZINC OXIDE TOPICAL OINT.: at 09:12

## 2021-12-06 RX ADMIN — HYDRALAZINE HYDROCHLORIDE 25 MG: 25 TABLET ORAL at 03:12

## 2021-12-06 RX ADMIN — FUROSEMIDE 40 MG: 10 INJECTION, SOLUTION INTRAMUSCULAR; INTRAVENOUS at 09:12

## 2021-12-06 RX ADMIN — ROPINIROLE 2 MG: 1 TABLET, FILM COATED ORAL at 03:12

## 2021-12-06 RX ADMIN — FUROSEMIDE 40 MG: 10 INJECTION, SOLUTION INTRAMUSCULAR; INTRAVENOUS at 08:12

## 2021-12-06 NOTE — PROGRESS NOTES
[facility] Medicine  Progress Note    Patient Name: Courtney Pickens  MRN: [id number]  Patient Class: IP- Swing   Admission Date: 11/19/2021  Length of Stay: 17 days  Attending Physician: Austin Valencia DO  Primary Care Provider: Primary Doctor No        Subjective:     Principal Problem:Hyponatremia        HPI:  80 yr old Female admitted for swing-bed following an admit at [facility] with ICU stay since 11/15 for acute heart failure, hypoxemic resp failure, acute renal failure, hyperkalemia, hyponatremia, lactic acidosis, sepsis and anemia.  She continues to take PO augmentin for pneumonia.  Ms Pickens has collins past medical hx of HTN, hypothyroidism, hyperlipidemia, and restless leg syndrome.  She is admitted for medical management and rehab.        Overview/Hospital Course:  11/24 Feels groggy this am but no SOB.  Wants to go on pass.  No major issues.   11/26 Patient and family stating patient doesn't seem to be improving and peripheral edema increasing. Review of patient chart indicates a 4 lb weight game over past 2 days. Potassium level down to 3.0. Potassium supplement was changed from tablet to liquid because patient states trouble swallowing tablet; however, nurse states patient does not always drink all her potassium supplement.  11/29 Audra RN reports Ms Pickens and her daughter have concerns today.  Ms Pickens was found resting in bed.  She reports of continued swelling in bilateral legs and thinks may be caused by her BP med being changed from irbesartan to amlodipine.  Also c/o feeling more weak every day. Telemed Consult via audio/ video with Dr Buck via audio/ video, she agrees with changing BP med to ARB and request lab today to include BNP, B12, Vitamin D and TSH.    11/30 asked to see patient for increased edema, inc SOB and O2 use  12/1 States she doesn't feel good, but no other details.  Weight about the same.  Good diuresis overnight.   12/2 Na level still  dropping despite fluid restrictions, breathing seems stable   12/03 called to room by JONE Evans LPN, pt reports she fell on bedside table. Pt was found sitting on side of bed when bed alarm activated. She has a bruise noted to the back of right upper arm with a 1 cm skin tear.  The would was cleaned and dressed.  She reports pain to upper arm. Will Xray to r/o fracture.     12/3 A couple of falls now.  Coccyx was fractured from fall on bottom, Arm xrays neg for fracture.   12/04 0730 pt with noted periorbital edema and edema to face and neck.  Reports SOB.  Dr. Valencia was notified by staff and has ordered CT.  Discussed care with Dr. Valencia who asked that we check CT results and consult with on-call MD  12/6 States her stomach is upset this am, breathing seems stable.  Weight is up and Na level is down again today.        Interval History: States her stomach is upset this am, breathing is stable    Review of Systems   Respiratory: Negative for shortness of breath.    Cardiovascular: Negative for chest pain.   Gastrointestinal: Positive for nausea. Negative for abdominal pain and vomiting.   Psychiatric/Behavioral: Negative for agitation and confusion.   All other systems reviewed and are negative.    Objective:     Vital Signs (Most Recent):  Temp: 97.8 °F (36.6 °C) (12/05/21 2015)  Pulse: (!) 55 (12/06/21 0925)  Resp: 20 (12/06/21 0925)  BP: (!) 147/79 (12/06/21 0835)  SpO2: 100 % (12/06/21 0925) Vital Signs (24h Range):  Temp:  [97.8 °F (36.6 °C)] 97.8 °F (36.6 °C)  Pulse:  [52-66] 55  Resp:  [18-24] 20  SpO2:  [95 %-100 %] 100 %  BP: (147-152)/(79-80) 147/79     Weight: 84.5 kg (186 lb 4.8 oz)  Body mass index is 34.07 kg/m².    Intake/Output Summary (Last 24 hours) at 12/6/2021 0955  Last data filed at 12/6/2021 0601  Gross per 24 hour   Intake 840 ml   Output 0 ml   Net 840 ml      Physical Exam  Vitals and nursing note reviewed.   Constitutional:       General: She is not in acute distress.     Appearance:  She is obese.   HENT:      Nose: No congestion.      Mouth/Throat:      Mouth: Mucous membranes are moist.   Eyes:      Extraocular Movements: Extraocular movements intact.      Conjunctiva/sclera: Conjunctivae normal.      Pupils: Pupils are equal, round, and reactive to light.   Cardiovascular:      Rate and Rhythm: Normal rate and regular rhythm.   Pulmonary:      Effort: Pulmonary effort is normal. No respiratory distress.      Breath sounds: No rales.      Comments: Diminished breath sounds bilaterally in bases.   Abdominal:      General: Abdomen is flat. Bowel sounds are normal. There is no distension.      Palpations: Abdomen is soft.      Tenderness: There is no abdominal tenderness.   Musculoskeletal:      Right lower leg: Edema present.      Left lower leg: Edema present.   Skin:     General: Skin is warm and dry.   Neurological:      General: No focal deficit present.      Mental Status: She is alert.      Comments: Alert today.    Psychiatric:         Mood and Affect: Mood normal.         Significant Labs:   All pertinent labs within the past 24 hours have been reviewed.  Recent Lab Results       12/06/21  0829   12/06/21  0051   12/05/21  1551        Anion Gap 15   14   16       BUN 9   10   11       BUN/CREAT RATIO 11   14   15       Calcium 8.9   8.7   8.6       Chloride 84   83   85       CO2 17   20   18       Creatinine 0.79   0.70   0.72       eGFR if non  74   86   83       Glucose 108   102   99       Potassium 4.3   5.0   4.9       Sodium 112   112   114             Significant Imaging: I have reviewed all pertinent imaging results/findings within the past 24 hours.      Assessment/Plan:      * Hyponatremia  Unsure of exact cause.  Have tried multiple treatments.  Na did not improve with fluid restriction, only came up a little with fluid resuscitation.  Will give a single dose of lasix today due to weight being up.  Overall appears to have a hypervolemic Hyponatremic picture.   Plan to try and transfer for higher level of care for Renal/Endocrine evaluation.      Pleural effusion  Likely needs tap.       Dysphagia  Changed to dysphagia diet and try to get MBS.      Acute renal insufficiency  Stable now.       Chronic right-sided heart failure  On O2 for Pulm HTN.       Pulmonary HTN  Reviewed Echo results.  RVSP 65.    Hypokalemia  Monitor and replace as needed.  On daily.       Essential hypertension  Restart on ARB as was on before admit to [facility]  Monitor VS    Pneumonia  Seems resolved.  Dealing with fluid now.       VTE Risk Mitigation (From admission, onward)         Ordered     enoxaparin injection 40 mg  Daily         12/06/21 0916     IP VTE LOW RISK PATIENT  Once         11/19/21 1549                Discharge Planning   DOREEN:      Code Status: Full Code   Is the patient medically ready for discharge?:     Reason for patient still in hospital (select all that apply): Patient trending condition, Laboratory test and Consult recommendations  Discharge Plan A: Home                  Austin Valencia DO  Department of Hospital Medicine   [facility]

## 2021-12-06 NOTE — DISCHARGE SUMMARY
[facility]  Discharge Note  Short Stay    * No surgery found *    OUTCOME: pt was admitted for swing bed following a stay at [facility] for pneumonia and sepsis.  She has hyponatremia that is unresolved and bilateral pleural effusions that need to be further evaluated by specialty.  She also has CHF and will likely need cardiology to follow.  She is being transferred to [facility] room 651    DISPOSITION: [facility] / Specialty room 140 to Dr. Grady    FINAL DIAGNOSIS:  Hyponatremia, bilateral pleural effusion, CHF    FOLLOWUP: will be decided at DC from [facility]    DISCHARGE INSTRUCTIONS:  No discharge procedures on file.

## 2021-12-06 NOTE — PROGRESS NOTES
Clinical Pharmacy Chart Review Note      Admit Date: 11/19/2021   LOS: 17 days       Courtney Pickens is a 81 y.o. female admitted to SNF for PT/OT after hospitalization for pneumonia    Active Hospital Problems    Diagnosis  POA    Pleural effusion [J90]  Unknown     Since patient is stable and O2 sat is ok, Plan to transfer to [facility] for possible tap, tomorrow or Monday, when a bed is available      Acute renal insufficiency [N28.9]  No    Dysphagia [R13.10]  Unknown    Pulmonary HTN [I27.20]  Yes    Chronic right-sided heart failure [I50.812]  Yes    Hyponatremia [E87.1]  Yes    Hypokalemia [E87.6]  Yes    Pneumonia [J18.9]  Yes     Continue augmentin   Add neb pulmicort, duo-neb and prn albuterol      Essential hypertension [I10]  Yes     Chronic     Continue home medication  Monitor VS      At high risk for venous thromboembolism (VTE) [Z91.89]  Yes     Lovenox        Resolved Hospital Problems    Diagnosis Date Resolved POA    *Chronic congestive heart failure [I50.9] 12/02/2021 Yes     Monitor lab  Monitor weight  Cardiac diet  Continue home medication         Review of patient's allergies indicates:   Allergen Reactions    Codeine     Lisinopril     Opioids - morphine analogues      Patient Active Problem List    Diagnosis Date Noted    Pleural effusion 12/04/2021    Acute renal insufficiency 12/03/2021    Dysphagia 12/03/2021    Pulmonary HTN 12/02/2021    Chronic right-sided heart failure 12/02/2021    Hyponatremia 12/01/2021    Hypokalemia 11/24/2021    Pneumonia 11/19/2021    Essential hypertension 11/19/2021    At high risk for venous thromboembolism (VTE) 11/19/2021       Scheduled Meds:    albuterol-ipratropium  3 mL Nebulization Q6H WAKE    budesonide  0.5 mg Nebulization Q12H    enoxaparin  40 mg Subcutaneous Daily    estradioL  1 mg Oral Daily    famotidine  20 mg Oral Daily    hydrALAZINE  25 mg Oral TID    nystatin  500,000 Units Oral QID (WM & HS)    oxybutynin  10  mg Oral Daily    rOPINIRole  2 mg Oral TID    thyroid (pork)  15 mg Oral Before breakfast    thyroid (pork)  90 mg Oral Before breakfast    venlafaxine  75 mg Oral Daily    zinc oxide   Topical (Top) BID     Continuous Infusions:   PRN Meds: acetaminophen, albuterol sulfate, calcium carbonate, diphenhydrAMINE, melatonin, ondansetron, senna-docusate 8.6-50 mg    OBJECTIVE:     Vital Signs (Last 24H)  Temp:  [97.8 °F (36.6 °C)]   Pulse:  [52-66]   Resp:  [18-24]   BP: (147-152)/(79-80)   SpO2:  [95 %-100 %]     Laboratory:  CBC:   Recent Labs   Lab 11/29/21  1022 12/03/21  0439 12/04/21  0202   WBC 3.71* 4.78 6.36   RBC 3.12* 3.24* 3.14*   HGB 9.5* 9.9* 9.7*   HCT 28.5* 28.3* 27.4*    404* 437*   MCV 91.3 87.3 87.3   MCH 30.4 30.6 30.9   MCHC 33.3 35.0 35.4     BMP:   Recent Labs   Lab 12/05/21  1551 12/06/21  0051 12/06/21  0829   GLU 99 102 108*   * 112* 112*   K 4.9 5.0 4.3   CL 85* 83* 84*   CO2 18* 20* 17*   BUN 11 10 9   CREATININE 0.72 0.70 0.79   CALCIUM 8.6 8.7 8.9     .    ASSESSMENT/PLAN:     ECrCl=84.5.  K=4.3.Weekly swing bed medication regimen review complete.  Will continue to monitor.    Sarthak Paris, Pharm. D.  Director of Pharmacy  [facility]  [phone]  [email or web address]

## 2021-12-06 NOTE — SUBJECTIVE & OBJECTIVE
Interval History: States her stomach is upset this am, breathing is stable    Review of Systems   Respiratory: Negative for shortness of breath.    Cardiovascular: Negative for chest pain.   Gastrointestinal: Positive for nausea. Negative for abdominal pain and vomiting.   Psychiatric/Behavioral: Negative for agitation and confusion.   All other systems reviewed and are negative.    Objective:     Vital Signs (Most Recent):  Temp: 97.8 °F (36.6 °C) (12/05/21 2015)  Pulse: (!) 55 (12/06/21 0925)  Resp: 20 (12/06/21 0925)  BP: (!) 147/79 (12/06/21 0835)  SpO2: 100 % (12/06/21 0925) Vital Signs (24h Range):  Temp:  [97.8 °F (36.6 °C)] 97.8 °F (36.6 °C)  Pulse:  [52-66] 55  Resp:  [18-24] 20  SpO2:  [95 %-100 %] 100 %  BP: (147-152)/(79-80) 147/79     Weight: 84.5 kg (186 lb 4.8 oz)  Body mass index is 34.07 kg/m².    Intake/Output Summary (Last 24 hours) at 12/6/2021 0955  Last data filed at 12/6/2021 0601  Gross per 24 hour   Intake 840 ml   Output 0 ml   Net 840 ml      Physical Exam  Vitals and nursing note reviewed.   Constitutional:       General: She is not in acute distress.     Appearance: She is obese.   HENT:      Nose: No congestion.      Mouth/Throat:      Mouth: Mucous membranes are moist.   Eyes:      Extraocular Movements: Extraocular movements intact.      Conjunctiva/sclera: Conjunctivae normal.      Pupils: Pupils are equal, round, and reactive to light.   Cardiovascular:      Rate and Rhythm: Normal rate and regular rhythm.   Pulmonary:      Effort: Pulmonary effort is normal. No respiratory distress.      Breath sounds: No rales.      Comments: Diminished breath sounds bilaterally in bases.   Abdominal:      General: Abdomen is flat. Bowel sounds are normal. There is no distension.      Palpations: Abdomen is soft.      Tenderness: There is no abdominal tenderness.   Musculoskeletal:      Right lower leg: Edema present.      Left lower leg: Edema present.   Skin:     General: Skin is warm and dry.    Neurological:      General: No focal deficit present.      Mental Status: She is alert.      Comments: Alert today.    Psychiatric:         Mood and Affect: Mood normal.         Significant Labs:   All pertinent labs within the past 24 hours have been reviewed.  Recent Lab Results       12/06/21  0829   12/06/21  0051   12/05/21  1551        Anion Gap 15   14   16       BUN 9   10   11       BUN/CREAT RATIO 11   14   15       Calcium 8.9   8.7   8.6       Chloride 84   83   85       CO2 17   20   18       Creatinine 0.79   0.70   0.72       eGFR if non  74   86   83       Glucose 108   102   99       Potassium 4.3   5.0   4.9       Sodium 112   112   114             Significant Imaging: I have reviewed all pertinent imaging results/findings within the past 24 hours.

## 2021-12-06 NOTE — SUBJECTIVE & OBJECTIVE
"Interval History: ***    Review of Systems   Constitutional: Positive for fatigue. Negative for appetite change and fever.   Respiratory: Positive for cough. Negative for shortness of breath and wheezing.    Cardiovascular: Positive for leg swelling. Negative for chest pain.   Gastrointestinal: Negative.    Genitourinary: Negative for difficulty urinating.   Musculoskeletal: Negative.    Skin: Negative for color change.   Neurological: Negative.      Objective:     Vital Signs (Most Recent):  Temp: 98.2 °F (36.8 °C) (12/06/21 1538)  Pulse: 64 (12/06/21 1538)  Resp: (!) 22 (12/06/21 1538)  BP: (!) 149/78 (12/06/21 1538)  SpO2: 97 % (12/06/21 1538) Vital Signs (24h Range):  Temp:  [97.6 °F (36.4 °C)-98.2 °F (36.8 °C)] 98.2 °F (36.8 °C)  Pulse:  [52-68] 64  Resp:  [18-24] 22  SpO2:  [97 %-100 %] 97 %  BP: (147-152)/(78-80) 149/78     Weight: 84.5 kg (186 lb 4.8 oz)  Body mass index is 34.07 kg/m².    Intake/Output Summary (Last 24 hours) at 12/6/2021 1612  Last data filed at 12/6/2021 1415  Gross per 24 hour   Intake 840 ml   Output 600 ml   Net 240 ml      Physical Exam  Vitals and nursing note reviewed.   Constitutional:       Appearance: She is obese. She is ill-appearing.   HENT:      Mouth/Throat:      Mouth: Mucous membranes are moist.   Cardiovascular:      Rate and Rhythm: Normal rate and regular rhythm.      Heart sounds: Normal heart sounds.   Pulmonary:      Effort: Pulmonary effort is normal. No respiratory distress.   Abdominal:      Palpations: Abdomen is soft.      Tenderness: There is no abdominal tenderness.   Musculoskeletal:      Cervical back: Neck supple.      Right lower leg: Edema present.      Left lower leg: Edema present.   Skin:     General: Skin is warm and dry.   Neurological:      Mental Status: She is alert.         Significant Labs: {Results:31847::"All pertinent labs within the past 24 hours have been reviewed."}    Significant Imaging: {Imaging Review:22646::"I have reviewed all " "pertinent imaging results/findings within the past 24 hours."}  "

## 2021-12-06 NOTE — NURSING
Room Change on Pt. Tried to call report to Specialty x3. x2 with no answer. The last try someone answered and hung up.

## 2021-12-06 NOTE — PROGRESS NOTES
Pt appeared with much increased edema today, supine and sleeping upon OT entrance to check on her.  PT already attempted visit this morning, but pt had to return for bathroom needs.  Nursing reports pt would have been transferred over weekend but no beds were available.  Nursing report they are awaiting bed opening and possibly transferring pt for procedure to manage edema. Family aware and being notified by nursing.  No OT tx today because of this ongoing issue of exacerbated CHF.

## 2021-12-06 NOTE — PROGRESS NOTES
Pharmacist Renal Dose Adjustment Note    Courtney Pickens is a 81 y.o. female being treated with the medication enoxaparin    Patient Data:    Vital Signs (Most Recent):  Temp: 97.8 °F (36.6 °C) (12/05/21 2015)  Pulse: 66 (12/05/21 2015)  Resp: (!) 24 (12/05/21 2015)  BP: (!) 147/79 (12/06/21 0835)  SpO2: 98 % (12/05/21 2015)   Vital Signs (72h Range):  Temp:  [97.3 °F (36.3 °C)-97.8 °F (36.6 °C)]   Pulse:  [53-83]   Resp:  [16-24]   BP: (140-159)/(51-80)   SpO2:  [94 %-100 %]      Recent Labs   Lab 12/05/21  0625 12/05/21  1551 12/06/21  0051   CREATININE 0.82 0.72 0.70     Serum creatinine: 0.7 mg/dL 12/06/21 0051  Estimated creatinine clearance: 64.7 mL/min    Medication:enoxaparin dose: 30mg frequency daily will be changed to medication:enoxaparin dose:40mg frequency:daily    Pharmacist's Name: Sarthak Paris  Pharmacist's Extension: [phone]

## 2021-12-06 NOTE — ASSESSMENT & PLAN NOTE
Unsure of exact cause.  Have tried multiple treatments.  Na did not improve with fluid restriction, only came up a little with fluid resuscitation.  Will give a single dose of lasix today due to weight being up.  Overall appears to have a hypervolemic Hyponatremic picture.  Plan to try and transfer for higher level of care for Renal/Endocrine evaluation.      Transfer to [facility] for further treatment

## 2021-12-06 NOTE — PT/OT/SLP PROGRESS
Physical Therapy  Treatment    Courtney Pickens   MRN: [id number]   Admitting Diagnosis: Hyponatremia    PT Received On: 12/06/21  PT Start Time: 0930     PT Stop Time: 1000    PT Total Time (min): 30 min       Billable Minutes:  Therapeutic Activity 11 and Therapeutic Exercise 15    Treatment Type: Evaluation,Treatment  PT/PTA: PT            General Precautions: Standard, fall  Orthopedic Precautions: N/A     Subjective:    Patient agreeable to participate in therapy and without sig c/o.         Objective:        Functional Mobility:  Bed Mobility:  I       Transfers: sit<>stand with supervision.         Balance:   Static Sit: GOOD: Takes MODERATE challenges from all directions  Dynamic Sit: GOOD: Maintains balance through MODERATE excursions of active trunk movement  Static Stand: GOOD: Takes MODERATE challenges from all directions  Dynamic stand: GOOD: Needs SUPERVISION only during gait and able to self right with moderate LOB       Therapeutic Activities and Exercises:  Exercises 03632        LAQ    Hip ADD/ABD    Bike 15'   Nustep    sit <>stand            Activities 45014            transfer training x3 sit stand, and bed<>wc       Gait      O2 administered throughout PT session.  Patient reported needing to use the restroom and having to prep for transport to MD appointment.      Patient left up in chair with call button in reach.    Assessment:  Courtney Pickens is a 81 y.o. female with a medical diagnosis of Hyponatremia and presents with mild weakness and decreased tolerance to activity.    Rehab identified problem list/impairments: Rehab identified problem list/impairments: weakness,impaired endurance,gait instability,impaired balance    Rehab potential is good.    Activity tolerance: Fair    Discharge recommendations: Discharge Facility/Level of Care Needs: home     Barriers to discharge:      Equipment recommendations: Equipment Needed After Discharge: none     GOALS:   Multidisciplinary Problems      Physical Therapy Goals        Problem: Physical Therapy Goal    Goal Priority Disciplines Outcome Goal Variances Interventions   Physical Therapy Goal     PT, PT/OT Ongoing, Progressing     Description:   Goals     Short Term Goals  2 weeks    1) I HOME EXERCISE PROGRAM  2) I in room transfers  3) Gait 500 feet with no device  and No assistance  4) I Wheel Chair skills    Long Term Goals  4 weeks    1) Discharge to home  2) tolerate 45 mins of activity  3) up/down 10 steps I with rail                      PLAN:    Patient to be seen 5 x/week  to address the above listed problems via    Plan of Care expires: 12/24/21  Plan of Care reviewed with: patient         12/06/2021

## 2021-12-06 NOTE — ASSESSMENT & PLAN NOTE
Unsure of exact cause.  Have tried multiple treatments.  Na did not improve with fluid restriction, only came up a little with fluid resuscitation.  Will give a single dose of lasix today due to weight being up.  Overall appears to have a hypervolemic Hyponatremic picture.  Plan to try and transfer for higher level of care for Renal/Endocrine evaluation.

## 2021-12-06 NOTE — NURSING
[facility] called to transport to [facility]. Report called to Antoni at [address]. Family at bedside with pt.

## 2021-12-06 NOTE — NURSING TRANSFER
Nursing Transfer Note      12/6/2021     Reason patient is being transferred: Hyponatremia    Transfer from [facility] to [facility]     Transfer via     Transfer with oxygen and INT    Transported by EMS    Medicines sent: none    Any special needs or follow-up needed: no    Chart send with patient: YES     Notified: family     Patient reassessed at: 12/6/2021 1637    Upon arrival to floor:

## 2021-12-07 PROBLEM — R62.7 FAILURE TO THRIVE IN ADULT: Status: ACTIVE | Noted: 2021-12-07

## 2021-12-07 PROBLEM — I48.91 ATRIAL FIBRILLATION: Status: ACTIVE | Noted: 2021-12-07

## 2021-12-07 LAB
ALBUMIN SERPL BCP-MCNC: 2.9 G/DL (ref 3.5–5)
ALBUMIN/GLOB SERPL: 0.7 {RATIO}
ALP SERPL-CCNC: 65 U/L (ref 55–142)
ALT SERPL W P-5'-P-CCNC: 43 U/L (ref 13–56)
ANION GAP SERPL CALCULATED.3IONS-SCNC: 13 MMOL/L (ref 7–16)
AORTIC ROOT ANNULUS: 2.6 CM
AORTIC VALVE CUSP SEPERATION: 1.83 CM
APTT PPP: 36.5 SECONDS (ref 25.2–37.3)
AST SERPL W P-5'-P-CCNC: 83 U/L (ref 15–37)
AV INDEX (PROSTH): 0.4
AV MEAN GRADIENT: 20 MMHG
AV PEAK GRADIENT: 44 MMHG
AV REGURGITATION PRESSURE HALF TIME: 560 MS
AV VALVE AREA: 0.71 CM2
AV VELOCITY RATIO: 0.33
BACTERIA #/AREA URNS HPF: ABNORMAL /HPF
BASOPHILS # BLD AUTO: 0.04 K/UL (ref 0–0.2)
BASOPHILS NFR BLD AUTO: 0.9 % (ref 0–1)
BILIRUB SERPL-MCNC: 1.1 MG/DL (ref 0–1.2)
BILIRUB UR QL STRIP: NEGATIVE
BSA FOR ECHO PROCEDURE: 1.92 M2
BUN SERPL-MCNC: 7 MG/DL (ref 7–18)
BUN/CREAT SERPL: 11 (ref 6–20)
CALCIUM SERPL-MCNC: 8.4 MG/DL (ref 8.5–10.1)
CHLORIDE SERPL-SCNC: 89 MMOL/L (ref 98–107)
CLARITY UR: CLEAR
CO2 SERPL-SCNC: 22 MMOL/L (ref 21–32)
COLOR UR: ABNORMAL
CREAT SERPL-MCNC: 0.66 MG/DL (ref 0.55–1.02)
CV ECHO LV RWT: 0.62 CM
DIFFERENTIAL METHOD BLD: ABNORMAL
DOP CALC AO PEAK VEL: 3.3 M/S
DOP CALC AO VTI: 57 CM
DOP CALC LVOT AREA: 1.8 CM2
DOP CALC LVOT DIAMETER: 1.5 CM
DOP CALC LVOT PEAK VEL: 1.1 M/S
DOP CALC LVOT STROKE VOLUME: 40.62 CM3
DOP CALCLVOT PEAK VEL VTI: 23 CM
E WAVE DECELERATION TIME: 267 MSEC
ECHO EF ESTIMATED: 75 %
ECHO LV POSTERIOR WALL: 1.2 CM (ref 0.6–1.1)
EJECTION FRACTION: 75 %
EOSINOPHIL # BLD AUTO: 0.01 K/UL (ref 0–0.5)
EOSINOPHIL NFR BLD AUTO: 0.2 % (ref 1–4)
ERYTHROCYTE [DISTWIDTH] IN BLOOD BY AUTOMATED COUNT: 13.4 % (ref 11.5–14.5)
FRACTIONAL SHORTENING: 48 % (ref 28–44)
GLOBULIN SER-MCNC: 4.3 G/DL (ref 2–4)
GLUCOSE SERPL-MCNC: 88 MG/DL (ref 74–106)
GLUCOSE UR STRIP-MCNC: NEGATIVE MG/DL
HCT VFR BLD AUTO: 30.9 % (ref 38–47)
HGB BLD-MCNC: 10.9 G/DL (ref 12–16)
IMM GRANULOCYTES # BLD AUTO: 0.1 K/UL (ref 0–0.04)
IMM GRANULOCYTES NFR BLD: 2.1 % (ref 0–0.4)
INR BLD: 0.92 (ref 0.9–1.1)
INTERVENTRICULAR SEPTUM: 1.2 CM (ref 0.6–1.1)
IVC OSTIUM: 1 CM
KETONES UR STRIP-SCNC: NEGATIVE MG/DL
LACTATE SERPL-SCNC: 1 MMOL/L (ref 0.4–2)
LEFT ATRIUM SIZE: 3.7 CM
LEFT INTERNAL DIMENSION IN SYSTOLE: 2 CM (ref 2.1–4)
LEFT VENTRICLE DIASTOLIC VOLUME INDEX: 34.54 ML/M2
LEFT VENTRICLE DIASTOLIC VOLUME: 63.9 ML
LEFT VENTRICLE MASS INDEX: 84 G/M2
LEFT VENTRICLE SYSTOLIC VOLUME INDEX: 7.8 ML/M2
LEFT VENTRICLE SYSTOLIC VOLUME: 14.4 ML
LEFT VENTRICULAR INTERNAL DIMENSION IN DIASTOLE: 3.85 CM (ref 3.5–6)
LEFT VENTRICULAR MASS: 156.25 G
LEUKOCYTE ESTERASE UR QL STRIP: ABNORMAL
LVOT MG: 3 MMHG
LYMPHOCYTES # BLD AUTO: 0.71 K/UL (ref 1–4.8)
LYMPHOCYTES NFR BLD AUTO: 15.2 % (ref 27–41)
MCH RBC QN AUTO: 29.8 PG (ref 27–31)
MCHC RBC AUTO-ENTMCNC: 35.3 G/DL (ref 32–36)
MCV RBC AUTO: 84.4 FL (ref 80–96)
MONOCYTES # BLD AUTO: 0.51 K/UL (ref 0–0.8)
MONOCYTES NFR BLD AUTO: 10.9 % (ref 2–6)
MPC BLD CALC-MCNC: 8.5 FL (ref 9.4–12.4)
MUCOUS THREADS #/AREA URNS HPF: ABNORMAL /HPF
MV PEAK E VEL: 1.64 M/S
NEUTROPHILS # BLD AUTO: 3.29 K/UL (ref 1.8–7.7)
NEUTROPHILS NFR BLD AUTO: 70.7 % (ref 53–65)
NITRITE UR QL STRIP: NEGATIVE
NRBC # BLD AUTO: 0 X10E3/UL
NRBC, AUTO (.00): 0 %
NT-PROBNP SERPL-MCNC: 3620 PG/ML (ref 1–450)
OSMOLALITY SERPL: 259 MOSM/KG (ref 280–301)
PH UR STRIP: 5.5 PH UNITS
PISA AR MAX VEL: 3.21 M/S
PISA TR MAX VEL: 4.1 M/S
PLATELET # BLD AUTO: 316 K/UL (ref 150–400)
POTASSIUM SERPL-SCNC: 3.7 MMOL/L (ref 3.5–5.1)
PREALB SERPL NEPH-MCNC: 13 MG/DL (ref 20–40)
PROT SERPL-MCNC: 7.2 G/DL (ref 6.4–8.2)
PROT UR QL STRIP: NEGATIVE
PROTHROMBIN TIME: 12.4 SECONDS (ref 11.7–14.7)
RA MAJOR: 3.1 CM
RA PRESSURE: 3 MMHG
RBC # BLD AUTO: 3.66 M/UL (ref 4.2–5.4)
RBC # UR STRIP: NEGATIVE /UL
RBC #/AREA URNS HPF: ABNORMAL /HPF
RIGHT VENTRICULAR END-DIASTOLIC DIMENSION: 4.3 CM
SODIUM SERPL-SCNC: 120 MMOL/L (ref 136–145)
SODIUM SERPL-SCNC: 123 MMOL/L (ref 136–145)
SODIUM SERPL-SCNC: 127 MMOL/L (ref 136–145)
SP GR UR STRIP: 1.01
SQUAMOUS #/AREA URNS LPF: ABNORMAL /LPF
TR MAX PG: 67 MMHG
TRICHOMONAS #/AREA URNS HPF: ABNORMAL /HPF
TRICUSPID ANNULAR PLANE SYSTOLIC EXCURSION: 1.8 CM
TV REST PULMONARY ARTERY PRESSURE: 70 MMHG
UROBILINOGEN UR STRIP-ACNC: 0.2 MG/DL
WBC # BLD AUTO: 4.66 K/UL (ref 4.5–11)
WBC #/AREA URNS HPF: ABNORMAL /HPF
YEAST #/AREA URNS HPF: ABNORMAL /HPF

## 2021-12-07 PROCEDURE — 87186 SC STD MICRODIL/AGAR DIL: CPT | Performed by: NURSE PRACTITIONER

## 2021-12-07 PROCEDURE — 63600175 PHARM REV CODE 636 W HCPCS: Performed by: NURSE PRACTITIONER

## 2021-12-07 PROCEDURE — 25500020 PHARM REV CODE 255: Performed by: HOSPITALIST

## 2021-12-07 PROCEDURE — 99900035 HC TECH TIME PER 15 MIN (STAT)

## 2021-12-07 PROCEDURE — 99223 PR INITIAL HOSPITAL CARE,LEVL III: ICD-10-PCS | Mod: ,,, | Performed by: HOSPITALIST

## 2021-12-07 PROCEDURE — 83605 ASSAY OF LACTIC ACID: CPT | Performed by: HOSPITALIST

## 2021-12-07 PROCEDURE — 85730 THROMBOPLASTIN TIME PARTIAL: CPT | Performed by: HOSPITALIST

## 2021-12-07 PROCEDURE — 27000221 HC OXYGEN, UP TO 24 HOURS

## 2021-12-07 PROCEDURE — 85610 PROTHROMBIN TIME: CPT | Performed by: HOSPITALIST

## 2021-12-07 PROCEDURE — 81001 URINALYSIS AUTO W/SCOPE: CPT | Performed by: NURSE PRACTITIONER

## 2021-12-07 PROCEDURE — 83930 ASSAY OF BLOOD OSMOLALITY: CPT | Performed by: NURSE PRACTITIONER

## 2021-12-07 PROCEDURE — 99900031 HC PATIENT EDUCATION (STAT)

## 2021-12-07 PROCEDURE — 81003 URINALYSIS AUTO W/O SCOPE: CPT | Performed by: NURSE PRACTITIONER

## 2021-12-07 PROCEDURE — 36415 COLL VENOUS BLD VENIPUNCTURE: CPT | Performed by: HOSPITALIST

## 2021-12-07 PROCEDURE — 80053 COMPREHEN METABOLIC PANEL: CPT | Performed by: HOSPITALIST

## 2021-12-07 PROCEDURE — 94761 N-INVAS EAR/PLS OXIMETRY MLT: CPT

## 2021-12-07 PROCEDURE — 99223 1ST HOSP IP/OBS HIGH 75: CPT | Mod: ,,, | Performed by: HOSPITALIST

## 2021-12-07 PROCEDURE — 11000001 HC ACUTE MED/SURG PRIVATE ROOM

## 2021-12-07 PROCEDURE — 87077 CULTURE AEROBIC IDENTIFY: CPT | Performed by: NURSE PRACTITIONER

## 2021-12-07 PROCEDURE — 97161 PT EVAL LOW COMPLEX 20 MIN: CPT

## 2021-12-07 PROCEDURE — 84295 ASSAY OF SERUM SODIUM: CPT | Performed by: HOSPITALIST

## 2021-12-07 PROCEDURE — 25000003 PHARM REV CODE 250: Performed by: NURSE PRACTITIONER

## 2021-12-07 PROCEDURE — 27000190 HC CPAP FULL FACE MASK W/VALVE

## 2021-12-07 PROCEDURE — 83880 ASSAY OF NATRIURETIC PEPTIDE: CPT | Performed by: NURSE PRACTITIONER

## 2021-12-07 PROCEDURE — 63600175 PHARM REV CODE 636 W HCPCS: Performed by: HOSPITALIST

## 2021-12-07 PROCEDURE — 25000003 PHARM REV CODE 250: Performed by: HOSPITALIST

## 2021-12-07 PROCEDURE — 84134 ASSAY OF PREALBUMIN: CPT | Performed by: HOSPITALIST

## 2021-12-07 PROCEDURE — 85025 COMPLETE CBC W/AUTO DIFF WBC: CPT | Performed by: NURSE PRACTITIONER

## 2021-12-07 PROCEDURE — 97166 OT EVAL MOD COMPLEX 45 MIN: CPT

## 2021-12-07 PROCEDURE — 94660 CPAP INITIATION&MGMT: CPT

## 2021-12-07 RX ORDER — ACETAMINOPHEN 500 MG
1000 TABLET ORAL EVERY 6 HOURS PRN
Status: DISCONTINUED | OUTPATIENT
Start: 2021-12-07 | End: 2021-12-15 | Stop reason: HOSPADM

## 2021-12-07 RX ORDER — 3% SODIUM CHLORIDE 3 G/100ML
50 INJECTION, SOLUTION INTRAVENOUS CONTINUOUS
Status: DISCONTINUED | OUTPATIENT
Start: 2021-12-07 | End: 2021-12-08

## 2021-12-07 RX ORDER — GUAIFENESIN/DEXTROMETHORPHAN 100-10MG/5
10 SYRUP ORAL EVERY 6 HOURS PRN
Status: DISCONTINUED | OUTPATIENT
Start: 2021-12-07 | End: 2021-12-15 | Stop reason: HOSPADM

## 2021-12-07 RX ORDER — FUROSEMIDE 10 MG/ML
40 INJECTION INTRAMUSCULAR; INTRAVENOUS EVERY 8 HOURS
Status: DISCONTINUED | OUTPATIENT
Start: 2021-12-07 | End: 2021-12-08

## 2021-12-07 RX ORDER — BISACODYL 5 MG
10 TABLET, DELAYED RELEASE (ENTERIC COATED) ORAL DAILY PRN
Status: DISCONTINUED | OUTPATIENT
Start: 2021-12-07 | End: 2021-12-15 | Stop reason: HOSPADM

## 2021-12-07 RX ORDER — SIMETHICONE 80 MG
1 TABLET,CHEWABLE ORAL 3 TIMES DAILY PRN
Status: DISCONTINUED | OUTPATIENT
Start: 2021-12-07 | End: 2021-12-07

## 2021-12-07 RX ORDER — TRAZODONE HYDROCHLORIDE 50 MG/1
50 TABLET ORAL NIGHTLY PRN
Status: DISCONTINUED | OUTPATIENT
Start: 2021-12-07 | End: 2021-12-15 | Stop reason: HOSPADM

## 2021-12-07 RX ORDER — LEVOTHYROXINE SODIUM 112 UG/1
112 TABLET ORAL
Status: DISCONTINUED | OUTPATIENT
Start: 2021-12-07 | End: 2021-12-15 | Stop reason: HOSPADM

## 2021-12-07 RX ORDER — ONDANSETRON 2 MG/ML
8 INJECTION INTRAMUSCULAR; INTRAVENOUS EVERY 6 HOURS PRN
Status: DISCONTINUED | OUTPATIENT
Start: 2021-12-07 | End: 2021-12-15 | Stop reason: HOSPADM

## 2021-12-07 RX ADMIN — ROPINIROLE 2 MG: 2 TABLET, FILM COATED ORAL at 09:12

## 2021-12-07 RX ADMIN — PERFLUTREN 1.5 ML: 6.52 INJECTION, SUSPENSION INTRAVENOUS at 08:12

## 2021-12-07 RX ADMIN — ROPINIROLE 2 MG: 2 TABLET, FILM COATED ORAL at 02:12

## 2021-12-07 RX ADMIN — LEVOTHYROXINE SODIUM 112 MCG: 0.11 TABLET ORAL at 05:12

## 2021-12-07 RX ADMIN — NYSTATIN 500000 UNITS: 100000 SUSPENSION ORAL at 09:12

## 2021-12-07 RX ADMIN — NYSTATIN 500000 UNITS: 100000 SUSPENSION ORAL at 11:12

## 2021-12-07 RX ADMIN — ROPINIROLE 2 MG: 2 TABLET, FILM COATED ORAL at 11:12

## 2021-12-07 RX ADMIN — SODIUM CHLORIDE 50 ML/HR: 3 INJECTION, SOLUTION INTRAVENOUS at 01:12

## 2021-12-07 RX ADMIN — VENLAFAXINE HYDROCHLORIDE 150 MG: 75 CAPSULE, EXTENDED RELEASE ORAL at 11:12

## 2021-12-07 RX ADMIN — NYSTATIN 500000 UNITS: 100000 SUSPENSION ORAL at 06:12

## 2021-12-07 RX ADMIN — ENOXAPARIN SODIUM 40 MG: 40 INJECTION SUBCUTANEOUS at 06:12

## 2021-12-07 RX ADMIN — SODIUM CHLORIDE 50 ML/HR: 3 INJECTION, SOLUTION INTRAVENOUS at 02:12

## 2021-12-07 RX ADMIN — FUROSEMIDE 40 MG: 10 INJECTION, SOLUTION INTRAMUSCULAR; INTRAVENOUS at 06:12

## 2021-12-07 RX ADMIN — LOSARTAN POTASSIUM 50 MG: 50 TABLET, FILM COATED ORAL at 11:12

## 2021-12-07 RX ADMIN — NYSTATIN 500000 UNITS: 100000 SUSPENSION ORAL at 02:12

## 2021-12-07 RX ADMIN — POTASSIUM CHLORIDE 10 MEQ: 20 TABLET, EXTENDED RELEASE ORAL at 11:12

## 2021-12-07 RX ADMIN — FUROSEMIDE 40 MG: 10 INJECTION, SOLUTION INTRAMUSCULAR; INTRAVENOUS at 05:12

## 2021-12-08 PROBLEM — G47.33 OSA (OBSTRUCTIVE SLEEP APNEA): Status: ACTIVE | Noted: 2021-12-08

## 2021-12-08 LAB
ANION GAP SERPL CALCULATED.3IONS-SCNC: 11 MMOL/L (ref 7–16)
BUN SERPL-MCNC: 4 MG/DL (ref 7–18)
BUN/CREAT SERPL: 5 (ref 6–20)
CALCIUM SERPL-MCNC: 8.1 MG/DL (ref 8.5–10.1)
CHLORIDE SERPL-SCNC: 98 MMOL/L (ref 98–107)
CO2 SERPL-SCNC: 26 MMOL/L (ref 21–32)
CREAT SERPL-MCNC: 0.88 MG/DL (ref 0.55–1.02)
GLUCOSE SERPL-MCNC: 100 MG/DL (ref 74–106)
GLUCOSE SERPL-MCNC: 114 MG/DL (ref 70–105)
MAGNESIUM SERPL-MCNC: 1.6 MG/DL (ref 1.7–2.3)
POTASSIUM SERPL-SCNC: 3 MMOL/L (ref 3.5–5.1)
SODIUM SERPL-SCNC: 129 MMOL/L (ref 136–145)
SODIUM SERPL-SCNC: 131 MMOL/L (ref 136–145)
SODIUM SERPL-SCNC: 132 MMOL/L (ref 136–145)
SODIUM SERPL-SCNC: 132 MMOL/L (ref 136–145)
SODIUM SERPL-SCNC: 133 MMOL/L (ref 136–145)

## 2021-12-08 PROCEDURE — 99233 SBSQ HOSP IP/OBS HIGH 50: CPT | Mod: ,,, | Performed by: HOSPITALIST

## 2021-12-08 PROCEDURE — 93010 ELECTROCARDIOGRAM REPORT: CPT | Mod: ,,, | Performed by: INTERNAL MEDICINE

## 2021-12-08 PROCEDURE — 99900031 HC PATIENT EDUCATION (STAT)

## 2021-12-08 PROCEDURE — 83735 ASSAY OF MAGNESIUM: CPT | Performed by: HOSPITALIST

## 2021-12-08 PROCEDURE — 93005 ELECTROCARDIOGRAM TRACING: CPT

## 2021-12-08 PROCEDURE — 82962 GLUCOSE BLOOD TEST: CPT

## 2021-12-08 PROCEDURE — 63600175 PHARM REV CODE 636 W HCPCS: Performed by: NURSE PRACTITIONER

## 2021-12-08 PROCEDURE — 36415 COLL VENOUS BLD VENIPUNCTURE: CPT | Performed by: HOSPITALIST

## 2021-12-08 PROCEDURE — 97530 THERAPEUTIC ACTIVITIES: CPT

## 2021-12-08 PROCEDURE — 94660 CPAP INITIATION&MGMT: CPT

## 2021-12-08 PROCEDURE — 80048 BASIC METABOLIC PNL TOTAL CA: CPT | Performed by: HOSPITALIST

## 2021-12-08 PROCEDURE — 93010 EKG 12-LEAD: ICD-10-PCS | Mod: ,,, | Performed by: INTERNAL MEDICINE

## 2021-12-08 PROCEDURE — 25000003 PHARM REV CODE 250: Performed by: HOSPITALIST

## 2021-12-08 PROCEDURE — 25000003 PHARM REV CODE 250: Performed by: NURSE PRACTITIONER

## 2021-12-08 PROCEDURE — 97116 GAIT TRAINING THERAPY: CPT

## 2021-12-08 PROCEDURE — 27000221 HC OXYGEN, UP TO 24 HOURS

## 2021-12-08 PROCEDURE — 99900035 HC TECH TIME PER 15 MIN (STAT)

## 2021-12-08 PROCEDURE — 99233 PR SUBSEQUENT HOSPITAL CARE,LEVL III: ICD-10-PCS | Mod: ,,, | Performed by: HOSPITALIST

## 2021-12-08 PROCEDURE — 63600175 PHARM REV CODE 636 W HCPCS: Performed by: HOSPITALIST

## 2021-12-08 PROCEDURE — 11000001 HC ACUTE MED/SURG PRIVATE ROOM

## 2021-12-08 PROCEDURE — 84295 ASSAY OF SERUM SODIUM: CPT | Performed by: HOSPITALIST

## 2021-12-08 PROCEDURE — 97110 THERAPEUTIC EXERCISES: CPT | Mod: CO

## 2021-12-08 RX ORDER — FUROSEMIDE 10 MG/ML
40 INJECTION INTRAMUSCULAR; INTRAVENOUS
Status: DISCONTINUED | OUTPATIENT
Start: 2021-12-08 | End: 2021-12-14

## 2021-12-08 RX ADMIN — ROPINIROLE 2 MG: 2 TABLET, FILM COATED ORAL at 02:12

## 2021-12-08 RX ADMIN — LOSARTAN POTASSIUM 50 MG: 50 TABLET, FILM COATED ORAL at 08:12

## 2021-12-08 RX ADMIN — NYSTATIN 500000 UNITS: 100000 SUSPENSION ORAL at 09:12

## 2021-12-08 RX ADMIN — SODIUM CHLORIDE 50 ML/HR: 3 INJECTION, SOLUTION INTRAVENOUS at 04:12

## 2021-12-08 RX ADMIN — ROPINIROLE 2 MG: 2 TABLET, FILM COATED ORAL at 08:12

## 2021-12-08 RX ADMIN — VENLAFAXINE HYDROCHLORIDE 150 MG: 75 CAPSULE, EXTENDED RELEASE ORAL at 08:12

## 2021-12-08 RX ADMIN — LEVOTHYROXINE SODIUM 112 MCG: 0.11 TABLET ORAL at 05:12

## 2021-12-08 RX ADMIN — FUROSEMIDE 40 MG: 10 INJECTION, SOLUTION INTRAMUSCULAR; INTRAVENOUS at 08:12

## 2021-12-08 RX ADMIN — NYSTATIN 500000 UNITS: 100000 SUSPENSION ORAL at 08:12

## 2021-12-08 RX ADMIN — POTASSIUM CHLORIDE 20 MEQ: 20 TABLET, EXTENDED RELEASE ORAL at 08:12

## 2021-12-08 RX ADMIN — NYSTATIN 500000 UNITS: 100000 SUSPENSION ORAL at 01:12

## 2021-12-08 RX ADMIN — FUROSEMIDE 40 MG: 10 INJECTION, SOLUTION INTRAMUSCULAR; INTRAVENOUS at 04:12

## 2021-12-08 RX ADMIN — ENOXAPARIN SODIUM 40 MG: 40 INJECTION SUBCUTANEOUS at 04:12

## 2021-12-08 RX ADMIN — NYSTATIN 500000 UNITS: 100000 SUSPENSION ORAL at 04:12

## 2021-12-08 RX ADMIN — POLYETHYLENE GLYCOL 3350 17 G: 17 POWDER, FOR SOLUTION ORAL at 08:12

## 2021-12-08 RX ADMIN — ROPINIROLE 2 MG: 2 TABLET, FILM COATED ORAL at 09:12

## 2021-12-09 PROBLEM — I35.0 AORTIC STENOSIS: Status: ACTIVE | Noted: 2021-12-09

## 2021-12-09 PROBLEM — I50.810 RIGHT HEART FAILURE: Status: ACTIVE | Noted: 2021-12-02

## 2021-12-09 LAB
ANION GAP SERPL CALCULATED.3IONS-SCNC: 10 MMOL/L (ref 7–16)
BASOPHILS # BLD AUTO: 0.03 K/UL (ref 0–0.2)
BASOPHILS NFR BLD AUTO: 0.5 % (ref 0–1)
BUN SERPL-MCNC: 6 MG/DL (ref 7–18)
BUN/CREAT SERPL: 7 (ref 6–20)
CALCIUM SERPL-MCNC: 8.3 MG/DL (ref 8.5–10.1)
CHLORIDE SERPL-SCNC: 97 MMOL/L (ref 98–107)
CO2 SERPL-SCNC: 31 MMOL/L (ref 21–32)
CREAT SERPL-MCNC: 0.87 MG/DL (ref 0.55–1.02)
DIFFERENTIAL METHOD BLD: ABNORMAL
EOSINOPHIL # BLD AUTO: 0 K/UL (ref 0–0.5)
EOSINOPHIL NFR BLD AUTO: 0 % (ref 1–4)
ERYTHROCYTE [DISTWIDTH] IN BLOOD BY AUTOMATED COUNT: 13.7 % (ref 11.5–14.5)
GLUCOSE SERPL-MCNC: 123 MG/DL (ref 74–106)
HCT VFR BLD AUTO: 27.2 % (ref 38–47)
HGB BLD-MCNC: 9.5 G/DL (ref 12–16)
IMM GRANULOCYTES # BLD AUTO: 0.06 K/UL (ref 0–0.04)
IMM GRANULOCYTES NFR BLD: 1 % (ref 0–0.4)
LYMPHOCYTES # BLD AUTO: 1.02 K/UL (ref 1–4.8)
LYMPHOCYTES NFR BLD AUTO: 17.3 % (ref 27–41)
MAGNESIUM SERPL-MCNC: 1.6 MG/DL (ref 1.7–2.3)
MCH RBC QN AUTO: 30.3 PG (ref 27–31)
MCHC RBC AUTO-ENTMCNC: 34.9 G/DL (ref 32–36)
MCV RBC AUTO: 86.6 FL (ref 80–96)
MONOCYTES # BLD AUTO: 0.81 K/UL (ref 0–0.8)
MONOCYTES NFR BLD AUTO: 13.8 % (ref 2–6)
MPC BLD CALC-MCNC: 8.5 FL (ref 9.4–12.4)
NEUTROPHILS # BLD AUTO: 3.96 K/UL (ref 1.8–7.7)
NEUTROPHILS NFR BLD AUTO: 67.4 % (ref 53–65)
NRBC # BLD AUTO: 0 X10E3/UL
NRBC, AUTO (.00): 0 %
PLATELET # BLD AUTO: 219 K/UL (ref 150–400)
POTASSIUM SERPL-SCNC: 2.9 MMOL/L (ref 3.5–5.1)
RBC # BLD AUTO: 3.14 M/UL (ref 4.2–5.4)
SODIUM SERPL-SCNC: 132 MMOL/L (ref 136–145)
SODIUM SERPL-SCNC: 133 MMOL/L (ref 136–145)
SODIUM SERPL-SCNC: 134 MMOL/L (ref 136–145)
SODIUM SERPL-SCNC: 135 MMOL/L (ref 136–145)
SODIUM SERPL-SCNC: 135 MMOL/L (ref 136–145)
UA COMPLETE W REFLEX CULTURE PNL UR: ABNORMAL
UA COMPLETE W REFLEX CULTURE PNL UR: ABNORMAL
WBC # BLD AUTO: 5.88 K/UL (ref 4.5–11)

## 2021-12-09 PROCEDURE — 99233 SBSQ HOSP IP/OBS HIGH 50: CPT | Mod: ,,, | Performed by: HOSPITALIST

## 2021-12-09 PROCEDURE — 85025 COMPLETE CBC W/AUTO DIFF WBC: CPT | Performed by: HOSPITALIST

## 2021-12-09 PROCEDURE — 11000001 HC ACUTE MED/SURG PRIVATE ROOM

## 2021-12-09 PROCEDURE — 83735 ASSAY OF MAGNESIUM: CPT | Performed by: HOSPITALIST

## 2021-12-09 PROCEDURE — 36415 COLL VENOUS BLD VENIPUNCTURE: CPT | Performed by: HOSPITALIST

## 2021-12-09 PROCEDURE — 27000221 HC OXYGEN, UP TO 24 HOURS

## 2021-12-09 PROCEDURE — 63600175 PHARM REV CODE 636 W HCPCS: Performed by: NURSE PRACTITIONER

## 2021-12-09 PROCEDURE — 63600175 PHARM REV CODE 636 W HCPCS: Performed by: HOSPITALIST

## 2021-12-09 PROCEDURE — 94660 CPAP INITIATION&MGMT: CPT

## 2021-12-09 PROCEDURE — 84295 ASSAY OF SERUM SODIUM: CPT | Performed by: HOSPITALIST

## 2021-12-09 PROCEDURE — 25000003 PHARM REV CODE 250: Performed by: NURSE PRACTITIONER

## 2021-12-09 PROCEDURE — 97110 THERAPEUTIC EXERCISES: CPT | Mod: CQ

## 2021-12-09 PROCEDURE — 97116 GAIT TRAINING THERAPY: CPT | Mod: CQ

## 2021-12-09 PROCEDURE — 99233 PR SUBSEQUENT HOSPITAL CARE,LEVL III: ICD-10-PCS | Mod: ,,, | Performed by: HOSPITALIST

## 2021-12-09 PROCEDURE — 97535 SELF CARE MNGMENT TRAINING: CPT | Mod: CO

## 2021-12-09 PROCEDURE — 25000003 PHARM REV CODE 250: Performed by: HOSPITALIST

## 2021-12-09 PROCEDURE — 80048 BASIC METABOLIC PNL TOTAL CA: CPT | Performed by: HOSPITALIST

## 2021-12-09 PROCEDURE — 94761 N-INVAS EAR/PLS OXIMETRY MLT: CPT

## 2021-12-09 PROCEDURE — 99900035 HC TECH TIME PER 15 MIN (STAT)

## 2021-12-09 RX ORDER — POTASSIUM CHLORIDE 20 MEQ/1
40 TABLET, EXTENDED RELEASE ORAL ONCE
Status: COMPLETED | OUTPATIENT
Start: 2021-12-09 | End: 2021-12-09

## 2021-12-09 RX ORDER — POTASSIUM CHLORIDE 7.45 MG/ML
20 INJECTION INTRAVENOUS ONCE
Status: COMPLETED | OUTPATIENT
Start: 2021-12-10 | End: 2021-12-10

## 2021-12-09 RX ADMIN — ENOXAPARIN SODIUM 40 MG: 40 INJECTION SUBCUTANEOUS at 04:12

## 2021-12-09 RX ADMIN — LEVOTHYROXINE SODIUM 112 MCG: 0.11 TABLET ORAL at 06:12

## 2021-12-09 RX ADMIN — POTASSIUM CHLORIDE 20 MEQ: 20 TABLET, EXTENDED RELEASE ORAL at 09:12

## 2021-12-09 RX ADMIN — NYSTATIN 500000 UNITS: 100000 SUSPENSION ORAL at 12:12

## 2021-12-09 RX ADMIN — FUROSEMIDE 40 MG: 10 INJECTION, SOLUTION INTRAMUSCULAR; INTRAVENOUS at 06:12

## 2021-12-09 RX ADMIN — FUROSEMIDE 40 MG: 10 INJECTION, SOLUTION INTRAMUSCULAR; INTRAVENOUS at 04:12

## 2021-12-09 RX ADMIN — POLYETHYLENE GLYCOL 3350 17 G: 17 POWDER, FOR SOLUTION ORAL at 09:12

## 2021-12-09 RX ADMIN — LOSARTAN POTASSIUM 50 MG: 50 TABLET, FILM COATED ORAL at 09:12

## 2021-12-09 RX ADMIN — ROPINIROLE 2 MG: 2 TABLET, FILM COATED ORAL at 10:12

## 2021-12-09 RX ADMIN — ROPINIROLE 2 MG: 2 TABLET, FILM COATED ORAL at 03:12

## 2021-12-09 RX ADMIN — VENLAFAXINE HYDROCHLORIDE 150 MG: 75 CAPSULE, EXTENDED RELEASE ORAL at 09:12

## 2021-12-09 RX ADMIN — NYSTATIN 500000 UNITS: 100000 SUSPENSION ORAL at 10:12

## 2021-12-09 RX ADMIN — NYSTATIN 500000 UNITS: 100000 SUSPENSION ORAL at 04:12

## 2021-12-09 RX ADMIN — ROPINIROLE 2 MG: 2 TABLET, FILM COATED ORAL at 09:12

## 2021-12-09 RX ADMIN — POTASSIUM CHLORIDE 40 MEQ: 20 TABLET, EXTENDED RELEASE ORAL at 12:12

## 2021-12-09 RX ADMIN — NYSTATIN 500000 UNITS: 100000 SUSPENSION ORAL at 09:12

## 2021-12-10 ENCOUNTER — APPOINTMENT (OUTPATIENT)
Dept: RADIOLOGY | Facility: HOSPITAL | Age: 81
End: 2021-12-10
Attending: HOSPITALIST
Payer: MEDICARE

## 2021-12-10 LAB
ANION GAP SERPL CALCULATED.3IONS-SCNC: 10 MMOL/L (ref 7–16)
BASOPHILS # BLD AUTO: 0.04 K/UL (ref 0–0.2)
BASOPHILS NFR BLD AUTO: 0.6 % (ref 0–1)
BUN SERPL-MCNC: 8 MG/DL (ref 7–18)
BUN/CREAT SERPL: 9 (ref 6–20)
CALCIUM SERPL-MCNC: 8.4 MG/DL (ref 8.5–10.1)
CHLORIDE SERPL-SCNC: 94 MMOL/L (ref 98–107)
CO2 SERPL-SCNC: 34 MMOL/L (ref 21–32)
CREAT SERPL-MCNC: 0.88 MG/DL (ref 0.55–1.02)
DIFFERENTIAL METHOD BLD: ABNORMAL
EOSINOPHIL # BLD AUTO: 0.02 K/UL (ref 0–0.5)
EOSINOPHIL NFR BLD AUTO: 0.3 % (ref 1–4)
ERYTHROCYTE [DISTWIDTH] IN BLOOD BY AUTOMATED COUNT: 14.3 % (ref 11.5–14.5)
GLUCOSE SERPL-MCNC: 116 MG/DL (ref 74–106)
HCT VFR BLD AUTO: 29.4 % (ref 38–47)
HGB BLD-MCNC: 9.9 G/DL (ref 12–16)
IMM GRANULOCYTES # BLD AUTO: 0.05 K/UL (ref 0–0.04)
IMM GRANULOCYTES NFR BLD: 0.8 % (ref 0–0.4)
LYMPHOCYTES # BLD AUTO: 1.15 K/UL (ref 1–4.8)
LYMPHOCYTES NFR BLD AUTO: 17.3 % (ref 27–41)
MAGNESIUM SERPL-MCNC: 1.5 MG/DL (ref 1.7–2.3)
MCH RBC QN AUTO: 29.7 PG (ref 27–31)
MCHC RBC AUTO-ENTMCNC: 33.7 G/DL (ref 32–36)
MCV RBC AUTO: 88.3 FL (ref 80–96)
MONOCYTES # BLD AUTO: 0.71 K/UL (ref 0–0.8)
MONOCYTES NFR BLD AUTO: 10.7 % (ref 2–6)
MPC BLD CALC-MCNC: 8.8 FL (ref 9.4–12.4)
NEUTROPHILS # BLD AUTO: 4.67 K/UL (ref 1.8–7.7)
NEUTROPHILS NFR BLD AUTO: 70.3 % (ref 53–65)
NRBC # BLD AUTO: 0 X10E3/UL
NRBC, AUTO (.00): 0 %
PLATELET # BLD AUTO: 215 K/UL (ref 150–400)
POTASSIUM SERPL-SCNC: 3.5 MMOL/L (ref 3.5–5.1)
RBC # BLD AUTO: 3.33 M/UL (ref 4.2–5.4)
SODIUM SERPL-SCNC: 131 MMOL/L (ref 136–145)
SODIUM SERPL-SCNC: 131 MMOL/L (ref 136–145)
SODIUM SERPL-SCNC: 134 MMOL/L (ref 136–145)
SODIUM SERPL-SCNC: 135 MMOL/L (ref 136–145)
WBC # BLD AUTO: 6.64 K/UL (ref 4.5–11)

## 2021-12-10 PROCEDURE — 84295 ASSAY OF SERUM SODIUM: CPT | Performed by: HOSPITALIST

## 2021-12-10 PROCEDURE — 85025 COMPLETE CBC W/AUTO DIFF WBC: CPT | Performed by: HOSPITALIST

## 2021-12-10 PROCEDURE — 63600175 PHARM REV CODE 636 W HCPCS: Performed by: NURSE PRACTITIONER

## 2021-12-10 PROCEDURE — 25000003 PHARM REV CODE 250: Performed by: NURSE PRACTITIONER

## 2021-12-10 PROCEDURE — 99232 SBSQ HOSP IP/OBS MODERATE 35: CPT | Mod: ,,, | Performed by: HOSPITALIST

## 2021-12-10 PROCEDURE — 71045 X-RAY EXAM CHEST 1 VIEW: CPT | Mod: 26,,, | Performed by: STUDENT IN AN ORGANIZED HEALTH CARE EDUCATION/TRAINING PROGRAM

## 2021-12-10 PROCEDURE — 97110 THERAPEUTIC EXERCISES: CPT | Mod: CO

## 2021-12-10 PROCEDURE — 11000001 HC ACUTE MED/SURG PRIVATE ROOM

## 2021-12-10 PROCEDURE — 80048 BASIC METABOLIC PNL TOTAL CA: CPT | Performed by: HOSPITALIST

## 2021-12-10 PROCEDURE — 71045 X-RAY EXAM CHEST 1 VIEW: CPT | Mod: TC

## 2021-12-10 PROCEDURE — 99900035 HC TECH TIME PER 15 MIN (STAT)

## 2021-12-10 PROCEDURE — 27000221 HC OXYGEN, UP TO 24 HOURS

## 2021-12-10 PROCEDURE — 94660 CPAP INITIATION&MGMT: CPT

## 2021-12-10 PROCEDURE — 97116 GAIT TRAINING THERAPY: CPT | Mod: CQ

## 2021-12-10 PROCEDURE — 63600175 PHARM REV CODE 636 W HCPCS: Performed by: HOSPITALIST

## 2021-12-10 PROCEDURE — 25000003 PHARM REV CODE 250: Performed by: HOSPITALIST

## 2021-12-10 PROCEDURE — 36415 COLL VENOUS BLD VENIPUNCTURE: CPT | Performed by: HOSPITALIST

## 2021-12-10 PROCEDURE — 97110 THERAPEUTIC EXERCISES: CPT | Mod: CQ

## 2021-12-10 PROCEDURE — 99232 PR SUBSEQUENT HOSPITAL CARE,LEVL II: ICD-10-PCS | Mod: ,,, | Performed by: HOSPITALIST

## 2021-12-10 PROCEDURE — 25000003 PHARM REV CODE 250

## 2021-12-10 PROCEDURE — 94761 N-INVAS EAR/PLS OXIMETRY MLT: CPT

## 2021-12-10 PROCEDURE — 83735 ASSAY OF MAGNESIUM: CPT | Performed by: HOSPITALIST

## 2021-12-10 PROCEDURE — 71045 XR CHEST AP PORTABLE: ICD-10-PCS | Mod: 26,,, | Performed by: STUDENT IN AN ORGANIZED HEALTH CARE EDUCATION/TRAINING PROGRAM

## 2021-12-10 RX ORDER — SODIUM CHLORIDE 9 MG/ML
INJECTION, SOLUTION INTRAVENOUS
Status: COMPLETED
Start: 2021-12-10 | End: 2021-12-10

## 2021-12-10 RX ORDER — MAGNESIUM SULFATE HEPTAHYDRATE 40 MG/ML
2 INJECTION, SOLUTION INTRAVENOUS ONCE
Status: COMPLETED | OUTPATIENT
Start: 2021-12-10 | End: 2021-12-10

## 2021-12-10 RX ADMIN — ROPINIROLE 2 MG: 2 TABLET, FILM COATED ORAL at 09:12

## 2021-12-10 RX ADMIN — LEVOTHYROXINE SODIUM 112 MCG: 0.11 TABLET ORAL at 06:12

## 2021-12-10 RX ADMIN — POLYETHYLENE GLYCOL 3350 17 G: 17 POWDER, FOR SOLUTION ORAL at 08:12

## 2021-12-10 RX ADMIN — ROPINIROLE 2 MG: 2 TABLET, FILM COATED ORAL at 04:12

## 2021-12-10 RX ADMIN — ROPINIROLE 2 MG: 2 TABLET, FILM COATED ORAL at 08:12

## 2021-12-10 RX ADMIN — POTASSIUM CHLORIDE 20 MEQ: 20 TABLET, EXTENDED RELEASE ORAL at 08:12

## 2021-12-10 RX ADMIN — LOSARTAN POTASSIUM 50 MG: 50 TABLET, FILM COATED ORAL at 08:12

## 2021-12-10 RX ADMIN — ENOXAPARIN SODIUM 40 MG: 40 INJECTION SUBCUTANEOUS at 04:12

## 2021-12-10 RX ADMIN — POTASSIUM CHLORIDE 20 MEQ: 7.46 INJECTION, SOLUTION INTRAVENOUS at 12:12

## 2021-12-10 RX ADMIN — FUROSEMIDE 40 MG: 10 INJECTION, SOLUTION INTRAMUSCULAR; INTRAVENOUS at 04:12

## 2021-12-10 RX ADMIN — FUROSEMIDE 40 MG: 10 INJECTION, SOLUTION INTRAMUSCULAR; INTRAVENOUS at 06:12

## 2021-12-10 RX ADMIN — MAGNESIUM SULFATE HEPTAHYDRATE 2 G: 40 INJECTION, SOLUTION INTRAVENOUS at 01:12

## 2021-12-10 RX ADMIN — VENLAFAXINE HYDROCHLORIDE 150 MG: 75 CAPSULE, EXTENDED RELEASE ORAL at 08:12

## 2021-12-10 RX ADMIN — SODIUM CHLORIDE: 9 INJECTION, SOLUTION INTRAVENOUS at 01:12

## 2021-12-11 PROBLEM — B37.0 ORAL PHARYNGEAL CANDIDIASIS: Status: RESOLVED | Noted: 2021-12-06 | Resolved: 2021-12-11

## 2021-12-11 LAB
ANION GAP SERPL CALCULATED.3IONS-SCNC: 5 MMOL/L (ref 7–16)
BASOPHILS # BLD AUTO: 0.03 K/UL (ref 0–0.2)
BASOPHILS NFR BLD AUTO: 0.5 % (ref 0–1)
BUN SERPL-MCNC: 10 MG/DL (ref 7–18)
BUN/CREAT SERPL: 13 (ref 6–20)
CALCIUM SERPL-MCNC: 8.2 MG/DL (ref 8.5–10.1)
CHLORIDE SERPL-SCNC: 95 MMOL/L (ref 98–107)
CO2 SERPL-SCNC: 34 MMOL/L (ref 21–32)
CREAT SERPL-MCNC: 0.76 MG/DL (ref 0.55–1.02)
DIFFERENTIAL METHOD BLD: ABNORMAL
EOSINOPHIL # BLD AUTO: 0.05 K/UL (ref 0–0.5)
EOSINOPHIL NFR BLD AUTO: 0.9 % (ref 1–4)
ERYTHROCYTE [DISTWIDTH] IN BLOOD BY AUTOMATED COUNT: 14.3 % (ref 11.5–14.5)
GLUCOSE SERPL-MCNC: 91 MG/DL (ref 74–106)
HCT VFR BLD AUTO: 28 % (ref 38–47)
HGB BLD-MCNC: 9.2 G/DL (ref 12–16)
IMM GRANULOCYTES # BLD AUTO: 0.05 K/UL (ref 0–0.04)
IMM GRANULOCYTES NFR BLD: 0.9 % (ref 0–0.4)
LYMPHOCYTES # BLD AUTO: 1.33 K/UL (ref 1–4.8)
LYMPHOCYTES NFR BLD AUTO: 22.6 % (ref 27–41)
MAGNESIUM SERPL-MCNC: 1.9 MG/DL (ref 1.7–2.3)
MCH RBC QN AUTO: 29.7 PG (ref 27–31)
MCHC RBC AUTO-ENTMCNC: 32.9 G/DL (ref 32–36)
MCV RBC AUTO: 90.3 FL (ref 80–96)
MONOCYTES # BLD AUTO: 0.57 K/UL (ref 0–0.8)
MONOCYTES NFR BLD AUTO: 9.7 % (ref 2–6)
MPC BLD CALC-MCNC: 8.5 FL (ref 9.4–12.4)
NEUTROPHILS # BLD AUTO: 3.85 K/UL (ref 1.8–7.7)
NEUTROPHILS NFR BLD AUTO: 65.4 % (ref 53–65)
NRBC # BLD AUTO: 0 X10E3/UL
NRBC, AUTO (.00): 0 %
PLATELET # BLD AUTO: 193 K/UL (ref 150–400)
POTASSIUM SERPL-SCNC: 3.3 MMOL/L (ref 3.5–5.1)
RBC # BLD AUTO: 3.1 M/UL (ref 4.2–5.4)
SODIUM SERPL-SCNC: 131 MMOL/L (ref 136–145)
SODIUM SERPL-SCNC: 132 MMOL/L (ref 136–145)
WBC # BLD AUTO: 5.88 K/UL (ref 4.5–11)

## 2021-12-11 PROCEDURE — 83735 ASSAY OF MAGNESIUM: CPT | Performed by: HOSPITALIST

## 2021-12-11 PROCEDURE — 80048 BASIC METABOLIC PNL TOTAL CA: CPT | Performed by: HOSPITALIST

## 2021-12-11 PROCEDURE — 99233 PR SUBSEQUENT HOSPITAL CARE,LEVL III: ICD-10-PCS | Mod: ,,, | Performed by: STUDENT IN AN ORGANIZED HEALTH CARE EDUCATION/TRAINING PROGRAM

## 2021-12-11 PROCEDURE — 99233 SBSQ HOSP IP/OBS HIGH 50: CPT | Mod: ,,, | Performed by: STUDENT IN AN ORGANIZED HEALTH CARE EDUCATION/TRAINING PROGRAM

## 2021-12-11 PROCEDURE — 25000003 PHARM REV CODE 250: Performed by: STUDENT IN AN ORGANIZED HEALTH CARE EDUCATION/TRAINING PROGRAM

## 2021-12-11 PROCEDURE — 84295 ASSAY OF SERUM SODIUM: CPT | Performed by: HOSPITALIST

## 2021-12-11 PROCEDURE — 85025 COMPLETE CBC W/AUTO DIFF WBC: CPT | Performed by: HOSPITALIST

## 2021-12-11 PROCEDURE — 94761 N-INVAS EAR/PLS OXIMETRY MLT: CPT

## 2021-12-11 PROCEDURE — 25000003 PHARM REV CODE 250: Performed by: HOSPITALIST

## 2021-12-11 PROCEDURE — 63600175 PHARM REV CODE 636 W HCPCS: Performed by: NURSE PRACTITIONER

## 2021-12-11 PROCEDURE — 11000001 HC ACUTE MED/SURG PRIVATE ROOM

## 2021-12-11 PROCEDURE — 63600175 PHARM REV CODE 636 W HCPCS: Performed by: STUDENT IN AN ORGANIZED HEALTH CARE EDUCATION/TRAINING PROGRAM

## 2021-12-11 PROCEDURE — 99900035 HC TECH TIME PER 15 MIN (STAT)

## 2021-12-11 PROCEDURE — 36415 COLL VENOUS BLD VENIPUNCTURE: CPT | Performed by: HOSPITALIST

## 2021-12-11 PROCEDURE — 25000003 PHARM REV CODE 250: Performed by: NURSE PRACTITIONER

## 2021-12-11 PROCEDURE — 27000221 HC OXYGEN, UP TO 24 HOURS

## 2021-12-11 PROCEDURE — 63600175 PHARM REV CODE 636 W HCPCS: Performed by: HOSPITALIST

## 2021-12-11 RX ORDER — POTASSIUM CHLORIDE 20 MEQ/1
40 TABLET, EXTENDED RELEASE ORAL ONCE
Status: COMPLETED | OUTPATIENT
Start: 2021-12-11 | End: 2021-12-11

## 2021-12-11 RX ORDER — LOSARTAN POTASSIUM 50 MG/1
50 TABLET ORAL ONCE
Status: COMPLETED | OUTPATIENT
Start: 2021-12-11 | End: 2021-12-11

## 2021-12-11 RX ORDER — LOSARTAN POTASSIUM 100 MG/1
100 TABLET ORAL DAILY
Status: DISCONTINUED | OUTPATIENT
Start: 2021-12-12 | End: 2021-12-15 | Stop reason: HOSPADM

## 2021-12-11 RX ORDER — AMLODIPINE BESYLATE 10 MG/1
10 TABLET ORAL DAILY
Status: DISCONTINUED | OUTPATIENT
Start: 2021-12-11 | End: 2021-12-15 | Stop reason: HOSPADM

## 2021-12-11 RX ORDER — MAGNESIUM SULFATE HEPTAHYDRATE 40 MG/ML
2 INJECTION, SOLUTION INTRAVENOUS ONCE
Status: COMPLETED | OUTPATIENT
Start: 2021-12-11 | End: 2021-12-11

## 2021-12-11 RX ADMIN — POTASSIUM CHLORIDE 40 MEQ: 20 TABLET, EXTENDED RELEASE ORAL at 04:12

## 2021-12-11 RX ADMIN — LOSARTAN POTASSIUM 50 MG: 50 TABLET, FILM COATED ORAL at 12:12

## 2021-12-11 RX ADMIN — FUROSEMIDE 40 MG: 10 INJECTION, SOLUTION INTRAMUSCULAR; INTRAVENOUS at 06:12

## 2021-12-11 RX ADMIN — POTASSIUM CHLORIDE 20 MEQ: 20 TABLET, EXTENDED RELEASE ORAL at 08:12

## 2021-12-11 RX ADMIN — MAGNESIUM SULFATE HEPTAHYDRATE 2 G: 40 INJECTION, SOLUTION INTRAVENOUS at 04:12

## 2021-12-11 RX ADMIN — AMLODIPINE BESYLATE 10 MG: 10 TABLET ORAL at 06:12

## 2021-12-11 RX ADMIN — ROPINIROLE 2 MG: 2 TABLET, FILM COATED ORAL at 08:12

## 2021-12-11 RX ADMIN — ROPINIROLE 2 MG: 2 TABLET, FILM COATED ORAL at 02:12

## 2021-12-11 RX ADMIN — ENOXAPARIN SODIUM 40 MG: 40 INJECTION SUBCUTANEOUS at 04:12

## 2021-12-11 RX ADMIN — FUROSEMIDE 40 MG: 10 INJECTION, SOLUTION INTRAMUSCULAR; INTRAVENOUS at 04:12

## 2021-12-11 RX ADMIN — POLYETHYLENE GLYCOL 3350 17 G: 17 POWDER, FOR SOLUTION ORAL at 08:12

## 2021-12-11 RX ADMIN — LOSARTAN POTASSIUM 50 MG: 50 TABLET, FILM COATED ORAL at 08:12

## 2021-12-11 RX ADMIN — LEVOTHYROXINE SODIUM 112 MCG: 0.11 TABLET ORAL at 05:12

## 2021-12-11 RX ADMIN — VENLAFAXINE HYDROCHLORIDE 150 MG: 75 CAPSULE, EXTENDED RELEASE ORAL at 08:12

## 2021-12-11 RX ADMIN — ROPINIROLE 2 MG: 2 TABLET, FILM COATED ORAL at 09:12

## 2021-12-12 LAB
ALBUMIN SERPL BCP-MCNC: 2.4 G/DL (ref 3.5–5)
ALBUMIN/GLOB SERPL: 0.6 {RATIO}
ALP SERPL-CCNC: 55 U/L (ref 55–142)
ALT SERPL W P-5'-P-CCNC: 23 U/L (ref 13–56)
ANION GAP SERPL CALCULATED.3IONS-SCNC: 10 MMOL/L (ref 7–16)
AST SERPL W P-5'-P-CCNC: 50 U/L (ref 15–37)
BASOPHILS # BLD AUTO: 0.04 K/UL (ref 0–0.2)
BASOPHILS NFR BLD AUTO: 0.8 % (ref 0–1)
BILIRUB SERPL-MCNC: 1.1 MG/DL (ref 0–1.2)
BUN SERPL-MCNC: 9 MG/DL (ref 7–18)
BUN/CREAT SERPL: 13 (ref 6–20)
CALCIUM SERPL-MCNC: 8.7 MG/DL (ref 8.5–10.1)
CHLORIDE SERPL-SCNC: 94 MMOL/L (ref 98–107)
CO2 SERPL-SCNC: 32 MMOL/L (ref 21–32)
CREAT SERPL-MCNC: 0.72 MG/DL (ref 0.55–1.02)
DIFFERENTIAL METHOD BLD: ABNORMAL
EOSINOPHIL # BLD AUTO: 0.03 K/UL (ref 0–0.5)
EOSINOPHIL NFR BLD AUTO: 0.6 % (ref 1–4)
ERYTHROCYTE [DISTWIDTH] IN BLOOD BY AUTOMATED COUNT: 13.9 % (ref 11.5–14.5)
GLOBULIN SER-MCNC: 4.2 G/DL (ref 2–4)
GLUCOSE SERPL-MCNC: 86 MG/DL (ref 74–106)
HCT VFR BLD AUTO: 31.1 % (ref 38–47)
HGB BLD-MCNC: 9.9 G/DL (ref 12–16)
IMM GRANULOCYTES # BLD AUTO: 0.06 K/UL (ref 0–0.04)
IMM GRANULOCYTES NFR BLD: 1.2 % (ref 0–0.4)
LYMPHOCYTES # BLD AUTO: 1.24 K/UL (ref 1–4.8)
LYMPHOCYTES NFR BLD AUTO: 24.1 % (ref 27–41)
MAGNESIUM SERPL-MCNC: 2 MG/DL (ref 1.7–2.3)
MCH RBC QN AUTO: 29.4 PG (ref 27–31)
MCHC RBC AUTO-ENTMCNC: 31.8 G/DL (ref 32–36)
MCV RBC AUTO: 92.3 FL (ref 80–96)
MONOCYTES # BLD AUTO: 0.58 K/UL (ref 0–0.8)
MONOCYTES NFR BLD AUTO: 11.3 % (ref 2–6)
MPC BLD CALC-MCNC: 9 FL (ref 9.4–12.4)
NEUTROPHILS # BLD AUTO: 3.19 K/UL (ref 1.8–7.7)
NEUTROPHILS NFR BLD AUTO: 62 % (ref 53–65)
NRBC # BLD AUTO: 0 X10E3/UL
NRBC, AUTO (.00): 0 %
PLATELET # BLD AUTO: 218 K/UL (ref 150–400)
POTASSIUM SERPL-SCNC: 4.3 MMOL/L (ref 3.5–5.1)
PROT SERPL-MCNC: 6.6 G/DL (ref 6.4–8.2)
RBC # BLD AUTO: 3.37 M/UL (ref 4.2–5.4)
SODIUM SERPL-SCNC: 132 MMOL/L (ref 136–145)
WBC # BLD AUTO: 5.14 K/UL (ref 4.5–11)

## 2021-12-12 PROCEDURE — 36415 COLL VENOUS BLD VENIPUNCTURE: CPT | Performed by: STUDENT IN AN ORGANIZED HEALTH CARE EDUCATION/TRAINING PROGRAM

## 2021-12-12 PROCEDURE — 97535 SELF CARE MNGMENT TRAINING: CPT

## 2021-12-12 PROCEDURE — 83735 ASSAY OF MAGNESIUM: CPT | Performed by: HOSPITALIST

## 2021-12-12 PROCEDURE — 97110 THERAPEUTIC EXERCISES: CPT

## 2021-12-12 PROCEDURE — 80053 COMPREHEN METABOLIC PANEL: CPT | Performed by: STUDENT IN AN ORGANIZED HEALTH CARE EDUCATION/TRAINING PROGRAM

## 2021-12-12 PROCEDURE — 25000003 PHARM REV CODE 250: Performed by: NURSE PRACTITIONER

## 2021-12-12 PROCEDURE — 63600175 PHARM REV CODE 636 W HCPCS: Performed by: HOSPITALIST

## 2021-12-12 PROCEDURE — 99233 PR SUBSEQUENT HOSPITAL CARE,LEVL III: ICD-10-PCS | Mod: ,,, | Performed by: STUDENT IN AN ORGANIZED HEALTH CARE EDUCATION/TRAINING PROGRAM

## 2021-12-12 PROCEDURE — 63600175 PHARM REV CODE 636 W HCPCS: Performed by: NURSE PRACTITIONER

## 2021-12-12 PROCEDURE — 27000221 HC OXYGEN, UP TO 24 HOURS

## 2021-12-12 PROCEDURE — 25000003 PHARM REV CODE 250: Performed by: HOSPITALIST

## 2021-12-12 PROCEDURE — 85025 COMPLETE CBC W/AUTO DIFF WBC: CPT | Performed by: HOSPITALIST

## 2021-12-12 PROCEDURE — 99233 SBSQ HOSP IP/OBS HIGH 50: CPT | Mod: ,,, | Performed by: STUDENT IN AN ORGANIZED HEALTH CARE EDUCATION/TRAINING PROGRAM

## 2021-12-12 PROCEDURE — 11000001 HC ACUTE MED/SURG PRIVATE ROOM

## 2021-12-12 PROCEDURE — 25000003 PHARM REV CODE 250: Performed by: STUDENT IN AN ORGANIZED HEALTH CARE EDUCATION/TRAINING PROGRAM

## 2021-12-12 RX ORDER — CHLORTHALIDONE 25 MG/1
25 TABLET ORAL DAILY
Status: DISCONTINUED | OUTPATIENT
Start: 2021-12-12 | End: 2021-12-15 | Stop reason: HOSPADM

## 2021-12-12 RX ORDER — SPIRONOLACTONE 25 MG/1
25 TABLET ORAL DAILY
Status: DISCONTINUED | OUTPATIENT
Start: 2021-12-12 | End: 2021-12-15 | Stop reason: HOSPADM

## 2021-12-12 RX ADMIN — LOSARTAN POTASSIUM 100 MG: 100 TABLET, FILM COATED ORAL at 08:12

## 2021-12-12 RX ADMIN — AMLODIPINE BESYLATE 10 MG: 10 TABLET ORAL at 08:12

## 2021-12-12 RX ADMIN — ENOXAPARIN SODIUM 40 MG: 40 INJECTION SUBCUTANEOUS at 04:12

## 2021-12-12 RX ADMIN — ROPINIROLE 2 MG: 2 TABLET, FILM COATED ORAL at 03:12

## 2021-12-12 RX ADMIN — CHLORTHALIDONE 25 MG: 25 TABLET ORAL at 01:12

## 2021-12-12 RX ADMIN — ROPINIROLE 2 MG: 2 TABLET, FILM COATED ORAL at 08:12

## 2021-12-12 RX ADMIN — FUROSEMIDE 40 MG: 10 INJECTION, SOLUTION INTRAMUSCULAR; INTRAVENOUS at 06:12

## 2021-12-12 RX ADMIN — VENLAFAXINE HYDROCHLORIDE 150 MG: 75 CAPSULE, EXTENDED RELEASE ORAL at 08:12

## 2021-12-12 RX ADMIN — LEVOTHYROXINE SODIUM 112 MCG: 0.11 TABLET ORAL at 06:12

## 2021-12-12 RX ADMIN — SPIRONOLACTONE 25 MG: 25 TABLET ORAL at 08:12

## 2021-12-12 RX ADMIN — FUROSEMIDE 40 MG: 10 INJECTION, SOLUTION INTRAMUSCULAR; INTRAVENOUS at 04:12

## 2021-12-12 RX ADMIN — POLYETHYLENE GLYCOL 3350 17 G: 17 POWDER, FOR SOLUTION ORAL at 08:12

## 2021-12-12 RX ADMIN — ACETAMINOPHEN 1000 MG: 500 TABLET ORAL at 08:12

## 2021-12-13 LAB
ANION GAP SERPL CALCULATED.3IONS-SCNC: 7 MMOL/L (ref 7–16)
BASOPHILS # BLD AUTO: 0.04 K/UL (ref 0–0.2)
BASOPHILS NFR BLD AUTO: 0.8 % (ref 0–1)
BUN SERPL-MCNC: 8 MG/DL (ref 7–18)
BUN/CREAT SERPL: 10 (ref 6–20)
CALCIUM SERPL-MCNC: 8.9 MG/DL (ref 8.5–10.1)
CHLORIDE SERPL-SCNC: 91 MMOL/L (ref 98–107)
CO2 SERPL-SCNC: 36 MMOL/L (ref 21–32)
CREAT SERPL-MCNC: 0.8 MG/DL (ref 0.55–1.02)
DIFFERENTIAL METHOD BLD: ABNORMAL
EOSINOPHIL # BLD AUTO: 0.1 K/UL (ref 0–0.5)
EOSINOPHIL NFR BLD AUTO: 1.9 % (ref 1–4)
ERYTHROCYTE [DISTWIDTH] IN BLOOD BY AUTOMATED COUNT: 13.3 % (ref 11.5–14.5)
GLUCOSE SERPL-MCNC: 94 MG/DL (ref 74–106)
HCT VFR BLD AUTO: 30.1 % (ref 38–47)
HGB BLD-MCNC: 10.1 G/DL (ref 12–16)
IMM GRANULOCYTES # BLD AUTO: 0.07 K/UL (ref 0–0.04)
IMM GRANULOCYTES NFR BLD: 1.4 % (ref 0–0.4)
LYMPHOCYTES # BLD AUTO: 1.25 K/UL (ref 1–4.8)
LYMPHOCYTES NFR BLD AUTO: 24.2 % (ref 27–41)
MCH RBC QN AUTO: 29.3 PG (ref 27–31)
MCHC RBC AUTO-ENTMCNC: 33.6 G/DL (ref 32–36)
MCV RBC AUTO: 87.2 FL (ref 80–96)
MONOCYTES # BLD AUTO: 0.48 K/UL (ref 0–0.8)
MONOCYTES NFR BLD AUTO: 9.3 % (ref 2–6)
MPC BLD CALC-MCNC: 9.1 FL (ref 9.4–12.4)
NEUTROPHILS # BLD AUTO: 3.23 K/UL (ref 1.8–7.7)
NEUTROPHILS NFR BLD AUTO: 62.4 % (ref 53–65)
NRBC # BLD AUTO: 0 X10E3/UL
NRBC, AUTO (.00): 0 %
PLATELET # BLD AUTO: 215 K/UL (ref 150–400)
POTASSIUM SERPL-SCNC: 3.3 MMOL/L (ref 3.5–5.1)
RBC # BLD AUTO: 3.45 M/UL (ref 4.2–5.4)
SODIUM SERPL-SCNC: 131 MMOL/L (ref 136–145)
WBC # BLD AUTO: 5.17 K/UL (ref 4.5–11)

## 2021-12-13 PROCEDURE — 99232 SBSQ HOSP IP/OBS MODERATE 35: CPT | Mod: ,,, | Performed by: FAMILY MEDICINE

## 2021-12-13 PROCEDURE — 25000003 PHARM REV CODE 250: Performed by: STUDENT IN AN ORGANIZED HEALTH CARE EDUCATION/TRAINING PROGRAM

## 2021-12-13 PROCEDURE — 27000221 HC OXYGEN, UP TO 24 HOURS

## 2021-12-13 PROCEDURE — 97110 THERAPEUTIC EXERCISES: CPT | Mod: CQ

## 2021-12-13 PROCEDURE — 99900035 HC TECH TIME PER 15 MIN (STAT)

## 2021-12-13 PROCEDURE — 11000001 HC ACUTE MED/SURG PRIVATE ROOM

## 2021-12-13 PROCEDURE — 80048 BASIC METABOLIC PNL TOTAL CA: CPT | Performed by: STUDENT IN AN ORGANIZED HEALTH CARE EDUCATION/TRAINING PROGRAM

## 2021-12-13 PROCEDURE — 99232 PR SUBSEQUENT HOSPITAL CARE,LEVL II: ICD-10-PCS | Mod: ,,, | Performed by: FAMILY MEDICINE

## 2021-12-13 PROCEDURE — 36415 COLL VENOUS BLD VENIPUNCTURE: CPT | Performed by: HOSPITALIST

## 2021-12-13 PROCEDURE — 63600175 PHARM REV CODE 636 W HCPCS: Performed by: HOSPITALIST

## 2021-12-13 PROCEDURE — 94761 N-INVAS EAR/PLS OXIMETRY MLT: CPT

## 2021-12-13 PROCEDURE — 25000003 PHARM REV CODE 250: Performed by: NURSE PRACTITIONER

## 2021-12-13 PROCEDURE — 97110 THERAPEUTIC EXERCISES: CPT | Mod: CO

## 2021-12-13 PROCEDURE — 97116 GAIT TRAINING THERAPY: CPT | Mod: CQ

## 2021-12-13 PROCEDURE — 97535 SELF CARE MNGMENT TRAINING: CPT | Mod: CO

## 2021-12-13 PROCEDURE — 25000003 PHARM REV CODE 250: Performed by: HOSPITALIST

## 2021-12-13 PROCEDURE — 63600175 PHARM REV CODE 636 W HCPCS: Performed by: NURSE PRACTITIONER

## 2021-12-13 PROCEDURE — 85025 COMPLETE CBC W/AUTO DIFF WBC: CPT | Performed by: HOSPITALIST

## 2021-12-13 RX ADMIN — SPIRONOLACTONE 25 MG: 25 TABLET ORAL at 09:12

## 2021-12-13 RX ADMIN — POLYETHYLENE GLYCOL 3350 17 G: 17 POWDER, FOR SOLUTION ORAL at 09:12

## 2021-12-13 RX ADMIN — LOSARTAN POTASSIUM 100 MG: 100 TABLET, FILM COATED ORAL at 09:12

## 2021-12-13 RX ADMIN — ROPINIROLE 2 MG: 2 TABLET, FILM COATED ORAL at 08:12

## 2021-12-13 RX ADMIN — VENLAFAXINE HYDROCHLORIDE 150 MG: 75 CAPSULE, EXTENDED RELEASE ORAL at 09:12

## 2021-12-13 RX ADMIN — ACETAMINOPHEN 1000 MG: 500 TABLET ORAL at 08:12

## 2021-12-13 RX ADMIN — CHLORTHALIDONE 25 MG: 25 TABLET ORAL at 09:12

## 2021-12-13 RX ADMIN — ROPINIROLE 2 MG: 2 TABLET, FILM COATED ORAL at 03:12

## 2021-12-13 RX ADMIN — FUROSEMIDE 40 MG: 10 INJECTION, SOLUTION INTRAMUSCULAR; INTRAVENOUS at 06:12

## 2021-12-13 RX ADMIN — ENOXAPARIN SODIUM 40 MG: 40 INJECTION SUBCUTANEOUS at 04:12

## 2021-12-13 RX ADMIN — ROPINIROLE 2 MG: 2 TABLET, FILM COATED ORAL at 09:12

## 2021-12-13 RX ADMIN — LEVOTHYROXINE SODIUM 112 MCG: 0.11 TABLET ORAL at 06:12

## 2021-12-13 RX ADMIN — FUROSEMIDE 40 MG: 10 INJECTION, SOLUTION INTRAMUSCULAR; INTRAVENOUS at 03:12

## 2021-12-13 RX ADMIN — AMLODIPINE BESYLATE 10 MG: 10 TABLET ORAL at 09:12

## 2021-12-14 PROCEDURE — 99232 SBSQ HOSP IP/OBS MODERATE 35: CPT | Mod: ,,, | Performed by: FAMILY MEDICINE

## 2021-12-14 PROCEDURE — 25000003 PHARM REV CODE 250: Performed by: FAMILY MEDICINE

## 2021-12-14 PROCEDURE — 11000001 HC ACUTE MED/SURG PRIVATE ROOM

## 2021-12-14 PROCEDURE — 25000003 PHARM REV CODE 250: Performed by: STUDENT IN AN ORGANIZED HEALTH CARE EDUCATION/TRAINING PROGRAM

## 2021-12-14 PROCEDURE — 97110 THERAPEUTIC EXERCISES: CPT | Mod: CO

## 2021-12-14 PROCEDURE — 25000003 PHARM REV CODE 250: Performed by: NURSE PRACTITIONER

## 2021-12-14 PROCEDURE — 99232 PR SUBSEQUENT HOSPITAL CARE,LEVL II: ICD-10-PCS | Mod: ,,, | Performed by: FAMILY MEDICINE

## 2021-12-14 PROCEDURE — 97116 GAIT TRAINING THERAPY: CPT | Mod: CQ

## 2021-12-14 PROCEDURE — 99900035 HC TECH TIME PER 15 MIN (STAT)

## 2021-12-14 PROCEDURE — 94660 CPAP INITIATION&MGMT: CPT

## 2021-12-14 PROCEDURE — 63600175 PHARM REV CODE 636 W HCPCS: Performed by: HOSPITALIST

## 2021-12-14 PROCEDURE — 63600175 PHARM REV CODE 636 W HCPCS: Performed by: NURSE PRACTITIONER

## 2021-12-14 PROCEDURE — 25000003 PHARM REV CODE 250: Performed by: HOSPITALIST

## 2021-12-14 PROCEDURE — 97110 THERAPEUTIC EXERCISES: CPT | Mod: CQ

## 2021-12-14 PROCEDURE — 27000221 HC OXYGEN, UP TO 24 HOURS

## 2021-12-14 PROCEDURE — 94761 N-INVAS EAR/PLS OXIMETRY MLT: CPT

## 2021-12-14 RX ORDER — FUROSEMIDE 40 MG/1
40 TABLET ORAL 2 TIMES DAILY
Status: DISCONTINUED | OUTPATIENT
Start: 2021-12-14 | End: 2021-12-15 | Stop reason: HOSPADM

## 2021-12-14 RX ADMIN — ROPINIROLE 2 MG: 2 TABLET, FILM COATED ORAL at 03:12

## 2021-12-14 RX ADMIN — SIMETHICONE 80 MG: 80 TABLET, CHEWABLE ORAL at 08:12

## 2021-12-14 RX ADMIN — FUROSEMIDE 40 MG: 10 INJECTION, SOLUTION INTRAMUSCULAR; INTRAVENOUS at 06:12

## 2021-12-14 RX ADMIN — LEVOTHYROXINE SODIUM 112 MCG: 0.11 TABLET ORAL at 06:12

## 2021-12-14 RX ADMIN — LOSARTAN POTASSIUM 100 MG: 100 TABLET, FILM COATED ORAL at 09:12

## 2021-12-14 RX ADMIN — FUROSEMIDE 40 MG: 40 TABLET ORAL at 05:12

## 2021-12-14 RX ADMIN — ENOXAPARIN SODIUM 40 MG: 40 INJECTION SUBCUTANEOUS at 05:12

## 2021-12-14 RX ADMIN — VENLAFAXINE HYDROCHLORIDE 150 MG: 75 CAPSULE, EXTENDED RELEASE ORAL at 09:12

## 2021-12-14 RX ADMIN — AMLODIPINE BESYLATE 10 MG: 10 TABLET ORAL at 09:12

## 2021-12-14 RX ADMIN — ROPINIROLE 2 MG: 2 TABLET, FILM COATED ORAL at 09:12

## 2021-12-14 RX ADMIN — POLYETHYLENE GLYCOL 3350 17 G: 17 POWDER, FOR SOLUTION ORAL at 09:12

## 2021-12-14 RX ADMIN — SPIRONOLACTONE 25 MG: 25 TABLET ORAL at 09:12

## 2021-12-14 RX ADMIN — ACETAMINOPHEN 1000 MG: 500 TABLET ORAL at 11:12

## 2021-12-14 RX ADMIN — CHLORTHALIDONE 25 MG: 25 TABLET ORAL at 09:12

## 2021-12-15 ENCOUNTER — HOSPITAL ENCOUNTER (INPATIENT)
Facility: HOSPITAL | Age: 81
LOS: 12 days | Discharge: HOME HEALTH | DRG: 292 | End: 2021-12-27
Attending: HOSPITALIST | Admitting: HOSPITALIST
Payer: MEDICARE

## 2021-12-15 VITALS
TEMPERATURE: 99 F | WEIGHT: 186.31 LBS | SYSTOLIC BLOOD PRESSURE: 149 MMHG | DIASTOLIC BLOOD PRESSURE: 65 MMHG | HEIGHT: 62 IN | OXYGEN SATURATION: 99 % | RESPIRATION RATE: 17 BRPM | HEART RATE: 77 BPM | BODY MASS INDEX: 34.29 KG/M2

## 2021-12-15 DIAGNOSIS — I50.812 CHRONIC RIGHT-SIDED HEART FAILURE: ICD-10-CM

## 2021-12-15 DIAGNOSIS — I50.810: ICD-10-CM

## 2021-12-15 DIAGNOSIS — I50.810 RIGHT HEART FAILURE: ICD-10-CM

## 2021-12-15 PROBLEM — G47.09 OTHER INSOMNIA: Chronic | Status: ACTIVE | Noted: 2021-12-15

## 2021-12-15 PROBLEM — N30.00 ACUTE CYSTITIS WITHOUT HEMATURIA: Status: ACTIVE | Noted: 2021-12-15

## 2021-12-15 PROBLEM — Z79.899 DVT PROPHYLAXIS: Status: ACTIVE | Noted: 2021-12-15

## 2021-12-15 PROBLEM — K21.9 GASTROESOPHAGEAL REFLUX DISEASE WITHOUT ESOPHAGITIS: Chronic | Status: ACTIVE | Noted: 2021-12-15

## 2021-12-15 PROBLEM — F41.8 DEPRESSION WITH ANXIETY: Chronic | Status: ACTIVE | Noted: 2021-12-15

## 2021-12-15 PROBLEM — Z79.899 DVT PROPHYLAXIS: Status: RESOLVED | Noted: 2021-12-15 | Resolved: 2021-12-15

## 2021-12-15 LAB
ANION GAP SERPL CALCULATED.3IONS-SCNC: 9 MMOL/L (ref 7–16)
ANISOCYTOSIS BLD QL SMEAR: ABNORMAL
BASOPHILS # BLD AUTO: 0.03 K/UL (ref 0–0.2)
BASOPHILS NFR BLD AUTO: 0.4 % (ref 0–1)
BILIRUB UR QL STRIP: NEGATIVE
BUN SERPL-MCNC: 13 MG/DL (ref 7–18)
BUN/CREAT SERPL: 12 (ref 6–20)
CALCIUM SERPL-MCNC: 9.7 MG/DL (ref 8.5–10.1)
CHLORIDE SERPL-SCNC: 87 MMOL/L (ref 98–107)
CLARITY UR: CLEAR
CO2 SERPL-SCNC: 36 MMOL/L (ref 21–32)
COLOR UR: YELLOW
CREAT SERPL-MCNC: 1.12 MG/DL (ref 0.55–1.02)
DIFFERENTIAL METHOD BLD: ABNORMAL
EOSINOPHIL # BLD AUTO: 0.06 K/UL (ref 0–0.5)
EOSINOPHIL NFR BLD AUTO: 0.8 % (ref 1–4)
ERYTHROCYTE [DISTWIDTH] IN BLOOD BY AUTOMATED COUNT: 13.3 % (ref 11.5–14.5)
FLUAV AG UPPER RESP QL IA.RAPID: NEGATIVE
FLUBV AG UPPER RESP QL IA.RAPID: NEGATIVE
GLUCOSE SERPL-MCNC: 106 MG/DL (ref 74–106)
GLUCOSE UR STRIP-MCNC: NEGATIVE MG/DL
HCT VFR BLD AUTO: 32 % (ref 38–47)
HGB BLD-MCNC: 10.7 G/DL (ref 12–16)
HYPOCHROMIA BLD QL SMEAR: ABNORMAL
KETONES UR STRIP-SCNC: NEGATIVE MG/DL
LEUKOCYTE ESTERASE UR QL STRIP: NEGATIVE
LYMPHOCYTES # BLD AUTO: 1.78 K/UL (ref 1–4.8)
LYMPHOCYTES NFR BLD AUTO: 22.6 % (ref 27–41)
LYMPHOCYTES NFR BLD MANUAL: 28 % (ref 27–41)
MACROCYTES BLD QL SMEAR: ABNORMAL
MCH RBC QN AUTO: 29.7 PG (ref 27–31)
MCHC RBC AUTO-ENTMCNC: 33.4 G/DL (ref 32–36)
MCV RBC AUTO: 88.9 FL (ref 80–96)
MONOCYTES # BLD AUTO: 0.92 K/UL (ref 0–0.8)
MONOCYTES NFR BLD AUTO: 11.7 % (ref 2–6)
MONOCYTES NFR BLD MANUAL: 6 % (ref 2–6)
MPC BLD CALC-MCNC: 8.7 FL (ref 9.4–12.4)
NEUTROPHILS # BLD AUTO: 5.08 K/UL (ref 1.8–7.7)
NEUTROPHILS NFR BLD AUTO: 64.5 % (ref 53–65)
NEUTS BAND NFR BLD MANUAL: 2 % (ref 1–5)
NEUTS SEG NFR BLD MANUAL: 64 % (ref 50–62)
NITRITE UR QL STRIP: NEGATIVE
PH UR STRIP: 8 PH UNITS
PLATELET # BLD AUTO: 284 K/UL (ref 150–400)
PLATELET MORPHOLOGY: NORMAL
POTASSIUM SERPL-SCNC: 3 MMOL/L (ref 3.5–5.1)
PROT UR QL STRIP: NEGATIVE
RBC # BLD AUTO: 3.6 M/UL (ref 4.2–5.4)
RBC # UR STRIP: NEGATIVE /UL
SARS-COV+SARS-COV-2 AG RESP QL IA.RAPID: NEGATIVE
SODIUM SERPL-SCNC: 129 MMOL/L (ref 136–145)
SP GR UR STRIP: 1.01
UROBILINOGEN UR STRIP-ACNC: 0.2 MG/DL
WBC # BLD AUTO: 7.87 K/UL (ref 4.5–11)

## 2021-12-15 PROCEDURE — 25000003 PHARM REV CODE 250: Performed by: NURSE PRACTITIONER

## 2021-12-15 PROCEDURE — 25000003 PHARM REV CODE 250: Performed by: STUDENT IN AN ORGANIZED HEALTH CARE EDUCATION/TRAINING PROGRAM

## 2021-12-15 PROCEDURE — 81003 URINALYSIS AUTO W/O SCOPE: CPT | Performed by: NURSE PRACTITIONER

## 2021-12-15 PROCEDURE — 87428 SARSCOV & INF VIR A&B AG IA: CPT | Performed by: NURSE PRACTITIONER

## 2021-12-15 PROCEDURE — 85025 COMPLETE CBC W/AUTO DIFF WBC: CPT | Performed by: NURSE PRACTITIONER

## 2021-12-15 PROCEDURE — 94761 N-INVAS EAR/PLS OXIMETRY MLT: CPT

## 2021-12-15 PROCEDURE — 99239 HOSP IP/OBS DSCHRG MGMT >30: CPT | Mod: ,,, | Performed by: FAMILY MEDICINE

## 2021-12-15 PROCEDURE — 11000004 HC SNF PRIVATE

## 2021-12-15 PROCEDURE — 25000003 PHARM REV CODE 250: Performed by: FAMILY MEDICINE

## 2021-12-15 PROCEDURE — 99900035 HC TECH TIME PER 15 MIN (STAT)

## 2021-12-15 PROCEDURE — 99239 PR HOSPITAL DISCHARGE DAY,>30 MIN: ICD-10-PCS | Mod: ,,, | Performed by: FAMILY MEDICINE

## 2021-12-15 PROCEDURE — 94660 CPAP INITIATION&MGMT: CPT

## 2021-12-15 PROCEDURE — 27000221 HC OXYGEN, UP TO 24 HOURS

## 2021-12-15 PROCEDURE — 80048 BASIC METABOLIC PNL TOTAL CA: CPT | Performed by: NURSE PRACTITIONER

## 2021-12-15 PROCEDURE — 27000958

## 2021-12-15 PROCEDURE — 25000003 PHARM REV CODE 250: Performed by: HOSPITALIST

## 2021-12-15 PROCEDURE — 63600175 PHARM REV CODE 636 W HCPCS: Performed by: NURSE PRACTITIONER

## 2021-12-15 PROCEDURE — 36415 COLL VENOUS BLD VENIPUNCTURE: CPT | Performed by: NURSE PRACTITIONER

## 2021-12-15 RX ORDER — PANTOPRAZOLE SODIUM 40 MG/1
40 TABLET, DELAYED RELEASE ORAL DAILY
Status: DISCONTINUED | OUTPATIENT
Start: 2021-12-16 | End: 2021-12-27 | Stop reason: HOSPADM

## 2021-12-15 RX ORDER — ROPINIROLE 1 MG/1
2 TABLET, FILM COATED ORAL 3 TIMES DAILY
Status: DISCONTINUED | OUTPATIENT
Start: 2021-12-15 | End: 2021-12-27 | Stop reason: HOSPADM

## 2021-12-15 RX ORDER — TRAZODONE HYDROCHLORIDE 50 MG/1
50 TABLET ORAL NIGHTLY PRN
Qty: 10 TABLET | Refills: 0 | Status: ON HOLD
Start: 2021-12-15 | End: 2024-02-08

## 2021-12-15 RX ORDER — CHLORTHALIDONE 25 MG/1
25 TABLET ORAL DAILY
Status: DISCONTINUED | OUTPATIENT
Start: 2021-12-16 | End: 2021-12-27 | Stop reason: HOSPADM

## 2021-12-15 RX ORDER — LEVOFLOXACIN 250 MG/1
500 TABLET ORAL DAILY
Status: DISCONTINUED | OUTPATIENT
Start: 2021-12-15 | End: 2021-12-17 | Stop reason: DRUGHIGH

## 2021-12-15 RX ORDER — ACETAMINOPHEN 325 MG/1
650 TABLET ORAL EVERY 6 HOURS PRN
Status: DISCONTINUED | OUTPATIENT
Start: 2021-12-15 | End: 2021-12-15

## 2021-12-15 RX ORDER — BISACODYL 5 MG
10 TABLET, DELAYED RELEASE (ENTERIC COATED) ORAL DAILY PRN
Status: DISCONTINUED | OUTPATIENT
Start: 2021-12-15 | End: 2021-12-27 | Stop reason: HOSPADM

## 2021-12-15 RX ORDER — FUROSEMIDE 40 MG/1
40 TABLET ORAL 2 TIMES DAILY
Qty: 60 TABLET | Refills: 0 | Status: ON HOLD
Start: 2021-12-15 | End: 2021-12-27 | Stop reason: SDUPTHER

## 2021-12-15 RX ORDER — POLYETHYLENE GLYCOL 3350 17 G/17G
17 POWDER, FOR SOLUTION ORAL DAILY
Status: DISCONTINUED | OUTPATIENT
Start: 2021-12-16 | End: 2021-12-27 | Stop reason: HOSPADM

## 2021-12-15 RX ORDER — POLYETHYLENE GLYCOL 3350 17 G/17G
17 POWDER, FOR SOLUTION ORAL DAILY
Qty: 30 EACH | Refills: 0
Start: 2021-12-16 | End: 2022-01-15

## 2021-12-15 RX ORDER — LEVOTHYROXINE SODIUM 112 UG/1
112 TABLET ORAL
Status: DISCONTINUED | OUTPATIENT
Start: 2021-12-16 | End: 2021-12-27 | Stop reason: HOSPADM

## 2021-12-15 RX ORDER — SPIRONOLACTONE 25 MG/1
25 TABLET ORAL DAILY
Qty: 30 TABLET | Refills: 0
Start: 2021-12-16 | End: 2022-01-15

## 2021-12-15 RX ORDER — IPRATROPIUM BROMIDE AND ALBUTEROL SULFATE 2.5; .5 MG/3ML; MG/3ML
3 SOLUTION RESPIRATORY (INHALATION) EVERY 6 HOURS PRN
Qty: 75 ML | Refills: 0 | Status: ON HOLD
Start: 2021-12-15 | End: 2021-12-27 | Stop reason: HOSPADM

## 2021-12-15 RX ORDER — LEVOFLOXACIN 500 MG/1
500 TABLET, FILM COATED ORAL DAILY
Qty: 5 TABLET | Refills: 0 | Status: ON HOLD
Start: 2021-12-15 | End: 2021-12-27 | Stop reason: HOSPADM

## 2021-12-15 RX ORDER — ENOXAPARIN SODIUM 100 MG/ML
40 INJECTION SUBCUTANEOUS EVERY 24 HOURS
Status: DISCONTINUED | OUTPATIENT
Start: 2021-12-15 | End: 2021-12-17 | Stop reason: DRUGHIGH

## 2021-12-15 RX ORDER — VENLAFAXINE HYDROCHLORIDE 75 MG/1
150 CAPSULE, EXTENDED RELEASE ORAL DAILY
Status: DISCONTINUED | OUTPATIENT
Start: 2021-12-16 | End: 2021-12-27 | Stop reason: HOSPADM

## 2021-12-15 RX ORDER — ACETAMINOPHEN 500 MG
1000 TABLET ORAL EVERY 8 HOURS PRN
Refills: 0 | Status: ON HOLD
Start: 2021-12-15 | End: 2021-12-27 | Stop reason: HOSPADM

## 2021-12-15 RX ORDER — TRAZODONE HYDROCHLORIDE 50 MG/1
50 TABLET ORAL NIGHTLY PRN
Status: DISCONTINUED | OUTPATIENT
Start: 2021-12-15 | End: 2021-12-27 | Stop reason: HOSPADM

## 2021-12-15 RX ORDER — IPRATROPIUM BROMIDE AND ALBUTEROL SULFATE 2.5; .5 MG/3ML; MG/3ML
3 SOLUTION RESPIRATORY (INHALATION) EVERY 6 HOURS PRN
Status: DISCONTINUED | OUTPATIENT
Start: 2021-12-15 | End: 2021-12-27 | Stop reason: HOSPADM

## 2021-12-15 RX ORDER — GUAIFENESIN/DEXTROMETHORPHAN 100-10MG/5
10 SYRUP ORAL EVERY 6 HOURS PRN
Qty: 118 ML | Refills: 0
Start: 2021-12-15 | End: 2021-12-25

## 2021-12-15 RX ORDER — ENOXAPARIN SODIUM 100 MG/ML
40 INJECTION SUBCUTANEOUS DAILY
Status: ON HOLD
Start: 2021-12-15 | End: 2021-12-27 | Stop reason: HOSPADM

## 2021-12-15 RX ORDER — SPIRONOLACTONE 25 MG/1
25 TABLET ORAL DAILY
Status: DISCONTINUED | OUTPATIENT
Start: 2021-12-16 | End: 2021-12-17

## 2021-12-15 RX ORDER — LOSARTAN POTASSIUM 100 MG/1
100 TABLET ORAL DAILY
Qty: 30 TABLET | Refills: 0
Start: 2021-12-16 | End: 2022-01-15

## 2021-12-15 RX ORDER — LEVOTHYROXINE SODIUM 112 UG/1
112 TABLET ORAL
Qty: 30 TABLET | Refills: 0 | Status: ON HOLD
Start: 2021-12-16 | End: 2021-12-27 | Stop reason: HOSPADM

## 2021-12-15 RX ORDER — BISACODYL 5 MG
10 TABLET, DELAYED RELEASE (ENTERIC COATED) ORAL DAILY PRN
Refills: 0
Start: 2021-12-15 | End: 2022-01-14

## 2021-12-15 RX ORDER — CHLORTHALIDONE 25 MG/1
25 TABLET ORAL DAILY
Qty: 30 TABLET | Refills: 0
Start: 2021-12-16 | End: 2022-01-15

## 2021-12-15 RX ORDER — GUAIFENESIN/DEXTROMETHORPHAN 100-10MG/5
10 SYRUP ORAL EVERY 6 HOURS PRN
Status: DISCONTINUED | OUTPATIENT
Start: 2021-12-15 | End: 2021-12-27 | Stop reason: HOSPADM

## 2021-12-15 RX ORDER — FUROSEMIDE 40 MG/1
40 TABLET ORAL 2 TIMES DAILY
Status: DISCONTINUED | OUTPATIENT
Start: 2021-12-15 | End: 2021-12-17

## 2021-12-15 RX ORDER — ACETAMINOPHEN 500 MG
1000 TABLET ORAL EVERY 6 HOURS PRN
Refills: 0 | Status: ON HOLD
Start: 2021-12-15 | End: 2021-12-27 | Stop reason: HOSPADM

## 2021-12-15 RX ORDER — ACETAMINOPHEN 500 MG
1000 TABLET ORAL EVERY 8 HOURS PRN
Status: DISCONTINUED | OUTPATIENT
Start: 2021-12-15 | End: 2021-12-27 | Stop reason: HOSPADM

## 2021-12-15 RX ORDER — LOSARTAN POTASSIUM 100 MG/1
100 TABLET ORAL DAILY
Status: DISCONTINUED | OUTPATIENT
Start: 2021-12-16 | End: 2021-12-27 | Stop reason: HOSPADM

## 2021-12-15 RX ADMIN — VENLAFAXINE HYDROCHLORIDE 150 MG: 75 CAPSULE, EXTENDED RELEASE ORAL at 09:12

## 2021-12-15 RX ADMIN — LOSARTAN POTASSIUM 100 MG: 100 TABLET, FILM COATED ORAL at 09:12

## 2021-12-15 RX ADMIN — ROPINIROLE 2 MG: 1 TABLET, FILM COATED ORAL at 08:12

## 2021-12-15 RX ADMIN — LEVOFLOXACIN 500 MG: 250 TABLET, FILM COATED ORAL at 05:12

## 2021-12-15 RX ADMIN — FUROSEMIDE 40 MG: 40 TABLET ORAL at 09:12

## 2021-12-15 RX ADMIN — POLYETHYLENE GLYCOL 3350 17 G: 17 POWDER, FOR SOLUTION ORAL at 09:12

## 2021-12-15 RX ADMIN — ROPINIROLE 2 MG: 2 TABLET, FILM COATED ORAL at 09:12

## 2021-12-15 RX ADMIN — SPIRONOLACTONE 25 MG: 25 TABLET ORAL at 09:12

## 2021-12-15 RX ADMIN — FUROSEMIDE 40 MG: 40 TABLET ORAL at 05:12

## 2021-12-15 RX ADMIN — CHLORTHALIDONE 25 MG: 25 TABLET ORAL at 09:12

## 2021-12-15 RX ADMIN — ENOXAPARIN SODIUM 40 MG: 40 INJECTION SUBCUTANEOUS at 05:12

## 2021-12-15 RX ADMIN — LEVOTHYROXINE SODIUM 112 MCG: 0.11 TABLET ORAL at 05:12

## 2021-12-15 RX ADMIN — AMLODIPINE BESYLATE 10 MG: 10 TABLET ORAL at 09:12

## 2021-12-15 RX ADMIN — TRAZODONE HYDROCHLORIDE 50 MG: 50 TABLET ORAL at 08:12

## 2021-12-16 PROCEDURE — 97167 OT EVAL HIGH COMPLEX 60 MIN: CPT

## 2021-12-16 PROCEDURE — 99305 PR NURSING FACILITY CARE, INIT, MOD SEVERITY: ICD-10-PCS | Mod: AI,,, | Performed by: HOSPITALIST

## 2021-12-16 PROCEDURE — 11000004 HC SNF PRIVATE

## 2021-12-16 PROCEDURE — 27000958

## 2021-12-16 PROCEDURE — 25000003 PHARM REV CODE 250: Performed by: NURSE PRACTITIONER

## 2021-12-16 PROCEDURE — 97163 PT EVAL HIGH COMPLEX 45 MIN: CPT

## 2021-12-16 PROCEDURE — 99900035 HC TECH TIME PER 15 MIN (STAT)

## 2021-12-16 PROCEDURE — 25000003 PHARM REV CODE 250: Performed by: HOSPITALIST

## 2021-12-16 PROCEDURE — 97110 THERAPEUTIC EXERCISES: CPT

## 2021-12-16 PROCEDURE — 99305 1ST NF CARE MODERATE MDM 35: CPT | Mod: AI,,, | Performed by: HOSPITALIST

## 2021-12-16 PROCEDURE — 63600175 PHARM REV CODE 636 W HCPCS: Performed by: NURSE PRACTITIONER

## 2021-12-16 PROCEDURE — 27000221 HC OXYGEN, UP TO 24 HOURS

## 2021-12-16 RX ORDER — POTASSIUM CHLORIDE 20 MEQ/1
40 TABLET, EXTENDED RELEASE ORAL DAILY
Status: DISCONTINUED | OUTPATIENT
Start: 2021-12-16 | End: 2021-12-18

## 2021-12-16 RX ADMIN — CHLORTHALIDONE 25 MG: 25 TABLET ORAL at 08:12

## 2021-12-16 RX ADMIN — ROPINIROLE 2 MG: 1 TABLET, FILM COATED ORAL at 08:12

## 2021-12-16 RX ADMIN — ACETAMINOPHEN 1000 MG: 500 TABLET, FILM COATED ORAL at 05:12

## 2021-12-16 RX ADMIN — ROPINIROLE 2 MG: 1 TABLET, FILM COATED ORAL at 04:12

## 2021-12-16 RX ADMIN — POLYETHYLENE GLYCOL 3350 17 G: 17 POWDER, FOR SOLUTION ORAL at 08:12

## 2021-12-16 RX ADMIN — VENLAFAXINE HYDROCHLORIDE 150 MG: 75 CAPSULE, EXTENDED RELEASE ORAL at 08:12

## 2021-12-16 RX ADMIN — SPIRONOLACTONE 25 MG: 25 TABLET ORAL at 08:12

## 2021-12-16 RX ADMIN — LOSARTAN POTASSIUM 100 MG: 100 TABLET, FILM COATED ORAL at 08:12

## 2021-12-16 RX ADMIN — POTASSIUM CHLORIDE 40 MEQ: 20 TABLET, EXTENDED RELEASE ORAL at 02:12

## 2021-12-16 RX ADMIN — LEVOFLOXACIN 500 MG: 250 TABLET, FILM COATED ORAL at 08:12

## 2021-12-16 RX ADMIN — TRAZODONE HYDROCHLORIDE 50 MG: 50 TABLET ORAL at 08:12

## 2021-12-16 RX ADMIN — PANTOPRAZOLE SODIUM 40 MG: 40 TABLET, DELAYED RELEASE ORAL at 08:12

## 2021-12-16 RX ADMIN — FUROSEMIDE 40 MG: 40 TABLET ORAL at 04:12

## 2021-12-16 RX ADMIN — LEVOTHYROXINE SODIUM 112 MCG: 0.11 TABLET ORAL at 05:12

## 2021-12-16 RX ADMIN — ENOXAPARIN SODIUM 40 MG: 40 INJECTION SUBCUTANEOUS at 04:12

## 2021-12-16 RX ADMIN — FUROSEMIDE 40 MG: 40 TABLET ORAL at 08:12

## 2021-12-17 PROBLEM — R41.82 ACUTE ALTERATION IN MENTAL STATUS: Status: ACTIVE | Noted: 2021-12-17

## 2021-12-17 LAB
ALBUMIN SERPL BCP-MCNC: 3 G/DL (ref 3.5–5)
ALP SERPL-CCNC: 60 U/L (ref 55–142)
ALT SERPL W P-5'-P-CCNC: 30 U/L (ref 13–56)
AMMONIA PLAS-SCNC: 27 ΜMOL/L (ref 11–32)
ANION GAP SERPL CALCULATED.3IONS-SCNC: 11 MMOL/L (ref 7–16)
AST SERPL W P-5'-P-CCNC: 44 U/L (ref 15–37)
BILIRUB DIRECT SERPL-MCNC: 0.3 MG/DL (ref 0–0.2)
BILIRUB SERPL-MCNC: 0.9 MG/DL (ref 0–1.2)
BUN SERPL-MCNC: 19 MG/DL (ref 7–18)
BUN/CREAT SERPL: 14 (ref 6–20)
CALCIUM SERPL-MCNC: 9.7 MG/DL (ref 8.5–10.1)
CHLORIDE SERPL-SCNC: 88 MMOL/L (ref 98–107)
CO2 SERPL-SCNC: 32 MMOL/L (ref 21–32)
CREAT SERPL-MCNC: 1.35 MG/DL (ref 0.55–1.02)
GLUCOSE SERPL-MCNC: 115 MG/DL (ref 74–106)
GLUCOSE SERPL-MCNC: 132 MG/DL (ref 70–105)
POTASSIUM SERPL-SCNC: 3.8 MMOL/L (ref 3.5–5.1)
PROT SERPL-MCNC: 8.4 G/DL (ref 6.4–8.2)
SODIUM SERPL-SCNC: 127 MMOL/L (ref 136–145)

## 2021-12-17 PROCEDURE — 82140 ASSAY OF AMMONIA: CPT | Performed by: HOSPITALIST

## 2021-12-17 PROCEDURE — 27000221 HC OXYGEN, UP TO 24 HOURS

## 2021-12-17 PROCEDURE — 25000003 PHARM REV CODE 250: Performed by: NURSE PRACTITIONER

## 2021-12-17 PROCEDURE — 27000958

## 2021-12-17 PROCEDURE — 99309 PR NURSING FAC CARE, SUBSEQ, SIGNIF COMPLIC: ICD-10-PCS | Mod: ,,, | Performed by: HOSPITALIST

## 2021-12-17 PROCEDURE — 82248 BILIRUBIN DIRECT: CPT | Performed by: HOSPITALIST

## 2021-12-17 PROCEDURE — 94660 CPAP INITIATION&MGMT: CPT

## 2021-12-17 PROCEDURE — 97530 THERAPEUTIC ACTIVITIES: CPT

## 2021-12-17 PROCEDURE — 97110 THERAPEUTIC EXERCISES: CPT

## 2021-12-17 PROCEDURE — 11000004 HC SNF PRIVATE

## 2021-12-17 PROCEDURE — 99309 SBSQ NF CARE MODERATE MDM 30: CPT | Mod: ,,, | Performed by: HOSPITALIST

## 2021-12-17 PROCEDURE — 80053 COMPREHEN METABOLIC PANEL: CPT | Performed by: HOSPITALIST

## 2021-12-17 PROCEDURE — 36415 COLL VENOUS BLD VENIPUNCTURE: CPT | Performed by: HOSPITALIST

## 2021-12-17 PROCEDURE — 92610 EVALUATE SWALLOWING FUNCTION: CPT

## 2021-12-17 PROCEDURE — 82962 GLUCOSE BLOOD TEST: CPT

## 2021-12-17 PROCEDURE — 25000003 PHARM REV CODE 250: Performed by: HOSPITALIST

## 2021-12-17 PROCEDURE — 63600175 PHARM REV CODE 636 W HCPCS: Performed by: HOSPITALIST

## 2021-12-17 PROCEDURE — 92523 SPEECH SOUND LANG COMPREHEN: CPT

## 2021-12-17 PROCEDURE — 27000190 HC CPAP FULL FACE MASK W/VALVE

## 2021-12-17 RX ORDER — ENOXAPARIN SODIUM 100 MG/ML
30 INJECTION SUBCUTANEOUS EVERY 24 HOURS
Status: DISCONTINUED | OUTPATIENT
Start: 2021-12-17 | End: 2021-12-27 | Stop reason: HOSPADM

## 2021-12-17 RX ORDER — FUROSEMIDE 40 MG/1
40 TABLET ORAL DAILY
Status: DISCONTINUED | OUTPATIENT
Start: 2021-12-18 | End: 2021-12-27 | Stop reason: HOSPADM

## 2021-12-17 RX ORDER — SPIRONOLACTONE 25 MG/1
25 TABLET ORAL
Status: DISCONTINUED | OUTPATIENT
Start: 2021-12-18 | End: 2021-12-22

## 2021-12-17 RX ORDER — LEVOFLOXACIN 250 MG/1
250 TABLET ORAL DAILY
Status: COMPLETED | OUTPATIENT
Start: 2021-12-18 | End: 2021-12-19

## 2021-12-17 RX ADMIN — CHLORTHALIDONE 25 MG: 25 TABLET ORAL at 09:12

## 2021-12-17 RX ADMIN — ROPINIROLE 2 MG: 1 TABLET, FILM COATED ORAL at 09:12

## 2021-12-17 RX ADMIN — ACETAMINOPHEN 1000 MG: 500 TABLET, FILM COATED ORAL at 03:12

## 2021-12-17 RX ADMIN — ENOXAPARIN SODIUM 30 MG: 30 INJECTION SUBCUTANEOUS at 05:12

## 2021-12-17 RX ADMIN — POLYETHYLENE GLYCOL 3350 17 G: 17 POWDER, FOR SOLUTION ORAL at 09:12

## 2021-12-17 RX ADMIN — SPIRONOLACTONE 25 MG: 25 TABLET ORAL at 09:12

## 2021-12-17 RX ADMIN — FUROSEMIDE 40 MG: 40 TABLET ORAL at 09:12

## 2021-12-17 RX ADMIN — VENLAFAXINE HYDROCHLORIDE 150 MG: 75 CAPSULE, EXTENDED RELEASE ORAL at 09:12

## 2021-12-17 RX ADMIN — LEVOFLOXACIN 500 MG: 250 TABLET, FILM COATED ORAL at 09:12

## 2021-12-17 RX ADMIN — POTASSIUM CHLORIDE 40 MEQ: 20 TABLET, EXTENDED RELEASE ORAL at 09:12

## 2021-12-17 RX ADMIN — ROPINIROLE 2 MG: 1 TABLET, FILM COATED ORAL at 03:12

## 2021-12-17 RX ADMIN — LEVOTHYROXINE SODIUM 112 MCG: 0.11 TABLET ORAL at 05:12

## 2021-12-17 RX ADMIN — PANTOPRAZOLE SODIUM 40 MG: 40 TABLET, DELAYED RELEASE ORAL at 09:12

## 2021-12-17 RX ADMIN — LOSARTAN POTASSIUM 100 MG: 100 TABLET, FILM COATED ORAL at 09:12

## 2021-12-18 LAB
ANION GAP SERPL CALCULATED.3IONS-SCNC: 10 MMOL/L (ref 7–16)
BUN SERPL-MCNC: 26 MG/DL (ref 7–18)
BUN/CREAT SERPL: 17 (ref 6–20)
CALCIUM SERPL-MCNC: 9 MG/DL (ref 8.5–10.1)
CHLORIDE SERPL-SCNC: 92 MMOL/L (ref 98–107)
CO2 SERPL-SCNC: 31 MMOL/L (ref 21–32)
CREAT SERPL-MCNC: 1.56 MG/DL (ref 0.55–1.02)
GLUCOSE SERPL-MCNC: 108 MG/DL (ref 74–106)
POTASSIUM SERPL-SCNC: 3.2 MMOL/L (ref 3.5–5.1)
SODIUM SERPL-SCNC: 130 MMOL/L (ref 136–145)

## 2021-12-18 PROCEDURE — 63600175 PHARM REV CODE 636 W HCPCS: Performed by: HOSPITALIST

## 2021-12-18 PROCEDURE — 25000003 PHARM REV CODE 250: Performed by: NURSE PRACTITIONER

## 2021-12-18 PROCEDURE — 36415 COLL VENOUS BLD VENIPUNCTURE: CPT | Performed by: HOSPITALIST

## 2021-12-18 PROCEDURE — 80048 BASIC METABOLIC PNL TOTAL CA: CPT | Performed by: HOSPITALIST

## 2021-12-18 PROCEDURE — 97535 SELF CARE MNGMENT TRAINING: CPT | Mod: CO

## 2021-12-18 PROCEDURE — 25000003 PHARM REV CODE 250: Performed by: HOSPITALIST

## 2021-12-18 PROCEDURE — 97530 THERAPEUTIC ACTIVITIES: CPT | Mod: CO

## 2021-12-18 PROCEDURE — 27000958

## 2021-12-18 PROCEDURE — 99900035 HC TECH TIME PER 15 MIN (STAT)

## 2021-12-18 PROCEDURE — 94761 N-INVAS EAR/PLS OXIMETRY MLT: CPT

## 2021-12-18 PROCEDURE — 27000221 HC OXYGEN, UP TO 24 HOURS

## 2021-12-18 PROCEDURE — 11000004 HC SNF PRIVATE

## 2021-12-18 RX ADMIN — POTASSIUM CHLORIDE 40 MEQ: 20 TABLET, EXTENDED RELEASE ORAL at 08:12

## 2021-12-18 RX ADMIN — SPIRONOLACTONE 25 MG: 25 TABLET ORAL at 08:12

## 2021-12-18 RX ADMIN — ROPINIROLE 2 MG: 1 TABLET, FILM COATED ORAL at 08:12

## 2021-12-18 RX ADMIN — LEVOFLOXACIN 250 MG: 250 TABLET, FILM COATED ORAL at 08:12

## 2021-12-18 RX ADMIN — POLYETHYLENE GLYCOL 3350 17 G: 17 POWDER, FOR SOLUTION ORAL at 08:12

## 2021-12-18 RX ADMIN — LOSARTAN POTASSIUM 100 MG: 100 TABLET, FILM COATED ORAL at 08:12

## 2021-12-18 RX ADMIN — CHLORTHALIDONE 25 MG: 25 TABLET ORAL at 08:12

## 2021-12-18 RX ADMIN — LEVOTHYROXINE SODIUM 112 MCG: 0.11 TABLET ORAL at 05:12

## 2021-12-18 RX ADMIN — PANTOPRAZOLE SODIUM 40 MG: 40 TABLET, DELAYED RELEASE ORAL at 08:12

## 2021-12-18 RX ADMIN — ROPINIROLE 2 MG: 1 TABLET, FILM COATED ORAL at 02:12

## 2021-12-18 RX ADMIN — ENOXAPARIN SODIUM 30 MG: 30 INJECTION SUBCUTANEOUS at 05:12

## 2021-12-18 RX ADMIN — FUROSEMIDE 40 MG: 40 TABLET ORAL at 08:12

## 2021-12-18 RX ADMIN — ROPINIROLE 2 MG: 1 TABLET, FILM COATED ORAL at 09:12

## 2021-12-18 RX ADMIN — VENLAFAXINE HYDROCHLORIDE 150 MG: 75 CAPSULE, EXTENDED RELEASE ORAL at 08:12

## 2021-12-19 PROCEDURE — 11000004 HC SNF PRIVATE

## 2021-12-19 PROCEDURE — 94761 N-INVAS EAR/PLS OXIMETRY MLT: CPT

## 2021-12-19 PROCEDURE — 97530 THERAPEUTIC ACTIVITIES: CPT | Mod: CO

## 2021-12-19 PROCEDURE — 25000003 PHARM REV CODE 250: Performed by: NURSE PRACTITIONER

## 2021-12-19 PROCEDURE — 63600175 PHARM REV CODE 636 W HCPCS: Performed by: HOSPITALIST

## 2021-12-19 PROCEDURE — 97110 THERAPEUTIC EXERCISES: CPT

## 2021-12-19 PROCEDURE — 27000221 HC OXYGEN, UP TO 24 HOURS

## 2021-12-19 PROCEDURE — 25000003 PHARM REV CODE 250: Performed by: HOSPITALIST

## 2021-12-19 PROCEDURE — 27000958

## 2021-12-19 RX ADMIN — VENLAFAXINE HYDROCHLORIDE 150 MG: 75 CAPSULE, EXTENDED RELEASE ORAL at 08:12

## 2021-12-19 RX ADMIN — ROPINIROLE 2 MG: 1 TABLET, FILM COATED ORAL at 08:12

## 2021-12-19 RX ADMIN — POLYETHYLENE GLYCOL 3350 17 G: 17 POWDER, FOR SOLUTION ORAL at 08:12

## 2021-12-19 RX ADMIN — LEVOTHYROXINE SODIUM 112 MCG: 0.11 TABLET ORAL at 05:12

## 2021-12-19 RX ADMIN — TRAZODONE HYDROCHLORIDE 50 MG: 50 TABLET ORAL at 08:12

## 2021-12-19 RX ADMIN — LOSARTAN POTASSIUM 100 MG: 100 TABLET, FILM COATED ORAL at 08:12

## 2021-12-19 RX ADMIN — FUROSEMIDE 40 MG: 40 TABLET ORAL at 08:12

## 2021-12-19 RX ADMIN — LEVOFLOXACIN 250 MG: 250 TABLET, FILM COATED ORAL at 08:12

## 2021-12-19 RX ADMIN — ENOXAPARIN SODIUM 30 MG: 30 INJECTION SUBCUTANEOUS at 04:12

## 2021-12-19 RX ADMIN — CHLORTHALIDONE 25 MG: 25 TABLET ORAL at 08:12

## 2021-12-19 RX ADMIN — POTASSIUM BICARBONATE 25 MEQ: 977.5 TABLET, EFFERVESCENT ORAL at 08:12

## 2021-12-19 RX ADMIN — PANTOPRAZOLE SODIUM 40 MG: 40 TABLET, DELAYED RELEASE ORAL at 08:12

## 2021-12-19 RX ADMIN — ROPINIROLE 2 MG: 1 TABLET, FILM COATED ORAL at 02:12

## 2021-12-20 PROCEDURE — 97110 THERAPEUTIC EXERCISES: CPT

## 2021-12-20 PROCEDURE — 99900035 HC TECH TIME PER 15 MIN (STAT)

## 2021-12-20 PROCEDURE — 97530 THERAPEUTIC ACTIVITIES: CPT

## 2021-12-20 PROCEDURE — 25000003 PHARM REV CODE 250: Performed by: NURSE PRACTITIONER

## 2021-12-20 PROCEDURE — 25000003 PHARM REV CODE 250: Performed by: HOSPITALIST

## 2021-12-20 PROCEDURE — 97530 THERAPEUTIC ACTIVITIES: CPT | Mod: CO

## 2021-12-20 PROCEDURE — 27000958

## 2021-12-20 PROCEDURE — 92526 ORAL FUNCTION THERAPY: CPT

## 2021-12-20 PROCEDURE — 99308 PR NURSING FAC CARE, SUBSEQ, MINOR COMPLIC: ICD-10-PCS | Mod: ,,, | Performed by: HOSPITALIST

## 2021-12-20 PROCEDURE — 99308 SBSQ NF CARE LOW MDM 20: CPT | Mod: ,,, | Performed by: HOSPITALIST

## 2021-12-20 PROCEDURE — 92507 TX SP LANG VOICE COMM INDIV: CPT

## 2021-12-20 PROCEDURE — 11000004 HC SNF PRIVATE

## 2021-12-20 PROCEDURE — 63600175 PHARM REV CODE 636 W HCPCS: Performed by: HOSPITALIST

## 2021-12-20 RX ADMIN — ENOXAPARIN SODIUM 30 MG: 30 INJECTION SUBCUTANEOUS at 04:12

## 2021-12-20 RX ADMIN — ACETAMINOPHEN 1000 MG: 500 TABLET, FILM COATED ORAL at 04:12

## 2021-12-20 RX ADMIN — VENLAFAXINE HYDROCHLORIDE 150 MG: 75 CAPSULE, EXTENDED RELEASE ORAL at 09:12

## 2021-12-20 RX ADMIN — LOSARTAN POTASSIUM 100 MG: 100 TABLET, FILM COATED ORAL at 09:12

## 2021-12-20 RX ADMIN — LEVOTHYROXINE SODIUM 112 MCG: 0.11 TABLET ORAL at 05:12

## 2021-12-20 RX ADMIN — PANTOPRAZOLE SODIUM 40 MG: 40 TABLET, DELAYED RELEASE ORAL at 09:12

## 2021-12-20 RX ADMIN — POLYETHYLENE GLYCOL 3350 17 G: 17 POWDER, FOR SOLUTION ORAL at 09:12

## 2021-12-20 RX ADMIN — CHLORTHALIDONE 25 MG: 25 TABLET ORAL at 09:12

## 2021-12-20 RX ADMIN — ROPINIROLE 2 MG: 1 TABLET, FILM COATED ORAL at 02:12

## 2021-12-20 RX ADMIN — ROPINIROLE 2 MG: 1 TABLET, FILM COATED ORAL at 09:12

## 2021-12-20 RX ADMIN — SPIRONOLACTONE 25 MG: 25 TABLET ORAL at 09:12

## 2021-12-20 RX ADMIN — TRAZODONE HYDROCHLORIDE 50 MG: 50 TABLET ORAL at 08:12

## 2021-12-20 RX ADMIN — FUROSEMIDE 40 MG: 40 TABLET ORAL at 09:12

## 2021-12-20 RX ADMIN — ROPINIROLE 2 MG: 1 TABLET, FILM COATED ORAL at 08:12

## 2021-12-20 RX ADMIN — POTASSIUM BICARBONATE 25 MEQ: 977.5 TABLET, EFFERVESCENT ORAL at 09:12

## 2021-12-21 LAB
ANION GAP SERPL CALCULATED.3IONS-SCNC: 13 MMOL/L (ref 7–16)
BUN SERPL-MCNC: 27 MG/DL (ref 7–18)
BUN/CREAT SERPL: 17 (ref 6–20)
CALCIUM SERPL-MCNC: 9.2 MG/DL (ref 8.5–10.1)
CHLORIDE SERPL-SCNC: 93 MMOL/L (ref 98–107)
CO2 SERPL-SCNC: 29 MMOL/L (ref 21–32)
CREAT SERPL-MCNC: 1.62 MG/DL (ref 0.55–1.02)
GLUCOSE SERPL-MCNC: 99 MG/DL (ref 74–106)
POTASSIUM SERPL-SCNC: 3.7 MMOL/L (ref 3.5–5.1)
SODIUM SERPL-SCNC: 131 MMOL/L (ref 136–145)

## 2021-12-21 PROCEDURE — 92507 TX SP LANG VOICE COMM INDIV: CPT

## 2021-12-21 PROCEDURE — 97110 THERAPEUTIC EXERCISES: CPT

## 2021-12-21 PROCEDURE — 97530 THERAPEUTIC ACTIVITIES: CPT

## 2021-12-21 PROCEDURE — 80048 BASIC METABOLIC PNL TOTAL CA: CPT | Performed by: HOSPITALIST

## 2021-12-21 PROCEDURE — 27000958

## 2021-12-21 PROCEDURE — 63600175 PHARM REV CODE 636 W HCPCS: Performed by: HOSPITALIST

## 2021-12-21 PROCEDURE — 25000003 PHARM REV CODE 250: Performed by: NURSE PRACTITIONER

## 2021-12-21 PROCEDURE — 36415 COLL VENOUS BLD VENIPUNCTURE: CPT | Performed by: HOSPITALIST

## 2021-12-21 PROCEDURE — 11000004 HC SNF PRIVATE

## 2021-12-21 PROCEDURE — 25000003 PHARM REV CODE 250: Performed by: HOSPITALIST

## 2021-12-21 PROCEDURE — 92526 ORAL FUNCTION THERAPY: CPT

## 2021-12-21 PROCEDURE — 99900035 HC TECH TIME PER 15 MIN (STAT)

## 2021-12-21 RX ADMIN — POTASSIUM BICARBONATE 25 MEQ: 977.5 TABLET, EFFERVESCENT ORAL at 08:12

## 2021-12-21 RX ADMIN — ROPINIROLE 2 MG: 1 TABLET, FILM COATED ORAL at 08:12

## 2021-12-21 RX ADMIN — LEVOTHYROXINE SODIUM 112 MCG: 0.11 TABLET ORAL at 05:12

## 2021-12-21 RX ADMIN — TRAZODONE HYDROCHLORIDE 50 MG: 50 TABLET ORAL at 08:12

## 2021-12-21 RX ADMIN — CHLORTHALIDONE 25 MG: 25 TABLET ORAL at 09:12

## 2021-12-21 RX ADMIN — PANTOPRAZOLE SODIUM 40 MG: 40 TABLET, DELAYED RELEASE ORAL at 08:12

## 2021-12-21 RX ADMIN — ACETAMINOPHEN 1000 MG: 500 TABLET, FILM COATED ORAL at 12:12

## 2021-12-21 RX ADMIN — ROPINIROLE 2 MG: 1 TABLET, FILM COATED ORAL at 03:12

## 2021-12-21 RX ADMIN — ACETAMINOPHEN 1000 MG: 500 TABLET, FILM COATED ORAL at 08:12

## 2021-12-21 RX ADMIN — FUROSEMIDE 40 MG: 40 TABLET ORAL at 08:12

## 2021-12-21 RX ADMIN — VENLAFAXINE HYDROCHLORIDE 150 MG: 75 CAPSULE, EXTENDED RELEASE ORAL at 08:12

## 2021-12-21 RX ADMIN — ENOXAPARIN SODIUM 30 MG: 30 INJECTION SUBCUTANEOUS at 05:12

## 2021-12-22 LAB
ANION GAP SERPL CALCULATED.3IONS-SCNC: 11 MMOL/L (ref 7–16)
BASOPHILS # BLD AUTO: 0.04 K/UL (ref 0–0.2)
BASOPHILS NFR BLD AUTO: 0.7 % (ref 0–1)
BUN SERPL-MCNC: 29 MG/DL (ref 7–18)
BUN/CREAT SERPL: 18 (ref 6–20)
CALCIUM SERPL-MCNC: 9 MG/DL (ref 8.5–10.1)
CHLORIDE SERPL-SCNC: 93 MMOL/L (ref 98–107)
CO2 SERPL-SCNC: 29 MMOL/L (ref 21–32)
CREAT SERPL-MCNC: 1.61 MG/DL (ref 0.55–1.02)
DIFFERENTIAL METHOD BLD: ABNORMAL
EOSINOPHIL # BLD AUTO: 0.09 K/UL (ref 0–0.5)
EOSINOPHIL NFR BLD AUTO: 1.6 % (ref 1–4)
ERYTHROCYTE [DISTWIDTH] IN BLOOD BY AUTOMATED COUNT: 13.8 % (ref 11.5–14.5)
GLUCOSE SERPL-MCNC: 114 MG/DL (ref 74–106)
HCT VFR BLD AUTO: 31.3 % (ref 38–47)
HGB BLD-MCNC: 10.3 G/DL (ref 12–16)
HYPOCHROMIA BLD QL SMEAR: ABNORMAL
LYMPHOCYTES # BLD AUTO: 1.85 K/UL (ref 1–4.8)
LYMPHOCYTES NFR BLD AUTO: 33.8 % (ref 27–41)
LYMPHOCYTES NFR BLD MANUAL: 36 % (ref 27–41)
MCH RBC QN AUTO: 29.3 PG (ref 27–31)
MCHC RBC AUTO-ENTMCNC: 32.9 G/DL (ref 32–36)
MCV RBC AUTO: 88.9 FL (ref 80–96)
MONOCYTES # BLD AUTO: 0.74 K/UL (ref 0–0.8)
MONOCYTES NFR BLD AUTO: 13.5 % (ref 2–6)
MONOCYTES NFR BLD MANUAL: 9 % (ref 2–6)
MPC BLD CALC-MCNC: 8.5 FL (ref 9.4–12.4)
NEUTROPHILS # BLD AUTO: 2.76 K/UL (ref 1.8–7.7)
NEUTROPHILS NFR BLD AUTO: 50.4 % (ref 53–65)
NEUTS SEG NFR BLD MANUAL: 55 % (ref 50–62)
PLATELET # BLD AUTO: 417 K/UL (ref 150–400)
PLATELET MORPHOLOGY: ABNORMAL
POTASSIUM SERPL-SCNC: 3.4 MMOL/L (ref 3.5–5.1)
RBC # BLD AUTO: 3.52 M/UL (ref 4.2–5.4)
SODIUM SERPL-SCNC: 130 MMOL/L (ref 136–145)
WBC # BLD AUTO: 5.48 K/UL (ref 4.5–11)

## 2021-12-22 PROCEDURE — 97116 GAIT TRAINING THERAPY: CPT | Mod: CQ

## 2021-12-22 PROCEDURE — 97110 THERAPEUTIC EXERCISES: CPT | Mod: CQ

## 2021-12-22 PROCEDURE — 99308 PR NURSING FAC CARE, SUBSEQ, MINOR COMPLIC: ICD-10-PCS | Mod: ,,, | Performed by: HOSPITALIST

## 2021-12-22 PROCEDURE — 25000003 PHARM REV CODE 250: Performed by: NURSE PRACTITIONER

## 2021-12-22 PROCEDURE — 85025 COMPLETE CBC W/AUTO DIFF WBC: CPT | Performed by: NURSE PRACTITIONER

## 2021-12-22 PROCEDURE — 97530 THERAPEUTIC ACTIVITIES: CPT

## 2021-12-22 PROCEDURE — 25000003 PHARM REV CODE 250: Performed by: HOSPITALIST

## 2021-12-22 PROCEDURE — 99308 SBSQ NF CARE LOW MDM 20: CPT | Mod: ,,, | Performed by: HOSPITALIST

## 2021-12-22 PROCEDURE — 80048 BASIC METABOLIC PNL TOTAL CA: CPT | Performed by: NURSE PRACTITIONER

## 2021-12-22 PROCEDURE — 11000004 HC SNF PRIVATE

## 2021-12-22 PROCEDURE — 63600175 PHARM REV CODE 636 W HCPCS: Performed by: HOSPITALIST

## 2021-12-22 PROCEDURE — 36415 COLL VENOUS BLD VENIPUNCTURE: CPT | Performed by: NURSE PRACTITIONER

## 2021-12-22 PROCEDURE — 27000958

## 2021-12-22 PROCEDURE — 97110 THERAPEUTIC EXERCISES: CPT

## 2021-12-22 PROCEDURE — 99900035 HC TECH TIME PER 15 MIN (STAT)

## 2021-12-22 RX ADMIN — ACETAMINOPHEN 1000 MG: 500 TABLET, FILM COATED ORAL at 03:12

## 2021-12-22 RX ADMIN — ROPINIROLE 2 MG: 1 TABLET, FILM COATED ORAL at 08:12

## 2021-12-22 RX ADMIN — TRAZODONE HYDROCHLORIDE 50 MG: 50 TABLET ORAL at 08:12

## 2021-12-22 RX ADMIN — VENLAFAXINE HYDROCHLORIDE 150 MG: 75 CAPSULE, EXTENDED RELEASE ORAL at 08:12

## 2021-12-22 RX ADMIN — SPIRONOLACTONE 25 MG: 25 TABLET ORAL at 08:12

## 2021-12-22 RX ADMIN — ROPINIROLE 2 MG: 1 TABLET, FILM COATED ORAL at 02:12

## 2021-12-22 RX ADMIN — POTASSIUM BICARBONATE 25 MEQ: 977.5 TABLET, EFFERVESCENT ORAL at 08:12

## 2021-12-22 RX ADMIN — LEVOTHYROXINE SODIUM 112 MCG: 0.11 TABLET ORAL at 05:12

## 2021-12-22 RX ADMIN — FUROSEMIDE 40 MG: 40 TABLET ORAL at 08:12

## 2021-12-22 RX ADMIN — ENOXAPARIN SODIUM 30 MG: 30 INJECTION SUBCUTANEOUS at 04:12

## 2021-12-22 RX ADMIN — CHLORTHALIDONE 25 MG: 25 TABLET ORAL at 08:12

## 2021-12-22 RX ADMIN — PANTOPRAZOLE SODIUM 40 MG: 40 TABLET, DELAYED RELEASE ORAL at 08:12

## 2021-12-22 RX ADMIN — LOSARTAN POTASSIUM 100 MG: 100 TABLET, FILM COATED ORAL at 08:12

## 2021-12-23 LAB
ANION GAP SERPL CALCULATED.3IONS-SCNC: 14 MMOL/L (ref 7–16)
BUN SERPL-MCNC: 25 MG/DL (ref 7–18)
BUN/CREAT SERPL: 19 (ref 6–20)
CALCIUM SERPL-MCNC: 9.2 MG/DL (ref 8.5–10.1)
CHLORIDE SERPL-SCNC: 95 MMOL/L (ref 98–107)
CO2 SERPL-SCNC: 27 MMOL/L (ref 21–32)
CREAT SERPL-MCNC: 1.29 MG/DL (ref 0.55–1.02)
GLUCOSE SERPL-MCNC: 104 MG/DL (ref 74–106)
POTASSIUM SERPL-SCNC: 3.7 MMOL/L (ref 3.5–5.1)
SODIUM SERPL-SCNC: 132 MMOL/L (ref 136–145)

## 2021-12-23 PROCEDURE — 97116 GAIT TRAINING THERAPY: CPT | Mod: CQ

## 2021-12-23 PROCEDURE — 99308 SBSQ NF CARE LOW MDM 20: CPT | Mod: ,,, | Performed by: HOSPITALIST

## 2021-12-23 PROCEDURE — 25000003 PHARM REV CODE 250: Performed by: NURSE PRACTITIONER

## 2021-12-23 PROCEDURE — 97110 THERAPEUTIC EXERCISES: CPT | Mod: CQ

## 2021-12-23 PROCEDURE — 36415 COLL VENOUS BLD VENIPUNCTURE: CPT | Performed by: HOSPITALIST

## 2021-12-23 PROCEDURE — 27000958

## 2021-12-23 PROCEDURE — 25000003 PHARM REV CODE 250: Performed by: HOSPITALIST

## 2021-12-23 PROCEDURE — 97110 THERAPEUTIC EXERCISES: CPT

## 2021-12-23 PROCEDURE — 80048 BASIC METABOLIC PNL TOTAL CA: CPT | Performed by: HOSPITALIST

## 2021-12-23 PROCEDURE — 97530 THERAPEUTIC ACTIVITIES: CPT

## 2021-12-23 PROCEDURE — 11000004 HC SNF PRIVATE

## 2021-12-23 PROCEDURE — 99308 PR NURSING FAC CARE, SUBSEQ, MINOR COMPLIC: ICD-10-PCS | Mod: ,,, | Performed by: HOSPITALIST

## 2021-12-23 RX ADMIN — PANTOPRAZOLE SODIUM 40 MG: 40 TABLET, DELAYED RELEASE ORAL at 08:12

## 2021-12-23 RX ADMIN — CHLORTHALIDONE 25 MG: 25 TABLET ORAL at 08:12

## 2021-12-23 RX ADMIN — ROPINIROLE 2 MG: 1 TABLET, FILM COATED ORAL at 08:12

## 2021-12-23 RX ADMIN — FUROSEMIDE 40 MG: 40 TABLET ORAL at 08:12

## 2021-12-23 RX ADMIN — ACETAMINOPHEN 1000 MG: 500 TABLET, FILM COATED ORAL at 05:12

## 2021-12-23 RX ADMIN — LOSARTAN POTASSIUM 100 MG: 100 TABLET, FILM COATED ORAL at 08:12

## 2021-12-23 RX ADMIN — POTASSIUM BICARBONATE 25 MEQ: 977.5 TABLET, EFFERVESCENT ORAL at 08:12

## 2021-12-23 RX ADMIN — VENLAFAXINE HYDROCHLORIDE 150 MG: 75 CAPSULE, EXTENDED RELEASE ORAL at 08:12

## 2021-12-23 RX ADMIN — ROPINIROLE 2 MG: 1 TABLET, FILM COATED ORAL at 02:12

## 2021-12-23 RX ADMIN — ACETAMINOPHEN 1000 MG: 500 TABLET, FILM COATED ORAL at 08:12

## 2021-12-23 RX ADMIN — TRAZODONE HYDROCHLORIDE 50 MG: 50 TABLET ORAL at 08:12

## 2021-12-23 RX ADMIN — LEVOTHYROXINE SODIUM 112 MCG: 0.11 TABLET ORAL at 05:12

## 2021-12-24 PROCEDURE — 25000003 PHARM REV CODE 250: Performed by: HOSPITALIST

## 2021-12-24 PROCEDURE — 63600175 PHARM REV CODE 636 W HCPCS: Performed by: HOSPITALIST

## 2021-12-24 PROCEDURE — 27000958

## 2021-12-24 PROCEDURE — 11000004 HC SNF PRIVATE

## 2021-12-24 PROCEDURE — 25000003 PHARM REV CODE 250: Performed by: NURSE PRACTITIONER

## 2021-12-24 PROCEDURE — 94761 N-INVAS EAR/PLS OXIMETRY MLT: CPT

## 2021-12-24 PROCEDURE — 99900035 HC TECH TIME PER 15 MIN (STAT)

## 2021-12-24 RX ADMIN — PANTOPRAZOLE SODIUM 40 MG: 40 TABLET, DELAYED RELEASE ORAL at 08:12

## 2021-12-24 RX ADMIN — ROPINIROLE 2 MG: 1 TABLET, FILM COATED ORAL at 08:12

## 2021-12-24 RX ADMIN — POLYETHYLENE GLYCOL 3350 17 G: 17 POWDER, FOR SOLUTION ORAL at 08:12

## 2021-12-24 RX ADMIN — CHLORTHALIDONE 25 MG: 25 TABLET ORAL at 08:12

## 2021-12-24 RX ADMIN — FUROSEMIDE 40 MG: 40 TABLET ORAL at 08:12

## 2021-12-24 RX ADMIN — VENLAFAXINE HYDROCHLORIDE 150 MG: 75 CAPSULE, EXTENDED RELEASE ORAL at 08:12

## 2021-12-24 RX ADMIN — TRAZODONE HYDROCHLORIDE 50 MG: 50 TABLET ORAL at 08:12

## 2021-12-24 RX ADMIN — LEVOTHYROXINE SODIUM 112 MCG: 0.11 TABLET ORAL at 05:12

## 2021-12-24 RX ADMIN — ROPINIROLE 2 MG: 1 TABLET, FILM COATED ORAL at 01:12

## 2021-12-24 RX ADMIN — LOSARTAN POTASSIUM 100 MG: 100 TABLET, FILM COATED ORAL at 08:12

## 2021-12-24 RX ADMIN — ENOXAPARIN SODIUM 30 MG: 30 INJECTION SUBCUTANEOUS at 05:12

## 2021-12-24 RX ADMIN — POTASSIUM BICARBONATE 25 MEQ: 977.5 TABLET, EFFERVESCENT ORAL at 08:12

## 2021-12-25 PROCEDURE — 63600175 PHARM REV CODE 636 W HCPCS: Performed by: HOSPITALIST

## 2021-12-25 PROCEDURE — 25000003 PHARM REV CODE 250: Performed by: NURSE PRACTITIONER

## 2021-12-25 PROCEDURE — 25000003 PHARM REV CODE 250: Performed by: HOSPITALIST

## 2021-12-25 PROCEDURE — 94761 N-INVAS EAR/PLS OXIMETRY MLT: CPT

## 2021-12-25 PROCEDURE — 27000958

## 2021-12-25 PROCEDURE — 11000004 HC SNF PRIVATE

## 2021-12-25 RX ADMIN — VENLAFAXINE HYDROCHLORIDE 150 MG: 75 CAPSULE, EXTENDED RELEASE ORAL at 09:12

## 2021-12-25 RX ADMIN — ACETAMINOPHEN 1000 MG: 500 TABLET, FILM COATED ORAL at 12:12

## 2021-12-25 RX ADMIN — POLYETHYLENE GLYCOL 3350 17 G: 17 POWDER, FOR SOLUTION ORAL at 09:12

## 2021-12-25 RX ADMIN — PANTOPRAZOLE SODIUM 40 MG: 40 TABLET, DELAYED RELEASE ORAL at 09:12

## 2021-12-25 RX ADMIN — POTASSIUM BICARBONATE 25 MEQ: 977.5 TABLET, EFFERVESCENT ORAL at 09:12

## 2021-12-25 RX ADMIN — ROPINIROLE 2 MG: 1 TABLET, FILM COATED ORAL at 09:12

## 2021-12-25 RX ADMIN — FUROSEMIDE 40 MG: 40 TABLET ORAL at 09:12

## 2021-12-25 RX ADMIN — CHLORTHALIDONE 25 MG: 25 TABLET ORAL at 09:12

## 2021-12-25 RX ADMIN — ENOXAPARIN SODIUM 30 MG: 30 INJECTION SUBCUTANEOUS at 04:12

## 2021-12-25 RX ADMIN — LOSARTAN POTASSIUM 100 MG: 100 TABLET, FILM COATED ORAL at 09:12

## 2021-12-25 RX ADMIN — ROPINIROLE 2 MG: 1 TABLET, FILM COATED ORAL at 04:12

## 2021-12-25 RX ADMIN — LEVOTHYROXINE SODIUM 112 MCG: 0.11 TABLET ORAL at 05:12

## 2021-12-26 PROCEDURE — 94761 N-INVAS EAR/PLS OXIMETRY MLT: CPT

## 2021-12-26 PROCEDURE — 11000004 HC SNF PRIVATE

## 2021-12-26 PROCEDURE — 97110 THERAPEUTIC EXERCISES: CPT | Mod: CQ

## 2021-12-26 PROCEDURE — 25000003 PHARM REV CODE 250: Performed by: NURSE PRACTITIONER

## 2021-12-26 PROCEDURE — 63600175 PHARM REV CODE 636 W HCPCS: Performed by: HOSPITALIST

## 2021-12-26 PROCEDURE — 97116 GAIT TRAINING THERAPY: CPT | Mod: CQ

## 2021-12-26 PROCEDURE — 27000958

## 2021-12-26 PROCEDURE — 25000003 PHARM REV CODE 250: Performed by: HOSPITALIST

## 2021-12-26 RX ADMIN — ROPINIROLE 2 MG: 1 TABLET, FILM COATED ORAL at 08:12

## 2021-12-26 RX ADMIN — PANTOPRAZOLE SODIUM 40 MG: 40 TABLET, DELAYED RELEASE ORAL at 11:12

## 2021-12-26 RX ADMIN — ENOXAPARIN SODIUM 30 MG: 30 INJECTION SUBCUTANEOUS at 05:12

## 2021-12-26 RX ADMIN — CHLORTHALIDONE 25 MG: 25 TABLET ORAL at 11:12

## 2021-12-26 RX ADMIN — VENLAFAXINE HYDROCHLORIDE 150 MG: 75 CAPSULE, EXTENDED RELEASE ORAL at 11:12

## 2021-12-26 RX ADMIN — ROPINIROLE 2 MG: 1 TABLET, FILM COATED ORAL at 02:12

## 2021-12-26 RX ADMIN — TRAZODONE HYDROCHLORIDE 50 MG: 50 TABLET ORAL at 08:12

## 2021-12-26 RX ADMIN — ROPINIROLE 2 MG: 1 TABLET, FILM COATED ORAL at 11:12

## 2021-12-26 RX ADMIN — POTASSIUM BICARBONATE 25 MEQ: 977.5 TABLET, EFFERVESCENT ORAL at 11:12

## 2021-12-26 RX ADMIN — POLYETHYLENE GLYCOL 3350 17 G: 17 POWDER, FOR SOLUTION ORAL at 11:12

## 2021-12-26 RX ADMIN — LOSARTAN POTASSIUM 100 MG: 100 TABLET, FILM COATED ORAL at 11:12

## 2021-12-26 RX ADMIN — FUROSEMIDE 40 MG: 40 TABLET ORAL at 11:12

## 2021-12-26 RX ADMIN — LEVOTHYROXINE SODIUM 112 MCG: 0.11 TABLET ORAL at 05:12

## 2021-12-27 VITALS
WEIGHT: 138.69 LBS | SYSTOLIC BLOOD PRESSURE: 117 MMHG | HEART RATE: 81 BPM | BODY MASS INDEX: 25.52 KG/M2 | OXYGEN SATURATION: 97 % | TEMPERATURE: 97 F | RESPIRATION RATE: 20 BRPM | HEIGHT: 62 IN | DIASTOLIC BLOOD PRESSURE: 63 MMHG

## 2021-12-27 PROBLEM — N28.9 ACUTE RENAL INSUFFICIENCY: Status: RESOLVED | Noted: 2021-12-03 | Resolved: 2021-12-27

## 2021-12-27 PROBLEM — R13.10 DYSPHAGIA: Status: RESOLVED | Noted: 2021-12-03 | Resolved: 2021-12-27

## 2021-12-27 PROBLEM — Z91.89 AT HIGH RISK FOR VENOUS THROMBOEMBOLISM (VTE): Status: RESOLVED | Noted: 2021-11-19 | Resolved: 2021-12-27

## 2021-12-27 PROBLEM — N30.00 ACUTE CYSTITIS WITHOUT HEMATURIA: Status: RESOLVED | Noted: 2021-12-15 | Resolved: 2021-12-27

## 2021-12-27 PROCEDURE — 97110 THERAPEUTIC EXERCISES: CPT

## 2021-12-27 PROCEDURE — 99316 NF DSCHRG MGMT 30 MIN+: CPT | Mod: ,,, | Performed by: HOSPITALIST

## 2021-12-27 PROCEDURE — 27000958

## 2021-12-27 PROCEDURE — 97116 GAIT TRAINING THERAPY: CPT

## 2021-12-27 PROCEDURE — 25000003 PHARM REV CODE 250: Performed by: HOSPITALIST

## 2021-12-27 PROCEDURE — 25000003 PHARM REV CODE 250: Performed by: NURSE PRACTITIONER

## 2021-12-27 PROCEDURE — 99900035 HC TECH TIME PER 15 MIN (STAT)

## 2021-12-27 PROCEDURE — 99316 PR NURSING FAC DISCHRGE DAY,MORE 30 MIN: ICD-10-PCS | Mod: ,,, | Performed by: HOSPITALIST

## 2021-12-27 RX ORDER — FUROSEMIDE 40 MG/1
40 TABLET ORAL DAILY
Qty: 60 TABLET | Refills: 2
Start: 2021-12-27 | End: 2022-01-26

## 2021-12-27 RX ORDER — FUROSEMIDE 40 MG/1
40 TABLET ORAL DAILY
Qty: 30 TABLET | Refills: 11 | Status: SHIPPED | OUTPATIENT
Start: 2021-12-28 | End: 2022-12-28

## 2021-12-27 RX ORDER — PANTOPRAZOLE SODIUM 40 MG/1
40 TABLET, DELAYED RELEASE ORAL DAILY
Qty: 30 TABLET | Refills: 11 | Status: SHIPPED | OUTPATIENT
Start: 2021-12-28 | End: 2022-12-28

## 2021-12-27 RX ORDER — LEVOTHYROXINE SODIUM 112 UG/1
112 TABLET ORAL
Qty: 30 TABLET | Refills: 11 | Status: SHIPPED | OUTPATIENT
Start: 2021-12-28 | End: 2022-12-28

## 2021-12-27 RX ADMIN — ROPINIROLE 2 MG: 1 TABLET, FILM COATED ORAL at 09:12

## 2021-12-27 RX ADMIN — LOSARTAN POTASSIUM 100 MG: 100 TABLET, FILM COATED ORAL at 09:12

## 2021-12-27 RX ADMIN — VENLAFAXINE HYDROCHLORIDE 150 MG: 75 CAPSULE, EXTENDED RELEASE ORAL at 09:12

## 2021-12-27 RX ADMIN — LEVOTHYROXINE SODIUM 112 MCG: 0.11 TABLET ORAL at 05:12

## 2021-12-27 RX ADMIN — CHLORTHALIDONE 25 MG: 25 TABLET ORAL at 09:12

## 2021-12-27 RX ADMIN — PANTOPRAZOLE SODIUM 40 MG: 40 TABLET, DELAYED RELEASE ORAL at 09:12

## 2021-12-27 RX ADMIN — FUROSEMIDE 40 MG: 40 TABLET ORAL at 09:12

## 2022-02-06 DIAGNOSIS — J90 PLEURAL EFFUSION: ICD-10-CM

## 2022-02-06 DIAGNOSIS — I27.20 PULMONARY HYPERTENSION: ICD-10-CM

## 2022-02-06 DIAGNOSIS — J18.9 PNEUMONIA DUE TO INFECTIOUS ORGANISM, UNSPECIFIED LATERALITY, UNSPECIFIED PART OF LUNG: Primary | ICD-10-CM

## 2024-02-08 ENCOUNTER — HOSPITAL ENCOUNTER (INPATIENT)
Facility: HOSPITAL | Age: 84
LOS: 6 days | Discharge: HOME | DRG: 948 | End: 2024-02-14
Attending: HOSPITALIST | Admitting: HOSPITALIST
Payer: MEDICARE

## 2024-02-08 DIAGNOSIS — R53.1 WEAKNESS: ICD-10-CM

## 2024-02-08 PROCEDURE — 25000242 PHARM REV CODE 250 ALT 637 W/ HCPCS

## 2024-02-08 PROCEDURE — 99900035 HC TECH TIME PER 15 MIN (STAT)

## 2024-02-08 PROCEDURE — 27000221 HC OXYGEN, UP TO 24 HOURS

## 2024-02-08 PROCEDURE — 11000004 HC SNF PRIVATE

## 2024-02-08 PROCEDURE — 97166 OT EVAL MOD COMPLEX 45 MIN: CPT

## 2024-02-08 PROCEDURE — 27000958

## 2024-02-08 PROCEDURE — 11000001 HC ACUTE MED/SURG PRIVATE ROOM

## 2024-02-08 PROCEDURE — 25000003 PHARM REV CODE 250

## 2024-02-08 RX ORDER — VANCOMYCIN HYDROCHLORIDE 125 MG/1
125 CAPSULE ORAL 2 TIMES DAILY
Status: DISCONTINUED | OUTPATIENT
Start: 2024-02-22 | End: 2024-02-14 | Stop reason: HOSPADM

## 2024-02-08 RX ORDER — DILTIAZEM HYDROCHLORIDE 120 MG/1
120 CAPSULE, COATED, EXTENDED RELEASE ORAL DAILY
Status: DISCONTINUED | OUTPATIENT
Start: 2024-02-09 | End: 2024-02-14 | Stop reason: HOSPADM

## 2024-02-08 RX ORDER — ONDANSETRON 4 MG/1
4 TABLET, ORALLY DISINTEGRATING ORAL EVERY 8 HOURS PRN
Status: DISCONTINUED | OUTPATIENT
Start: 2024-02-08 | End: 2024-02-14 | Stop reason: HOSPADM

## 2024-02-08 RX ORDER — TRAZODONE HYDROCHLORIDE 100 MG/1
100 TABLET ORAL NIGHTLY
Status: DISCONTINUED | OUTPATIENT
Start: 2024-02-08 | End: 2024-02-14 | Stop reason: HOSPADM

## 2024-02-08 RX ORDER — GENTAMICIN SULFATE 3 MG/ML
1 SOLUTION/ DROPS OPHTHALMIC
Status: DISCONTINUED | OUTPATIENT
Start: 2024-02-09 | End: 2024-02-14 | Stop reason: HOSPADM

## 2024-02-08 RX ORDER — ACETAMINOPHEN 325 MG/1
650 TABLET ORAL EVERY 6 HOURS PRN
Status: DISCONTINUED | OUTPATIENT
Start: 2024-02-08 | End: 2024-02-14 | Stop reason: HOSPADM

## 2024-02-08 RX ORDER — CALCIUM CARBONATE 200(500)MG
500 TABLET,CHEWABLE ORAL 2 TIMES DAILY PRN
Status: DISCONTINUED | OUTPATIENT
Start: 2024-02-08 | End: 2024-02-14 | Stop reason: HOSPADM

## 2024-02-08 RX ORDER — BUDESONIDE 0.5 MG/2ML
0.5 INHALANT ORAL EVERY 12 HOURS
Status: DISCONTINUED | OUTPATIENT
Start: 2024-02-08 | End: 2024-02-14 | Stop reason: HOSPADM

## 2024-02-08 RX ORDER — ERGOCALCIFEROL 1.25 MG/1
50000 CAPSULE ORAL
Status: DISCONTINUED | OUTPATIENT
Start: 2024-02-15 | End: 2024-02-14 | Stop reason: HOSPADM

## 2024-02-08 RX ORDER — VANCOMYCIN HYDROCHLORIDE 125 MG/1
125 CAPSULE ORAL DAILY
Status: DISCONTINUED | OUTPATIENT
Start: 2024-03-01 | End: 2024-02-14 | Stop reason: HOSPADM

## 2024-02-08 RX ORDER — THYROID 60 MG/1
120 TABLET ORAL
Status: DISCONTINUED | OUTPATIENT
Start: 2024-02-09 | End: 2024-02-14 | Stop reason: HOSPADM

## 2024-02-08 RX ORDER — AMOXICILLIN 250 MG
1 CAPSULE ORAL 2 TIMES DAILY PRN
Status: DISCONTINUED | OUTPATIENT
Start: 2024-02-08 | End: 2024-02-14 | Stop reason: HOSPADM

## 2024-02-08 RX ORDER — DULOXETIN HYDROCHLORIDE 60 MG/1
60 CAPSULE, DELAYED RELEASE ORAL DAILY
Status: DISCONTINUED | OUTPATIENT
Start: 2024-02-09 | End: 2024-02-13

## 2024-02-08 RX ORDER — VANCOMYCIN HYDROCHLORIDE 125 MG/1
125 CAPSULE ORAL EVERY 6 HOURS
Status: DISCONTINUED | OUTPATIENT
Start: 2024-02-08 | End: 2024-02-14 | Stop reason: HOSPADM

## 2024-02-08 RX ORDER — ALBUTEROL SULFATE 0.83 MG/ML
2.5 SOLUTION RESPIRATORY (INHALATION) EVERY 4 HOURS PRN
Status: DISCONTINUED | OUTPATIENT
Start: 2024-02-08 | End: 2024-02-14 | Stop reason: HOSPADM

## 2024-02-08 RX ORDER — PANTOPRAZOLE SODIUM 40 MG/1
40 TABLET, DELAYED RELEASE ORAL DAILY
Status: DISCONTINUED | OUTPATIENT
Start: 2024-02-09 | End: 2024-02-14 | Stop reason: HOSPADM

## 2024-02-08 RX ORDER — IPRATROPIUM BROMIDE AND ALBUTEROL SULFATE 2.5; .5 MG/3ML; MG/3ML
3 SOLUTION RESPIRATORY (INHALATION)
Status: DISCONTINUED | OUTPATIENT
Start: 2024-02-08 | End: 2024-02-14 | Stop reason: HOSPADM

## 2024-02-08 RX ORDER — ROPINIROLE 1 MG/1
2 TABLET, FILM COATED ORAL DAILY
Status: DISCONTINUED | OUTPATIENT
Start: 2024-02-09 | End: 2024-02-14 | Stop reason: HOSPADM

## 2024-02-08 RX ORDER — ROPINIROLE 1 MG/1
4 TABLET, FILM COATED ORAL NIGHTLY
Status: DISCONTINUED | OUTPATIENT
Start: 2024-02-08 | End: 2024-02-14 | Stop reason: HOSPADM

## 2024-02-08 RX ORDER — LANOLIN ALCOHOL/MO/W.PET/CERES
1 CREAM (GRAM) TOPICAL DAILY
Status: DISCONTINUED | OUTPATIENT
Start: 2024-02-09 | End: 2024-02-14 | Stop reason: HOSPADM

## 2024-02-08 RX ORDER — TALC
6 POWDER (GRAM) TOPICAL NIGHTLY PRN
Status: DISCONTINUED | OUTPATIENT
Start: 2024-02-08 | End: 2024-02-14 | Stop reason: HOSPADM

## 2024-02-08 RX ORDER — VANCOMYCIN HYDROCHLORIDE 125 MG/1
125 CAPSULE ORAL EVERY OTHER DAY
Status: DISCONTINUED | OUTPATIENT
Start: 2024-03-09 | End: 2024-02-14 | Stop reason: HOSPADM

## 2024-02-08 RX ORDER — ESTRADIOL 1 MG/1
1 TABLET ORAL DAILY
Status: DISCONTINUED | OUTPATIENT
Start: 2024-02-09 | End: 2024-02-13

## 2024-02-08 RX ORDER — CYANOCOBALAMIN 1000 UG/ML
1000 INJECTION, SOLUTION INTRAMUSCULAR; SUBCUTANEOUS
Status: DISCONTINUED | OUTPATIENT
Start: 2024-03-08 | End: 2024-02-14 | Stop reason: HOSPADM

## 2024-02-08 RX ORDER — SERTRALINE HYDROCHLORIDE 25 MG/1
50 TABLET, FILM COATED ORAL DAILY
Status: DISCONTINUED | OUTPATIENT
Start: 2024-02-09 | End: 2024-02-09

## 2024-02-08 RX ADMIN — APIXABAN 5 MG: 5 TABLET, FILM COATED ORAL at 08:02

## 2024-02-08 RX ADMIN — FUROSEMIDE 60 MG: 40 TABLET ORAL at 05:02

## 2024-02-08 RX ADMIN — ROPINIROLE HYDROCHLORIDE 4 MG: 1 TABLET, FILM COATED ORAL at 08:02

## 2024-02-08 RX ADMIN — IPRATROPIUM BROMIDE AND ALBUTEROL SULFATE 3 ML: 2.5; .5 SOLUTION RESPIRATORY (INHALATION) at 07:02

## 2024-02-08 RX ADMIN — BUDESONIDE INHALATION 0.5 MG: 0.5 SUSPENSION RESPIRATORY (INHALATION) at 07:02

## 2024-02-08 RX ADMIN — TRAZODONE HYDROCHLORIDE 100 MG: 100 TABLET ORAL at 08:02

## 2024-02-08 RX ADMIN — VANCOMYCIN HYDROCHLORIDE 125 MG: 125 CAPSULE ORAL at 11:02

## 2024-02-08 RX ADMIN — ACETAMINOPHEN 650 MG: 325 TABLET ORAL at 05:02

## 2024-02-08 RX ADMIN — MELATONIN 6 MG: at 08:02

## 2024-02-08 RX ADMIN — VANCOMYCIN HYDROCHLORIDE 125 MG: 125 CAPSULE ORAL at 05:02

## 2024-02-08 NOTE — PT/OT/SLP EVAL
Occupational Therapy   Evaluation    Name: Courtney Pickens  MRN: [id number]  Admitting Diagnosis: weakness  Recent Surgery: * No surgery found *      Recommendations:     Discharge Recommendations:    Discharge Equipment Recommendations:   (TBD)  Barriers to discharge:  None    Assessment:     Courtney Pickens is a 83 y.o. female with a medical diagnosis of weakness.  She presents with wanting to get up to brush her teeth, oxygen in place, pt aware. Performance deficits affecting function: weakness, impaired endurance, impaired self care skills, impaired functional mobility, gait instability, impaired balance, impaired cognition, decreased safety awareness, edema, impaired cardiopulmonary response to activity.      Rehab Prognosis: Fair and due to safety issues ; patient would benefit from acute skilled OT services to address these deficits and reach maximum level of function.       Plan:     Patient to be seen 5 x/week to address the above listed problems via self-care/home management, therapeutic activities, therapeutic exercises  Plan of Care Expires: 02/29/24  Plan of Care Reviewed with: patient, caregiver    Subjective     Chief Complaint: leg swelling and salt restrictions to reduce edema  Patient/Family Comments/goals: pt DIL states they would like for her to be able to go home alone again, but she is becoming increasingly unsafe at home alone.  They may seek assisted living.  Unclear at this time.    Occupational Profile:  Living Environment: lives alone at home, intermittent svn needed up until this episode of illness  Previous level of function: mod I to independent at home, but increasingly unsafe at home  Roles and Routines: needs svn to leave home and is no longer safe to drive per DIL  Equipment Used at Home: shower chair, cane, straight, oxygen  Assistance upon Discharge: TBD    Pain/Comfort:  Pain Rating 1: 0/10    Patients cultural, spiritual, Roman Catholic conflicts given the current situation:   "    Objective:     Communicated with: pt and DIL prior to session.  Patient found HOB elevated with oxygen upon OT entry to room.  Initially, pt did not want to do anything with OT, but she then decided she wanted to brush her teeth, so OT was able to complete evaluation today    General Precautions: Standard,    Orthopedic Precautions: Full weight bearing  Braces: N/A  Respiratory Status: Nasal cannula, flow 2 L/min    Occupational Performance:    Bed Mobility:    Patient completed Rolling/Turning to Left with  minimum assistance  Patient completed Rolling/Turning to Right with moderate assistance  Patient completed Scooting/Bridging with minimum assistance  Patient completed Supine to Sit with minimum assistance  Patient completed Sit to Supine with moderate assistance    Functional Mobility/Transfers:  Patient completed Sit <> Stand Transfer with minimum assistance  with  straight cane   Functional Mobility: walked from bed to sinkside to brush teeth; cane used and pt aware of needing to bring oxygen tubing along safely.  CGA provided to pt during ambulation    Activities of Daily Living:  Feeding:  stand by assistance pt is experiencing some "choking" on thin liquids.  Pt has thickener at bedside.  Needs further evaluation at this time.  Grooming: stand by assistance cueing needed, pt completed oral hygiene and hair brushing with just SBA and cueing for safety and sequencing  Bathing: moderate assistance for LB mainly   Upper Body Dressing: minimum assistance bedside  Lower Body Dressing: moderate assistance bedside  Toileting: minimum assistance and moderate assistance (bladder/bowel for thoroughness)    Cognitive/Visual Perceptual:  Cognitive/Psychosocial Skills:     -       Oriented to: Person   -       Follows Commands/attention:Follows two-step commands  -       Communication: clear/fluent  -       Memory: Impaired STM, Impaired LTM, and Poor immediate recall  -       Safety awareness/insight to disability: " "impaired   -       Mood/Affect/Coping skills/emotional control: Anxious and Guarded    Physical Exam:  Balance:    -       sitting upright EOB soft surface nonsupported=SBA, static standing=SBA, dynamic standing=CGA  Dominant hand:    -       right  Upper Extremity Range of Motion:     -       Right Upper Extremity: WFL  -       Left Upper Extremity: WFL  Upper Extremity Strength:    -       Right Upper Extremity: 3+/5  -       Left Upper Extremity: 3+/5   Strength:    -       Right Upper Extremity: WFL  -       Left Upper Extremity: WFL  Fine Motor Coordination:    -       Intact  Gross motor coordination:   WFL    AMPAC 6 Click ADL:  AMPAC Total Score: 18    Treatment & Education:  EVALUATION completed today    Pt educated on OT role/POC.   Importance of OOB activity with staff assistance.  Importance of sitting up in the chair throughout the day as tolerated, especially for meals   Safety during functional t/f and mobility with use of straight cane and SVN  Importance of assisting with self-care activities   All questions/concerns answered within OT scope of practice     DIL states, "please do not let her give you no for an answer; she desperately needs OT and PT to be able to get home and live alone again. Call me if she tells you no.  I will come and make sure she participates"    Patient left HOB elevated with call button in reach and DIL present    GOALS:   Multidisciplinary Problems       Occupational Therapy Goals          Problem: Occupational Therapy    Goal Priority Disciplines Outcome Interventions   Occupational Therapy Goal     OT, PT/OT Ongoing, Progressing    Description: STG: within 2 weeks  Pt will perform grooming with setup and independence  Pt will bathe with SVN  Pt will perform UE dressing with SVN  Pt will perform LE dressing with SVN  Pt will sit EOB x 30 min with SVN assistance  Pt will transfer bed/chair/bsc with SVN  Pt will perform standing task x 5 min with SVN assistance  Pt will " tolerate 35 minutes of tx without fatigue      LT.Restore to max I with self care and mobility.                         History:     Past Medical History:   Diagnosis Date    Anxiety     Ocasio's esophagus     CHF (congestive heart failure)     Depression     GERD (gastroesophageal reflux disease)     Hypothyroidism     ILYA (obstructive sleep apnea)     Pneumonia     Pulmonary HTN          Past Surgical History:   Procedure Laterality Date    CATARACT EXTRACTION      CHOLECYSTECTOMY      HYSTERECTOMY         Time Tracking:     OT Date of Treatment: 24  OT Start Time: 1530  OT Stop Time: 1550  OT Total Time (min): 20 min    Billable Minutes:Evaluation 20    2024

## 2024-02-08 NOTE — PLAN OF CARE
Problem: Occupational Therapy  Goal: Occupational Therapy Goal  Description: STG: within 2 weeks  Pt will perform grooming with setup and independence  Pt will bathe with SVN  Pt will perform UE dressing with SVN  Pt will perform LE dressing with SVN  Pt will sit EOB x 30 min with SVN assistance  Pt will transfer bed/chair/bsc with SVN  Pt will perform standing task x 5 min with SVN assistance  Pt will tolerate 35 minutes of tx without fatigue      LT.Restore to max I with self care and mobility.    Outcome: Ongoing, Progressing

## 2024-02-08 NOTE — PLAN OF CARE
[facility]  Initial Discharge Assessment       Primary Care Provider: Bertha Mccall FNP    Admission Diagnosis: Weakness [R53.1]    Admission Date: 2/8/2024  Expected Discharge Date:     Transition of Care Barriers: None    Payor: MEDICARE / Plan: MEDICARE PART A & B / Product Type: Government /     Extended Emergency Contact Information  Primary Emergency Contact: Kristen Danielson  Mobile Phone: [phone]  Relation: Daughter  Preferred language: English   needed? No    Discharge Plan A: Home Health  Discharge Plan B: Assisted Living      [id number] - [place], [place] - [address] [facility]  [address]  [place] [address]  Phone: [phone] Fax: [phone]      Initial Assessment (most recent)       Adult Discharge Assessment - 02/08/24 1438          Discharge Assessment    Assessment Type Discharge Planning Assessment     Confirmed/corrected address, phone number and insurance Yes     Source of Information patient;family     Communicated DOREEN with patient/caregiver Date not available/Unable to determine     Reason For Admission inpatient PT/OT     People in Home alone     Facility Arrived From: [facility]     Do you expect to return to your current living situation? Yes     Do you have help at home or someone to help you manage your care at home? Yes     Who are your caregiver(s) and their phone number(s)? Nuvia Pickens     Prior to hospitilization cognitive status: Unable to Assess     Current cognitive status: Not Oriented to Place     Walking or Climbing Stairs Difficulty yes     Walking or Climbing Stairs ambulation difficulty, requires equipment     Dressing/Bathing Difficulty yes     Dressing/Bathing dressing difficulty, assistance 1 person     Home Accessibility wheelchair accessible     Home Layout Able to live on 1st floor     Equipment Currently Used at Home cane, straight;oxygen;shower chair     Readmission within 30 days? No      Patient currently being followed by outpatient case management? No     Do you currently have service(s) that help you manage your care at home? Yes     Is the pt/caregiver preference to resume services with current agency Yes     Do you take prescription medications? Yes     Do you have prescription coverage? Yes     Do you have any problems affording any of your prescribed medications? No     Is the patient taking medications as prescribed? yes     How do you get to doctors appointments? family or friend will provide     Are you on dialysis? No     Do you take coumadin? No     Discharge Plan A Home Health     Discharge Plan B Assisted Living     DME Needed Upon Discharge  other (see comments)   tbd    Discharge Plan discussed with: Patient;Adult children     Transition of Care Barriers None        Physical Activity    On average, how many days per week do you engage in moderate to strenuous exercise (like a brisk walk)? 0 days     On average, how many minutes do you engage in exercise at this level? 0 min        Financial Resource Strain    How hard is it for you to pay for the very basics like food, housing, medical care, and heating? Not hard at all        Housing Stability    In the last 12 months, was there a time when you were not able to pay the mortgage or rent on time? No     In the last 12 months, how many places have you lived? 1     In the last 12 months, was there a time when you did not have a steady place to sleep or slept in a shelter (including now)? No        Transportation Needs    In the past 12 months, has lack of transportation kept you from medical appointments or from getting medications? No     In the past 12 months, has lack of transportation kept you from meetings, work, or from getting things needed for daily living? No        Food Insecurity    Within the past 12 months, you worried that your food would run out before you got the money to buy more. Never true     Within the past 12  months, the food you bought just didn't last and you didn't have money to get more. Never true        Stress    Do you feel stress - tense, restless, nervous, or anxious, or unable to sleep at night because your mind is troubled all the time - these days? Only a little        Social Connections    In a typical week, how many times do you talk on the phone with family, friends, or neighbors? More than three times a week     How often do you get together with friends or relatives? More than three times a week     How often do you attend Caodaism or Buddhism services? More than 4 times per year     Do you belong to any clubs or organizations such as Caodaism groups, unions, fraternal or athletic groups, or school groups? No     How often do you attend meetings of the clubs or organizations you belong to? Never     Are you , , , , never , or living with a partner?         Alcohol Use    Q1: How often do you have a drink containing alcohol? Never     Q2: How many drinks containing alcohol do you have on a typical day when you are drinking? Patient does not drink     Q3: How often do you have six or more drinks on one occasion? Never                   Ms. Pickens admitted to [facility] for inpatient PT/OT. Family reports she lives alone and she and her  help take care of her. She states they have considered placing patient at the [facility] in [place] but those arrangements have not been made. Will follow for dc needs

## 2024-02-08 NOTE — NURSING
Redness noted to perineal area and buttock photograph taken prevention dressing applied to BLE heels and buttock.

## 2024-02-09 PROBLEM — F03.90 DEMENTIA WITHOUT BEHAVIORAL DISTURBANCE: Status: ACTIVE | Noted: 2024-02-09

## 2024-02-09 PROBLEM — E03.9 HYPOTHYROID: Status: ACTIVE | Noted: 2021-12-06

## 2024-02-09 PROBLEM — R53.1 WEAKNESS: Status: ACTIVE | Noted: 2024-02-09

## 2024-02-09 LAB
ANION GAP SERPL CALCULATED.3IONS-SCNC: 13 MMOL/L (ref 7–16)
BASOPHILS # BLD AUTO: 0.05 K/UL (ref 0–0.2)
BASOPHILS NFR BLD AUTO: 0.7 % (ref 0–1)
BUN SERPL-MCNC: 25 MG/DL (ref 7–18)
BUN/CREAT SERPL: 16 (ref 6–20)
CALCIUM SERPL-MCNC: 8.8 MG/DL (ref 8.5–10.1)
CHLORIDE SERPL-SCNC: 94 MMOL/L (ref 98–107)
CO2 SERPL-SCNC: 29 MMOL/L (ref 21–32)
CREAT SERPL-MCNC: 1.55 MG/DL (ref 0.55–1.02)
DIFFERENTIAL METHOD BLD: ABNORMAL
EGFR (NO RACE VARIABLE) (RUSH/TITUS): 33 ML/MIN/1.73M2
EOSINOPHIL # BLD AUTO: 0.17 K/UL (ref 0–0.5)
EOSINOPHIL NFR BLD AUTO: 2.4 % (ref 1–4)
ERYTHROCYTE [DISTWIDTH] IN BLOOD BY AUTOMATED COUNT: 15.2 % (ref 11.5–14.5)
GLUCOSE SERPL-MCNC: 104 MG/DL (ref 74–106)
HCT VFR BLD AUTO: 34.3 % (ref 38–47)
HGB BLD-MCNC: 11.1 G/DL (ref 12–16)
LYMPHOCYTES # BLD AUTO: 0.98 K/UL (ref 1–4.8)
LYMPHOCYTES NFR BLD AUTO: 13.9 % (ref 27–41)
MCH RBC QN AUTO: 31 PG (ref 27–31)
MCHC RBC AUTO-ENTMCNC: 32.4 G/DL (ref 32–36)
MCV RBC AUTO: 95.8 FL (ref 80–96)
MONOCYTES # BLD AUTO: 0.85 K/UL (ref 0–0.8)
MONOCYTES NFR BLD AUTO: 12.1 % (ref 2–6)
MPC BLD CALC-MCNC: 9.4 FL (ref 9.4–12.4)
NEUTROPHILS # BLD AUTO: 4.99 K/UL (ref 1.8–7.7)
NEUTROPHILS NFR BLD AUTO: 70.9 % (ref 53–65)
PLATELET # BLD AUTO: 211 K/UL (ref 150–400)
POTASSIUM SERPL-SCNC: 2.9 MMOL/L (ref 3.5–5.1)
RBC # BLD AUTO: 3.58 M/UL (ref 4.2–5.4)
SODIUM SERPL-SCNC: 133 MMOL/L (ref 136–145)
WBC # BLD AUTO: 7.04 K/UL (ref 4.5–11)

## 2024-02-09 PROCEDURE — 97530 THERAPEUTIC ACTIVITIES: CPT

## 2024-02-09 PROCEDURE — 97163 PT EVAL HIGH COMPLEX 45 MIN: CPT

## 2024-02-09 PROCEDURE — 94761 N-INVAS EAR/PLS OXIMETRY MLT: CPT

## 2024-02-09 PROCEDURE — 11000004 HC SNF PRIVATE

## 2024-02-09 PROCEDURE — 27000221 HC OXYGEN, UP TO 24 HOURS

## 2024-02-09 PROCEDURE — 25000003 PHARM REV CODE 250

## 2024-02-09 PROCEDURE — 92523 SPEECH SOUND LANG COMPREHEN: CPT

## 2024-02-09 PROCEDURE — 80048 BASIC METABOLIC PNL TOTAL CA: CPT

## 2024-02-09 PROCEDURE — 25000242 PHARM REV CODE 250 ALT 637 W/ HCPCS

## 2024-02-09 PROCEDURE — 94640 AIRWAY INHALATION TREATMENT: CPT

## 2024-02-09 PROCEDURE — 99900035 HC TECH TIME PER 15 MIN (STAT)

## 2024-02-09 PROCEDURE — 97110 THERAPEUTIC EXERCISES: CPT

## 2024-02-09 PROCEDURE — 85025 COMPLETE CBC W/AUTO DIFF WBC: CPT

## 2024-02-09 PROCEDURE — 27000958

## 2024-02-09 PROCEDURE — 99305 1ST NF CARE MODERATE MDM 35: CPT | Mod: AI,,, | Performed by: HOSPITALIST

## 2024-02-09 PROCEDURE — 25000003 PHARM REV CODE 250: Performed by: NURSE PRACTITIONER

## 2024-02-09 PROCEDURE — 92610 EVALUATE SWALLOWING FUNCTION: CPT

## 2024-02-09 PROCEDURE — 25000003 PHARM REV CODE 250: Performed by: HOSPITALIST

## 2024-02-09 RX ORDER — POTASSIUM CHLORIDE 20 MEQ/1
40 TABLET, EXTENDED RELEASE ORAL ONCE
Status: COMPLETED | OUTPATIENT
Start: 2024-02-09 | End: 2024-02-09

## 2024-02-09 RX ORDER — POTASSIUM CHLORIDE 750 MG/1
20 CAPSULE, EXTENDED RELEASE ORAL 2 TIMES DAILY
Status: DISCONTINUED | OUTPATIENT
Start: 2024-02-09 | End: 2024-02-14 | Stop reason: HOSPADM

## 2024-02-09 RX ORDER — SERTRALINE HYDROCHLORIDE 25 MG/1
25 TABLET, FILM COATED ORAL DAILY
Status: DISCONTINUED | OUTPATIENT
Start: 2024-02-10 | End: 2024-02-14 | Stop reason: HOSPADM

## 2024-02-09 RX ORDER — POTASSIUM CHLORIDE 20 MEQ/1
40 TABLET, EXTENDED RELEASE ORAL DAILY
Status: DISCONTINUED | OUTPATIENT
Start: 2024-02-09 | End: 2024-02-09

## 2024-02-09 RX ADMIN — DILTIAZEM HYDROCHLORIDE 120 MG: 120 CAPSULE, COATED, EXTENDED RELEASE ORAL at 08:02

## 2024-02-09 RX ADMIN — APIXABAN 2.5 MG: 2.5 TABLET, FILM COATED ORAL at 09:02

## 2024-02-09 RX ADMIN — FUROSEMIDE 60 MG: 40 TABLET ORAL at 08:02

## 2024-02-09 RX ADMIN — POTASSIUM CHLORIDE 20 MEQ: 1500 TABLET, EXTENDED RELEASE ORAL at 07:02

## 2024-02-09 RX ADMIN — THYROID, PORCINE 120 MG: 60 TABLET ORAL at 09:02

## 2024-02-09 RX ADMIN — FUROSEMIDE 60 MG: 40 TABLET ORAL at 05:02

## 2024-02-09 RX ADMIN — VANCOMYCIN HYDROCHLORIDE 125 MG: 125 CAPSULE ORAL at 05:02

## 2024-02-09 RX ADMIN — ROPINIROLE HYDROCHLORIDE 4 MG: 1 TABLET, FILM COATED ORAL at 09:02

## 2024-02-09 RX ADMIN — TRAZODONE HYDROCHLORIDE 100 MG: 100 TABLET ORAL at 09:02

## 2024-02-09 RX ADMIN — VANCOMYCIN HYDROCHLORIDE 125 MG: 125 CAPSULE ORAL at 12:02

## 2024-02-09 RX ADMIN — BUDESONIDE INHALATION 0.5 MG: 0.5 SUSPENSION RESPIRATORY (INHALATION) at 07:02

## 2024-02-09 RX ADMIN — GENTAMICIN SULFATE 1 DROP: 3 SOLUTION/ DROPS OPHTHALMIC at 10:02

## 2024-02-09 RX ADMIN — VANCOMYCIN HYDROCHLORIDE 125 MG: 125 CAPSULE ORAL at 09:02

## 2024-02-09 RX ADMIN — SERTRALINE HYDROCHLORIDE 50 MG: 25 TABLET ORAL at 08:02

## 2024-02-09 RX ADMIN — POTASSIUM CHLORIDE 40 MEQ: 1500 TABLET, EXTENDED RELEASE ORAL at 08:02

## 2024-02-09 RX ADMIN — IPRATROPIUM BROMIDE AND ALBUTEROL SULFATE 3 ML: 2.5; .5 SOLUTION RESPIRATORY (INHALATION) at 07:02

## 2024-02-09 RX ADMIN — POTASSIUM CHLORIDE 20 MEQ: 750 CAPSULE, EXTENDED RELEASE ORAL at 09:02

## 2024-02-09 RX ADMIN — ROPINIROLE HYDROCHLORIDE 2 MG: 1 TABLET, FILM COATED ORAL at 08:02

## 2024-02-09 RX ADMIN — ACETAMINOPHEN 650 MG: 325 TABLET ORAL at 03:02

## 2024-02-09 RX ADMIN — ESTRADIOL 1 MG: 1 TABLET ORAL at 08:02

## 2024-02-09 RX ADMIN — MELATONIN 6 MG: at 09:02

## 2024-02-09 RX ADMIN — FERROUS SULFATE TAB 325 MG (65 MG ELEMENTAL FE) 1 EACH: 325 (65 FE) TAB at 08:02

## 2024-02-09 RX ADMIN — IPRATROPIUM BROMIDE AND ALBUTEROL SULFATE 3 ML: 2.5; .5 SOLUTION RESPIRATORY (INHALATION) at 01:02

## 2024-02-09 RX ADMIN — GENTAMICIN SULFATE 1 DROP: 3 SOLUTION/ DROPS OPHTHALMIC at 09:02

## 2024-02-09 RX ADMIN — GENTAMICIN SULFATE 1 DROP: 3 SOLUTION/ DROPS OPHTHALMIC at 05:02

## 2024-02-09 RX ADMIN — DULOXETINE HYDROCHLORIDE 60 MG: 60 CAPSULE, DELAYED RELEASE ORAL at 08:02

## 2024-02-09 RX ADMIN — PANTOPRAZOLE SODIUM 40 MG: 40 TABLET, DELAYED RELEASE ORAL at 08:02

## 2024-02-09 RX ADMIN — GENTAMICIN SULFATE 1 DROP: 3 SOLUTION/ DROPS OPHTHALMIC at 03:02

## 2024-02-09 RX ADMIN — APIXABAN 5 MG: 5 TABLET, FILM COATED ORAL at 08:02

## 2024-02-09 NOTE — HPI
Mrs. Pickens is a pleasant 82 y/o  female with a PMHx of ILYA, Pulmonary HTN, Atrial Fibrillation, Thyroid Disease, Depression, Moderate Tricuspid Regurgitation, Moderate Aortic Regurgitation, COPD, and Venous Insufficiency was admitted to [facility] on 1/31/2024 for volume overload despite diuresis, kidney failure, metabolic encephalopathy and C-Difficle Colilitis.  Patient had a temporary dialysis catheter placed and underwent three rounds of hemodialysis before having a successful lasix trial. Patient was evaluated by Cardiology, echocardiogram showed a LVEF of 55-60% and worsening Tricuspid Regurgitation with a RSVP of 93 mmHg. Patient was placed on a 1,000 ml/24 hr fluid restriction. Patient requiring supplemental oxygen via nasal cannula at 2 LMP. Kidney function improved and anasarca resolved, patient was discharged here to [facility] for Inpatient [facility].

## 2024-02-09 NOTE — PLAN OF CARE
Problem: Fluid Imbalance (Pneumonia)  Goal: Fluid Balance  Outcome: Ongoing, Progressing     Problem: Fluid Imbalance (Pneumonia)  Goal: Fluid Balance  Outcome: Ongoing, Progressing     Problem: Fluid Imbalance (Pneumonia)  Goal: Fluid Balance  Outcome: Ongoing, Progressing     Problem: Fluid Imbalance (Pneumonia)  Goal: Fluid Balance  2/9/2024 1521 by Deya Evans, RRT  Outcome: Ongoing, Progressing  2/9/2024 1520 by Deya Evans, RRT  Outcome: Ongoing, Progressing

## 2024-02-09 NOTE — PT/OT/SLP PROGRESS
"Occupational Therapy   Treatment    Name: Courtney Pickens  MRN: [id number]  Admitting Diagnosis:  Weakness       Recommendations:     Discharge Recommendations:    Discharge Equipment Recommendations:   (TBD)  Barriers to discharge:  None    Assessment:     Courtney Pickens is a 83 y.o. female with a medical diagnosis of Weakness.  She presents with moderate resistance to any intervention during therapy.  Today, OT was able to persuade her to complete the activity/exercise below, not without pt having some outbursts of screaming and cursing. Performance deficits affecting function are weakness, impaired endurance, impaired self care skills, impaired functional mobility, gait instability, impaired balance, impaired cognition, decreased safety awareness, edema, impaired cardiopulmonary response to activity.     Rehab Prognosis:  Fair; patient would benefit from acute skilled OT services to address these deficits and reach maximum level of function.       Plan:     Patient to be seen 5 x/week to address the above listed problems via self-care/home management, therapeutic activities, therapeutic exercises  Plan of Care Expires: 02/29/24  Plan of Care Reviewed with: patient, caregiver    Subjective     Chief Complaint: eyes hurt  Patient/Family Comments/goals: pt DIL states, "she desperately needs PT and OT so she can go back home to her house where she lives alone.  She is fiercely independent."  Pain/Comfort:   Eyes hurting per her report    Objective:     Communicated with: pt, pharmacy, nursing, SLP prior to session.  Patient found up in chair with oxygen upon OT entry to room.    General Precautions: Standard,      Orthopedic Precautions:Full weight bearing  Braces: N/A  Respiratory Status: Nasal cannula, flow 2 L/min     Occupational Performance:     Bed Mobility:    Already up in w/c upon OT arrival    Functional Mobility/Transfers:  Did not attempt    Activities of Daily Living:  Not with OT today      [facility] 6 Click " "ADL:      Treatment & Education:  To improve endurance, strength, OT facilitated pt with:  UBE x  3 min x 2 sets with 1 to 2 RB's throughout, low level resist  RED PUTTY to simulate kitchen prep task and improve FM and , pt stating during this exercise, "I DESPISE this. This is for children!"    To improve reach, AROM, FM/ and trunk rotation with core forward leaning engagement:   seated Reciprocal pulleys x 3 min in sh flexion and abduction in attempts to complete 5 min timed exercise; only one direction accomplished in efforts to not agitate pt, so we can try a few more exercises.    Overall, OT was able to engage pt in exercise and activity sufficiently to produce some effect with progression toward strength training.  Pt requiring very limited stimulation to get upset, easily overwhelmed and angered.  DIL present to keep pt somewhat calmed.      Patient left up in chair awaiting PT with  PT staff and DIL present, pt placed on supplemental oxygen with nasal cannula on 2L.    GOALS:   Multidisciplinary Problems       Occupational Therapy Goals          Problem: Occupational Therapy    Goal Priority Disciplines Outcome Interventions   Occupational Therapy Goal     OT, PT/OT Ongoing, Progressing    Description: STG: within 2 weeks  Pt will perform grooming with setup and independence  Pt will bathe with SVN  Pt will perform UE dressing with SVN  Pt will perform LE dressing with SVN  Pt will sit EOB x 30 min with SVN assistance  Pt will transfer bed/chair/bsc with SVN  Pt will perform standing task x 5 min with SVN assistance  Pt will tolerate 35 minutes of tx without fatigue      LT.Restore to max I with self care and mobility.                         Time Tracking:     OT Date of Treatment: 24  OT Start Time: 1010  OT Stop Time: 1040  OT Total Time (min): 30 min    Billable Minutes:Therapeutic Activity 10  Therapeutic Exercise 20    OT/CRISTINA: OT          2024  "

## 2024-02-09 NOTE — PLAN OF CARE
Problem: Fall Injury Risk  Goal: Absence of Fall and Fall-Related Injury  Outcome: Ongoing, Progressing  Intervention: Identify and Manage Contributors  Flowsheets (Taken 2/9/2024 1733)  Self-Care Promotion:   independence encouraged   BADL personal objects within reach  Medication Review/Management: medications reviewed  Intervention: Promote Injury-Free Environment  Flowsheets (Taken 2/9/2024 1733)  Safety Promotion/Fall Prevention:   assistive device/personal item within reach   bed alarm set   chair alarm set   Fall Risk signage in place   medications reviewed   nonskid shoes/socks when out of bed   instructed to call staff for mobility   side rails raised x 2   /camera at bedside   room near unit station

## 2024-02-09 NOTE — H&P
[facility]  [facility]  History & Physical    Patient Name: Courtney Pickens  MRN: [id number]  Patient Class: IP- Swing  Admission Date: 2/8/2024  Attending Physician: Austin Valencia DO   Primary Care Provider: Bertha Mccall FNP         Patient information was obtained from past medical records and ER records.     Subjective:     Principal Problem:Weakness    Chief Complaint:   Chief Complaint   Patient presents with    Extremity Weakness        HPI: Mrs. Pickens is a pleasant 84 y/o  female with a PMHx of ILYA, Pulmonary HTN, Atrial Fibrillation, Thyroid Disease, Depression, Moderate Tricuspid Regurgitation, Moderate Aortic Regurgitation, COPD, and Venous Insufficiency was admitted to [facility] on 1/31/2024 for volume overload despite diuresis, kidney failure, metabolic encephalopathy and C-Difficle Colilitis.  Patient had a temporary dialysis catheter placed and underwent three rounds of hemodialysis before having a successful lasix trial. Patient was evaluated by Cardiology, echocardiogram showed a LVEF of 55-60% and worsening Tricuspid Regurgitation with a RSVP of 93 mmHg. Patient was placed on a 1,000 ml/24 hr fluid restriction. Patient requiring supplemental oxygen via nasal cannula at 2 LMP. Kidney function improved and anasarca resolved, patient was discharged here to [facility].     Past Medical History:   Diagnosis Date    Anxiety     Ocasio's esophagus     CHF (congestive heart failure)     Depression     GERD (gastroesophageal reflux disease)     Hypothyroidism     ILYA (obstructive sleep apnea)     Pneumonia     Pulmonary HTN        Past Surgical History:   Procedure Laterality Date    CATARACT EXTRACTION      CHOLECYSTECTOMY      HYSTERECTOMY         Review of patient's allergies indicates:   Allergen Reactions    Codeine     Diflucan [fluconazole] Hives    Lisinopril     Opioids - morphine  analogues        No current facility-administered medications on file prior to encounter.     Current Outpatient Medications on File Prior to Encounter   Medication Sig    chlorthalidone (HYGROTEN) 25 MG Tab Take 1 tablet (25 mg total) by mouth once daily.    furosemide (LASIX) 40 MG tablet Take 1 tablet (40 mg total) by mouth once daily.    levothyroxine (SYNTHROID) 112 MCG tablet Take 1 tablet (112 mcg total) by mouth before breakfast.    losartan (COZAAR) 100 MG tablet Take 1 tablet (100 mg total) by mouth once daily.    pantoprazole (PROTONIX) 40 MG tablet Take 1 tablet (40 mg total) by mouth once daily.    potassium bicarbonate (K-LYTE) disintegrating tablet Take 1 tablet (25 mEq total) by mouth once daily.    rOPINIRole (REQUIP) 2 MG tablet Take 2 mg by mouth 3 (three) times daily.    spironolactone (ALDACTONE) 25 MG tablet Take 1 tablet (25 mg total) by mouth once daily.    venlafaxine (EFFEXOR-XR) 150 MG Cp24 Take 150 mg by mouth once daily.     Family History    None       Tobacco Use    Smoking status: Never    Smokeless tobacco: Never   Substance and Sexual Activity    Alcohol use: Not Currently    Drug use: Not Currently    Sexual activity: Not Currently     Review of Systems   Constitutional:  Positive for appetite change and fatigue.   Respiratory:  Positive for shortness of breath.    Gastrointestinal:  Negative for nausea and vomiting.   Musculoskeletal:  Positive for arthralgias.   Neurological:  Positive for weakness.   Psychiatric/Behavioral:  Positive for confusion.      Objective:     Vital Signs (Most Recent):  Temp: 97.7 °F (36.5 °C) (02/09/24 0732)  Pulse: (!) 55 (02/09/24 0732)  Resp: 18 (02/09/24 0732)  BP: (!) 151/63 (02/09/24 0732)  SpO2: (!) 93 % (02/09/24 0732) Vital Signs (24h Range):  Temp:  [97.6 °F (36.4 °C)-97.7 °F (36.5 °C)] 97.7 °F (36.5 °C)  Pulse:  [55-70] 55  Resp:  [18-24] 18  SpO2:  [86 %-96 %] 93 %  BP: (144-151)/(63-70) 151/63     Weight: 68 kg (149 lb 14.6 oz)  Body mass  index is 27.42 kg/m².     Physical Exam  Vitals reviewed.   Constitutional:       General: She is not in acute distress.     Appearance: Normal appearance. She is not toxic-appearing.   HENT:      Head: Normocephalic and atraumatic.   Eyes:      General: No scleral icterus.     Extraocular Movements: Extraocular movements intact.      Conjunctiva/sclera: Conjunctivae normal.      Pupils: Pupils are equal, round, and reactive to light.   Cardiovascular:      Rate and Rhythm: Normal rate and regular rhythm.      Heart sounds: No murmur heard.     No friction rub. No gallop.   Pulmonary:      Effort: Pulmonary effort is normal. No respiratory distress.      Breath sounds: Normal breath sounds. No wheezing or rales.   Abdominal:      General: Abdomen is flat. Bowel sounds are normal. There is no distension.      Palpations: Abdomen is soft.      Tenderness: There is no abdominal tenderness. There is no guarding.   Musculoskeletal:         General: No swelling.      Right lower leg: Edema present.      Left lower leg: Edema present.   Skin:     General: Skin is warm and dry.      Coloration: Skin is not jaundiced.      Findings: No rash.   Neurological:      General: No focal deficit present.      Mental Status: She is alert. Mental status is at baseline.      Sensory: No sensory deficit.      Motor: Weakness present.   Psychiatric:         Mood and Affect: Mood normal.         Behavior: Behavior normal.              CRANIAL NERVES     CN III, IV, VI   Pupils are equal, round, and reactive to light.       Significant Labs: All pertinent labs within the past 24 hours have been reviewed.  Recent Lab Results         02/09/24  0515        Anion Gap 13       Baso # 0.05       Basophil % 0.7       BUN 25       BUN/CREAT RATIO 16       Calcium 8.8       Chloride 94       CO2 29       Creatinine 1.55       Differential Method Auto       eGFR 33       Eos # 0.17       Eos % 2.4       Glucose 104       Hematocrit 34.3        Hemoglobin 11.1       Lymph # 0.98       Lymph % 13.9       MCH 31.0       MCHC 32.4       MCV 95.8       Mono # 0.85       Mono % 12.1       MPV 9.4       Neutrophils, Abs 4.99       Neutrophils Relative 70.9       Platelet Count 211       Potassium 2.9       RBC 3.58       RDW 15.2       Sodium 133       WBC 7.04               Significant Imaging: I have reviewed all pertinent imaging results/findings within the past 24 hours.  Assessment/Plan:     Dementia without behavioral disturbance  Monitor behavior.      Hypothyroid  On meds.      Right heart failure  Monitor resp status and fluid status closely.       Pulmonary HTN          VTE Risk Mitigation (From admission, onward)           Ordered     apixaban tablet 5 mg  2 times daily         02/08/24 1447                                     [facility] Hospital Medicine  Progress Note    Patient Name: Courtney Pickens  MRN: [id number]  Patient Class: IP- Inpatient   Admission Date: 2/8/2024  Length of Stay: 1 days  Attending Physician: Austin Valencia DO  Primary Care Provider: Bertha Mccall FNP        Subjective:     Principal Problem:Weakness    Interval History:     Mrs. Pickens is a pleasant 82 y/o  female with a PMHx of ILYA, Pulmonary HTN, Atrial Fibrillation, Thyroid Disease, Depression, Moderate Tricuspid Regurgitation, Moderate Aortic Regurgitation, COPD, and Venous Insufficiency was admitted to [facility] on 1/31/2024 for volume overload despite diuresis, kidney failure, metabolic encephalopathy and C-Difficle Colilitis.  Patient had a temporary dialysis catheter placed and underwent three rounds of hemodialysis before having a successful lasix trial. Patient was evaluated by Cardiology, echocardiogram showed a LVEF of 55-60% and worsening Tricuspid Regurgitation with a RSVP of 93 mmHg. Patient was placed on a 1,000 ml/24 hr fluid restriction. Patient requiring supplemental oxygen via nasal cannula at 2  LMP. Kidney function improved and anasarca resolved, patient was discharged here to [facility] for In[facility].     Review of Systems   Constitutional:  Positive for activity change and fatigue.   HENT: Negative.     Eyes: Negative.    Respiratory:  Positive for shortness of breath.    Cardiovascular:  Positive for leg swelling.   Gastrointestinal: Negative.    Endocrine: Negative.    Genitourinary: Negative.    Musculoskeletal: Negative.    Skin: Negative.    Allergic/Immunologic: Negative.    Neurological: Negative.    Hematological: Negative.    Psychiatric/Behavioral: Negative.       Objective:     Vital Signs (Most Recent):  Temp: 97.7 °F (36.5 °C) (02/08/24 1923)  Pulse: 69 (02/09/24 0702)  Resp: 18 (02/09/24 0702)  BP: (!) 151/70 (02/08/24 1923)  SpO2: (!) 94 % (02/09/24 0702) Vital Signs (24h Range):  Temp:  [97.6 °F (36.4 °C)-97.7 °F (36.5 °C)] 97.7 °F (36.5 °C)  Pulse:  [57-70] 69  Resp:  [18-24] 18  SpO2:  [86 %-96 %] 94 %  BP: (144-151)/(69-70) 151/70     Weight: 68 kg (149 lb 14.6 oz)  Body mass index is 27.42 kg/m².    Intake/Output Summary (Last 24 hours) at 2/9/2024 0702  Last data filed at 2/9/2024 0604  Gross per 24 hour   Intake 520 ml   Output 275 ml   Net 245 ml      Physical Exam  Vitals and nursing note reviewed.   Constitutional:       Appearance: She is ill-appearing.   HENT:      Head: Normocephalic and atraumatic.      Right Ear: Tympanic membrane, ear canal and external ear normal. There is no impacted cerumen.      Left Ear: Tympanic membrane, ear canal and external ear normal. There is no impacted cerumen.      Nose: Nose normal. No congestion or rhinorrhea.      Mouth/Throat:      Mouth: Mucous membranes are moist.      Pharynx: Oropharynx is clear. No oropharyngeal exudate or posterior oropharyngeal erythema.   Eyes:      General: No scleral icterus.        Right eye: No discharge.         Left eye: No discharge.      Extraocular Movements: Extraocular  movements intact.      Conjunctiva/sclera: Conjunctivae normal.      Pupils: Pupils are equal, round, and reactive to light.   Neck:      Vascular: No carotid bruit.   Cardiovascular:      Rate and Rhythm: Rhythm irregular.      Pulses: Normal pulses.      Heart sounds: Murmur heard.   Pulmonary:      Effort: Pulmonary effort is normal.      Comments: Patient on supplemental oxygen at 2 LPM.   Abdominal:      General: Abdomen is flat. There is no distension.      Palpations: There is no mass.      Tenderness: There is no abdominal tenderness. There is no right CVA tenderness, left CVA tenderness, guarding or rebound.      Hernia: No hernia is present.   Musculoskeletal:         General: Swelling present.      Cervical back: Normal range of motion and neck supple. No rigidity or tenderness.      Right lower leg: Edema present.      Left lower leg: Edema present.   Lymphadenopathy:      Cervical: No cervical adenopathy.   Skin:     General: Skin is warm.      Capillary Refill: Capillary refill takes less than 2 seconds.      Coloration: Skin is pale.   Neurological:      Mental Status: She is alert. Mental status is at baseline.   Psychiatric:         Mood and Affect: Mood normal.         Behavior: Behavior normal.         Thought Content: Thought content normal.         Judgment: Judgment normal.           Overview/Hospital Course:   Patient admitted to Inpatient Swing Bed for PT/OT for weakness and debility     Significant Labs: All pertinent labs within the past 24 hours have been reviewed.    Significant Imaging: I have reviewed all pertinent imaging results/findings within the past 24 hours.    Assessment/Plan:      PT/OT evlauation today  2. Obtain baseline lab work and chest work  3. Continue home medications    Active Diagnoses:    Diagnosis Date Noted POA    PRINCIPAL PROBLEM:  Weakness [R53.1] 02/09/2024 Unknown      Problems Resolved During this Admission:     VTE Risk Mitigation (From admission, onward)            Ordered     apixaban tablet 5 mg  2 times daily         02/08/24 1447                       VIOLA Zapien  Department of [facility]   [facility]      Austin Valencia DO  Department of [facility]  [facility]

## 2024-02-09 NOTE — NURSING
"When I introduced myself, client stated I was her nurse yesterday.  Explained that I had never seen or taken care of her before. Client argued stating I was her nurse yesterday.  Client started screaming at RN when calling out her AM medications. Client stated, "I can't take it. Help me Lord." Client told RN to not call out anymore of her medicines by name and starting counting out loud. She refused to take her oral potassium pills even after RN instructed her on side effect of low potassium, explaining that her potassium is 2.9. RN offered to have NP change her potassium to effervescent or granule form to mix with juice and client stated that she would refuse to take it also.  Notified NP, Rebecca Brooks.    12 Noon- Client took the rest of her potassium 40meq after her daughter in law gave it to her with RN monitoring.  "

## 2024-02-09 NOTE — PROGRESS NOTES
[facility]  Hospital Medicine  Progress Note    Patient Name: Courtney Pickens  MRN: [id number]  Patient Class: IP- Inpatient   Admission Date: 2/8/2024  Length of Stay: 1 days  Attending Physician: Austin Valencia DO  Primary Care Provider: Bertha Mccall FNP        Subjective:     Principal Problem:Weakness    Interval History:     Mrs. Pickens is a pleasant 84 y/o  female with a PMHx of ILYA, Pulmonary HTN, Atrial Fibrillation, Thyroid Disease, Depression, Moderate Tricuspid Regurgitation, Moderate Aortic Regurgitation, COPD, and Venous Insufficiency was admitted to [facility] on 1/31/2024 for volume overload despite diuresis, kidney failure, metabolic encephalopathy and C-Difficle Colilitis.  Patient had a temporary dialysis catheter placed and underwent three rounds of hemodialysis before having a successful lasix trial. Patient was evaluated by Cardiology, echocardiogram showed a LVEF of 55-60% and worsening Tricuspid Regurgitation with a RSVP of 93 mmHg. Patient was placed on a 1,000 ml/24 hr fluid restriction. Patient requiring supplemental oxygen via nasal cannula at 2 LMP. Kidney function improved and anasarca resolved, patient was discharged here to [facility].     Review of Systems   Constitutional:  Positive for activity change and fatigue.   HENT: Negative.     Eyes: Negative.    Respiratory:  Positive for shortness of breath.    Cardiovascular:  Positive for leg swelling.   Gastrointestinal: Negative.    Endocrine: Negative.    Genitourinary: Negative.    Musculoskeletal: Negative.    Skin: Negative.    Allergic/Immunologic: Negative.    Neurological: Negative.    Hematological: Negative.    Psychiatric/Behavioral: Negative.       Objective:     Vital Signs (Most Recent):  Temp: 97.7 °F (36.5 °C) (02/08/24 1923)  Pulse: 69 (02/09/24 0702)  Resp: 18 (02/09/24 0702)  BP: (!) 151/70 (02/08/24  1923)  SpO2: (!) 94 % (02/09/24 0702) Vital Signs (24h Range):  Temp:  [97.6 °F (36.4 °C)-97.7 °F (36.5 °C)] 97.7 °F (36.5 °C)  Pulse:  [57-70] 69  Resp:  [18-24] 18  SpO2:  [86 %-96 %] 94 %  BP: (144-151)/(69-70) 151/70     Weight: 68 kg (149 lb 14.6 oz)  Body mass index is 27.42 kg/m².    Intake/Output Summary (Last 24 hours) at 2/9/2024 0702  Last data filed at 2/9/2024 0604  Gross per 24 hour   Intake 520 ml   Output 275 ml   Net 245 ml      Physical Exam  Vitals and nursing note reviewed.   Constitutional:       Appearance: She is ill-appearing.   HENT:      Head: Normocephalic and atraumatic.      Right Ear: Tympanic membrane, ear canal and external ear normal. There is no impacted cerumen.      Left Ear: Tympanic membrane, ear canal and external ear normal. There is no impacted cerumen.      Nose: Nose normal. No congestion or rhinorrhea.      Mouth/Throat:      Mouth: Mucous membranes are moist.      Pharynx: Oropharynx is clear. No oropharyngeal exudate or posterior oropharyngeal erythema.   Eyes:      General: No scleral icterus.        Right eye: No discharge.         Left eye: No discharge.      Extraocular Movements: Extraocular movements intact.      Conjunctiva/sclera: Conjunctivae normal.      Pupils: Pupils are equal, round, and reactive to light.   Neck:      Vascular: No carotid bruit.   Cardiovascular:      Rate and Rhythm: Rhythm irregular.      Pulses: Normal pulses.      Heart sounds: Murmur heard.   Pulmonary:      Effort: Pulmonary effort is normal.      Comments: Patient on supplemental oxygen at 2 LPM.   Abdominal:      General: Abdomen is flat. There is no distension.      Palpations: There is no mass.      Tenderness: There is no abdominal tenderness. There is no right CVA tenderness, left CVA tenderness, guarding or rebound.      Hernia: No hernia is present.   Musculoskeletal:         General: Swelling present.      Cervical back: Normal range of motion and neck supple. No rigidity or  tenderness.      Right lower leg: Edema present.      Left lower leg: Edema present.   Lymphadenopathy:      Cervical: No cervical adenopathy.   Skin:     General: Skin is warm.      Capillary Refill: Capillary refill takes less than 2 seconds.      Coloration: Skin is pale.   Neurological:      Mental Status: She is alert. Mental status is at baseline.   Psychiatric:         Mood and Affect: Mood normal.         Behavior: Behavior normal.         Thought Content: Thought content normal.         Judgment: Judgment normal.           Overview/Hospital Course:   Patient admitted to Inpatient Swing Bed for PT/OT for weakness and debility     Significant Labs: All pertinent labs within the past 24 hours have been reviewed.    Significant Imaging: I have reviewed all pertinent imaging results/findings within the past 24 hours.    Assessment/Plan:      PT/OT evlauation today  2. Obtain baseline lab work and chest work  3. Continue home medications    Active Diagnoses:    Diagnosis Date Noted POA    PRINCIPAL PROBLEM:  Weakness [R53.1] 02/09/2024 Unknown      Problems Resolved During this Admission:     VTE Risk Mitigation (From admission, onward)           Ordered     apixaban tablet 5 mg  2 times daily         02/08/24 1447                       VIOLA Zapien  Department of Hospital Medicine   [facility]

## 2024-02-09 NOTE — SUBJECTIVE & OBJECTIVE
Past Medical History:   Diagnosis Date    Anxiety     Ocasio's esophagus     CHF (congestive heart failure)     Depression     GERD (gastroesophageal reflux disease)     Hypothyroidism     ILYA (obstructive sleep apnea)     Pneumonia     Pulmonary HTN        Past Surgical History:   Procedure Laterality Date    CATARACT EXTRACTION      CHOLECYSTECTOMY      HYSTERECTOMY         Review of patient's allergies indicates:   Allergen Reactions    Codeine     Diflucan [fluconazole] Hives    Lisinopril     Opioids - morphine analogues        No current facility-administered medications on file prior to encounter.     Current Outpatient Medications on File Prior to Encounter   Medication Sig    chlorthalidone (HYGROTEN) 25 MG Tab Take 1 tablet (25 mg total) by mouth once daily.    furosemide (LASIX) 40 MG tablet Take 1 tablet (40 mg total) by mouth once daily.    levothyroxine (SYNTHROID) 112 MCG tablet Take 1 tablet (112 mcg total) by mouth before breakfast.    losartan (COZAAR) 100 MG tablet Take 1 tablet (100 mg total) by mouth once daily.    pantoprazole (PROTONIX) 40 MG tablet Take 1 tablet (40 mg total) by mouth once daily.    potassium bicarbonate (K-LYTE) disintegrating tablet Take 1 tablet (25 mEq total) by mouth once daily.    rOPINIRole (REQUIP) 2 MG tablet Take 2 mg by mouth 3 (three) times daily.    spironolactone (ALDACTONE) 25 MG tablet Take 1 tablet (25 mg total) by mouth once daily.    venlafaxine (EFFEXOR-XR) 150 MG Cp24 Take 150 mg by mouth once daily.     Family History    None       Tobacco Use    Smoking status: Never    Smokeless tobacco: Never   Substance and Sexual Activity    Alcohol use: Not Currently    Drug use: Not Currently    Sexual activity: Not Currently     Review of Systems   Constitutional:  Positive for appetite change and fatigue.   Respiratory:  Positive for shortness of breath.    Gastrointestinal:  Negative for nausea and vomiting.   Musculoskeletal:  Positive for arthralgias.    Neurological:  Positive for weakness.   Psychiatric/Behavioral:  Positive for confusion.      Objective:     Vital Signs (Most Recent):  Temp: 97.7 °F (36.5 °C) (02/09/24 0732)  Pulse: (!) 55 (02/09/24 0732)  Resp: 18 (02/09/24 0732)  BP: (!) 151/63 (02/09/24 0732)  SpO2: (!) 93 % (02/09/24 0732) Vital Signs (24h Range):  Temp:  [97.6 °F (36.4 °C)-97.7 °F (36.5 °C)] 97.7 °F (36.5 °C)  Pulse:  [55-70] 55  Resp:  [18-24] 18  SpO2:  [86 %-96 %] 93 %  BP: (144-151)/(63-70) 151/63     Weight: 68 kg (149 lb 14.6 oz)  Body mass index is 27.42 kg/m².     Physical Exam  Vitals reviewed.   Constitutional:       General: She is not in acute distress.     Appearance: Normal appearance. She is not toxic-appearing.   HENT:      Head: Normocephalic and atraumatic.   Eyes:      General: No scleral icterus.     Extraocular Movements: Extraocular movements intact.      Conjunctiva/sclera: Conjunctivae normal.      Pupils: Pupils are equal, round, and reactive to light.   Cardiovascular:      Rate and Rhythm: Normal rate and regular rhythm.      Heart sounds: No murmur heard.     No friction rub. No gallop.   Pulmonary:      Effort: Pulmonary effort is normal. No respiratory distress.      Breath sounds: Normal breath sounds. No wheezing or rales.   Abdominal:      General: Abdomen is flat. Bowel sounds are normal. There is no distension.      Palpations: Abdomen is soft.      Tenderness: There is no abdominal tenderness. There is no guarding.   Musculoskeletal:         General: No swelling.      Right lower leg: Edema present.      Left lower leg: Edema present.   Skin:     General: Skin is warm and dry.      Coloration: Skin is not jaundiced.      Findings: No rash.   Neurological:      General: No focal deficit present.      Mental Status: She is alert. Mental status is at baseline.      Sensory: No sensory deficit.      Motor: Weakness present.   Psychiatric:         Mood and Affect: Mood normal.         Behavior: Behavior  normal.              CRANIAL NERVES     CN III, IV, VI   Pupils are equal, round, and reactive to light.       Significant Labs: All pertinent labs within the past 24 hours have been reviewed.  Recent Lab Results         02/09/24  0515        Anion Gap 13       Baso # 0.05       Basophil % 0.7       BUN 25       BUN/CREAT RATIO 16       Calcium 8.8       Chloride 94       CO2 29       Creatinine 1.55       Differential Method Auto       eGFR 33       Eos # 0.17       Eos % 2.4       Glucose 104       Hematocrit 34.3       Hemoglobin 11.1       Lymph # 0.98       Lymph % 13.9       MCH 31.0       MCHC 32.4       MCV 95.8       Mono # 0.85       Mono % 12.1       MPV 9.4       Neutrophils, Abs 4.99       Neutrophils Relative 70.9       Platelet Count 211       Potassium 2.9       RBC 3.58       RDW 15.2       Sodium 133       WBC 7.04               Significant Imaging: I have reviewed all pertinent imaging results/findings within the past 24 hours.

## 2024-02-09 NOTE — PT/OT/SLP EVAL
Physical Therapy Evaluation    Patient Name:  Courtney Pickens   MRN:  [id number]    Recommendations:     Discharge Recommendations: Low Intensity Therapy   Discharge Equipment Recommendations: none   Barriers to discharge: Inaccessible home and Decreased caregiver support, high falls risk    Assessment:     Courtney Pickens is a 83 y.o. female admitted with a medical diagnosis of Weakness.  She presents with the following impairments/functional limitations: weakness, impaired endurance, impaired functional mobility, gait instability, impaired balance, impaired cognition, decreased lower extremity function, pain, decreased ROM, edema, impaired cardiopulmonary response to activity .    Pt hosp'd past 3 weeks for kidney failure and metabolic encephalopathy. She is now +C-Diff with contact px's. She is admitted to [facility] for weaknes and debility.     Rehab Prognosis: Fair; patient would benefit from acute skilled PT services to address these deficits and reach maximum level of function.    Recent Surgery: * No surgery found *      Plan:     During this hospitalization, patient to be seen 5 x/week to address the identified rehab impairments via gait training, neuromuscular re-education, therapeutic activities, therapeutic exercises and progress toward the following goals:    Plan of Care Expires:       Subjective     Chief Complaint: Pt initially only c/o left eye pain, but when walking she began c/o severe jeffrey knee pain. Dtr states pt gets confused with too much information.  Patient/Family Comments/goals: Pt is to have a full-time sitter at home until bed is available at the [facility] in [place].  Pain/Comfort:  Pain Rating 1: 6/10  Location - Side 1: Left  Location - Orientation 1: lateral  Location 1: eye  Pain Rating Post-Intervention 1: 6/10  Pain Rating 2: 8/10  Location - Side 2: Bilateral  Location - Orientation 2: lower  Location 2: knee  Pain Addressed 2: Pre-medicate for activity, Cessation of Activity  Pain Rating  "Post-Intervention 2: 0/10    Patients cultural, spiritual, Methodist conflicts given the current situation: no    Living Environment:  Pt lives alone with family assisting her intermittently. Has 8-10" threshold step at the carport which is the pt's main entrance.  Prior to admission, patients level of function was modif indep/svn with household transfers and ambulation with intermittent use of a SC. Pt was becoming increasingly unsafe at home and the pt reports approximately 6 falls in past 3 months d/t LOB.   Equipment used at home: cane, straight, shower chair, rollator, oxygen.  DME owned (not currently used):  rollator .  Upon discharge, patient will have assistance from sitter, family, home health.    Objective:     Communicated with OT prior to session who stated that the pt get agitated easily due to cognitive deficits.  Patient found up in chair with oxygen  upon PT entry to room.    General Precautions: Standard, fall, contact  Orthopedic Precautions:    Braces:    Respiratory Status: Nasal cannula, flow 2 L/min    Exams:  RLE ROM: Deficits: tight calf and quads  RLE Strength: Deficits: grossly 4-/5 except hip ext 3-/5  LLE ROM: Deficits: tight calf and quads  LLE Strength: grossly 4-/5 except hip ext 3-/5    Functional Mobility:  Transfers:     Sit to Stand:  moderate assistance with rolling walker  Gait: Pt amb'd 5 ft with min Ax1 using RW/wc trail/O2. WBOS, lacks step through jeffrey, slow jason.  Tinetti Total Score:  8/28 using RW  < 18 = High Risk of Falls, 19-23 = Moderate Risk of Falls, > 24 = Low Risk of Falls      AM-PAC 6 CLICK MOBILITY  Total Score:        Treatment & Education:  Eval completed. Pt's DIL present throughout eval and stated that the pt cannot handle large amnts of information because she will get agitated. PT instructed pt in need to keep O2 cannula in her nose due to oxygen dropping. Pt's SPO2 was only 82% when PT found pt and was observed to have cannula sitting on top of her " glasses. Several times pt would remove it and required VC to replace it. At 2 L O2, sats only increased to 86-87%. PT increased O2 to 3L and it came up to 90%. Pt had to sit down when walking d/t jeffrey knee pain increased to 8/10 in wt bearing.    Patient left up in chair with all lines intact, call button in reach, and DIL present.    GOALS:   Multidisciplinary Problems       Physical Therapy Goals          Problem: Physical Therapy    Goal Priority Disciplines Outcome Goal Variances Interventions   Physical Therapy Goal     PT, PT/OT Ongoing, Progressing     Description: LTG - 3 weeks  1. Pt svn with bed mob.  2. Pt SBA with transfers.  3. Pt will amb 100 ft with SBA using RW.  4. Pt will have 4/5 gross BLE strength.  5. Pt's Tinetti test score will be at least 12/28 points.                       History:     Past Medical History:   Diagnosis Date    Anxiety     Ocasio's esophagus     CHF (congestive heart failure)     Depression     GERD (gastroesophageal reflux disease)     Hypothyroidism     ILYA (obstructive sleep apnea)     Pneumonia     Pulmonary HTN        Past Surgical History:   Procedure Laterality Date    CATARACT EXTRACTION      CHOLECYSTECTOMY      HYSTERECTOMY         Time Tracking:     PT Received On: 02/09/24  PT Start Time: 1105     PT Stop Time: 1120  PT Total Time (min): 15 min     Billable Minutes: Evaluation 15      02/09/2024

## 2024-02-09 NOTE — PT/OT/SLP EVAL
Speech Language Pathology Evaluation  Cognitive/Bedside Swallow    Patient Name:  Courtney Pickens   MRN:  [id number]  Admitting Diagnosis: Weakness    Recommendations:                  General Recommendations:  Cognitive-linguistic therapy  Diet recommendations:  Regular, Thin   Aspiration Precautions: Avoid talking while eating, Small bites/sips, and Standard aspiration precautions   General Precautions: Standard, fall  Communication strategies:  provide increased time to answer    Assessment:     Courtney Pickens is a 83 y.o. female with an SLP diagnosis of Cognitive-Linguistic Impairment.    She presents with [facility] admission post acute renal failure with hospitalization and onset of Cdiff. Pt with overall decline in independence and strength.    Pt presents with deficits in recall , reasoning, safety awareness affecting ability to return home independently as pt lived prior.     History:     Past Medical History:   Diagnosis Date    Anxiety     Ocasio's esophagus     CHF (congestive heart failure)     Depression     GERD (gastroesophageal reflux disease)     Hypothyroidism     ILYA (obstructive sleep apnea)     Pneumonia     Pulmonary HTN        Past Surgical History:   Procedure Laterality Date    CATARACT EXTRACTION      CHOLECYSTECTOMY      HYSTERECTOMY         Social History: Patient lives at home with assistance from family at this time for med/finances/meals at times.    Prior Intubation HX:  na    Modified Barium Swallow: na    Chest X-Rays: see chart for xray    Prior diet: regular, thin .    Occupation/hobbies/homemaking: none stated .    Subjective     Pt alert, upright in wheelchair and agree to ST eval   Patient goals: get back home      Pain/Comfort:       Respiratory Status: Nasal cannula, flow 2 L/min    Objective:     Cognitive Status:    Orientation Person, Place, Situation, and Time  Memory Immediate Recall moderate to severe and long term recall mild to moderate   Problem Solving moderate  Safety  "awareness moderate, lacking insight into deficits       Receptive Language:   Comprehension:      Commands  One step WFL  two step basic commands WFL  multistep basic commands mild/mod    Pragmatics:    inconsistent eye contact noted     Expressive Language:  Verbal:    Confontational naming WFL, automatic speech WFL, sentence completion WFL      Motor Speech:  WFL    Voice:   WFL      Oral Musculature Evaluation  Oral Musculature: WNL  Dentition: present and adequate  Secretion Management: adequate  Mucosal Quality: good  Mandibular Strength and Mobility: WNL  Oral Labial Strength and Mobility: WNL  Lingual Strength and Mobility: WNL  Velar Elevation: WNL  Volitional Cough: WNL  Volitional Swallow: WNL  Voice Prior to PO Intake: clear    Bedside Swallow Eval:   Consistencies Assessed:  Thin liquids x5  Soft solids x1    Med intake x1    Oral Phase:   WFL  Prolonged mastication    Pharyngeal Phase:   no overt clinical signs/symptoms of aspiration      Treatment: regarding cognitive status, pt indicated, "I choke up sometimes when I east too fast or too much together." ST noted no overt s/sx aspiration, edu provided regarding safe swallow strategies.    Goals:   Multidisciplinary Problems       SLP Goals          Problem: SLP    Goal Priority Disciplines Outcome   SLP Goal     SLP Ongoing, Progressing   Description: Pt will demonstrate orientation to person, place, time with 80% accuracy.  Pt will recall 2-3 details with 80% accuracy.   Pt will demonstrate ID safe/unsafe situations with 70% accuracy.   Pt will maintain attention to task 4-5 minutes with 80% accuracy.   Pt will demonstrate problem solving and reasoning skills with 80% accuracy.                        Plan:     Patient to be seen:  5 x/week   Plan of Care expires:  03/01/24  Plan of Care reviewed with:  patient   SLP Follow-Up:  Yes       Discharge recommendations:  Therapy Intensity Recommendations at Discharge: Low Intensity Therapy   Barriers to " Discharge:  Level of Skilled Assistance Needed   and Safety Awareness      Time Tracking:     SLP Treatment Date:      Speech Start Time:  0946  Speech Stop Time:  1006     Speech Total Time (min):  20 min    Billable Minutes: Eval Speech/Language 12  and Eval Swallow and Oral Function 8    02/09/2024

## 2024-02-09 NOTE — PLAN OF CARE
Problem: SLP  Goal: SLP Goal  Description: Pt will demonstrate orientation to person, place, time with 80% accuracy.  Pt will recall 2-3 details with 80% accuracy.   Pt will demonstrate ID safe/unsafe situations with 70% accuracy.   Pt will maintain attention to task 4-5 minutes with 80% accuracy.   Pt will demonstrate problem solving and reasoning skills with 80% accuracy.   Outcome: Ongoing, Progressing

## 2024-02-09 NOTE — PLAN OF CARE
I spoke with Nuvia, patient's daughter in law regarding dc plans. She states that patient will have a full time sitter at home until she gets a bed at [facility].

## 2024-02-09 NOTE — HOSPITAL COURSE
ADAN Baptiste RN reports is unable to swallow Kcl tablets, changed to efferK    2/12 NO major issues.  Says she wants to go home.  Fluid status seems stable.     Renal function worse despite meds and fluid restriction.  Family wants to DC and take to another facility.

## 2024-02-10 PROCEDURE — 94640 AIRWAY INHALATION TREATMENT: CPT

## 2024-02-10 PROCEDURE — 25000003 PHARM REV CODE 250: Performed by: NURSE PRACTITIONER

## 2024-02-10 PROCEDURE — 25000003 PHARM REV CODE 250: Performed by: HOSPITALIST

## 2024-02-10 PROCEDURE — 27000958

## 2024-02-10 PROCEDURE — 99900035 HC TECH TIME PER 15 MIN (STAT)

## 2024-02-10 PROCEDURE — 11000004 HC SNF PRIVATE

## 2024-02-10 PROCEDURE — 94761 N-INVAS EAR/PLS OXIMETRY MLT: CPT

## 2024-02-10 PROCEDURE — 27000221 HC OXYGEN, UP TO 24 HOURS

## 2024-02-10 PROCEDURE — 25000242 PHARM REV CODE 250 ALT 637 W/ HCPCS

## 2024-02-10 PROCEDURE — 25000003 PHARM REV CODE 250

## 2024-02-10 RX ADMIN — TRAZODONE HYDROCHLORIDE 100 MG: 100 TABLET ORAL at 08:02

## 2024-02-10 RX ADMIN — VANCOMYCIN HYDROCHLORIDE 125 MG: 125 CAPSULE ORAL at 11:02

## 2024-02-10 RX ADMIN — GENTAMICIN SULFATE 1 DROP: 3 SOLUTION/ DROPS OPHTHALMIC at 09:02

## 2024-02-10 RX ADMIN — IPRATROPIUM BROMIDE AND ALBUTEROL SULFATE 3 ML: 2.5; .5 SOLUTION RESPIRATORY (INHALATION) at 01:02

## 2024-02-10 RX ADMIN — ONDANSETRON 4 MG: 4 TABLET, ORALLY DISINTEGRATING ORAL at 06:02

## 2024-02-10 RX ADMIN — POTASSIUM CHLORIDE 20 MEQ: 750 CAPSULE, EXTENDED RELEASE ORAL at 08:02

## 2024-02-10 RX ADMIN — IPRATROPIUM BROMIDE AND ALBUTEROL SULFATE 3 ML: 2.5; .5 SOLUTION RESPIRATORY (INHALATION) at 07:02

## 2024-02-10 RX ADMIN — DILTIAZEM HYDROCHLORIDE 120 MG: 120 CAPSULE, COATED, EXTENDED RELEASE ORAL at 08:02

## 2024-02-10 RX ADMIN — DULOXETINE HYDROCHLORIDE 60 MG: 60 CAPSULE, DELAYED RELEASE ORAL at 08:02

## 2024-02-10 RX ADMIN — FERROUS SULFATE TAB 325 MG (65 MG ELEMENTAL FE) 1 EACH: 325 (65 FE) TAB at 08:02

## 2024-02-10 RX ADMIN — FUROSEMIDE 60 MG: 40 TABLET ORAL at 05:02

## 2024-02-10 RX ADMIN — THYROID, PORCINE 120 MG: 60 TABLET ORAL at 06:02

## 2024-02-10 RX ADMIN — VANCOMYCIN HYDROCHLORIDE 125 MG: 125 CAPSULE ORAL at 05:02

## 2024-02-10 RX ADMIN — BUDESONIDE INHALATION 0.5 MG: 0.5 SUSPENSION RESPIRATORY (INHALATION) at 08:02

## 2024-02-10 RX ADMIN — APIXABAN 2.5 MG: 2.5 TABLET, FILM COATED ORAL at 08:02

## 2024-02-10 RX ADMIN — ROPINIROLE HYDROCHLORIDE 4 MG: 1 TABLET, FILM COATED ORAL at 08:02

## 2024-02-10 RX ADMIN — MELATONIN 6 MG: at 08:02

## 2024-02-10 RX ADMIN — VANCOMYCIN HYDROCHLORIDE 125 MG: 125 CAPSULE ORAL at 12:02

## 2024-02-10 RX ADMIN — VANCOMYCIN HYDROCHLORIDE 125 MG: 125 CAPSULE ORAL at 06:02

## 2024-02-10 RX ADMIN — GENTAMICIN SULFATE 1 DROP: 3 SOLUTION/ DROPS OPHTHALMIC at 06:02

## 2024-02-10 RX ADMIN — GENTAMICIN SULFATE 1 DROP: 3 SOLUTION/ DROPS OPHTHALMIC at 05:02

## 2024-02-10 RX ADMIN — BUDESONIDE INHALATION 0.5 MG: 0.5 SUSPENSION RESPIRATORY (INHALATION) at 07:02

## 2024-02-10 RX ADMIN — FUROSEMIDE 60 MG: 40 TABLET ORAL at 08:02

## 2024-02-10 RX ADMIN — ROPINIROLE HYDROCHLORIDE 2 MG: 1 TABLET, FILM COATED ORAL at 08:02

## 2024-02-10 RX ADMIN — CALCIUM CARBONATE 500 MG: 500 TABLET, CHEWABLE ORAL at 06:02

## 2024-02-10 RX ADMIN — SERTRALINE HYDROCHLORIDE 25 MG: 25 TABLET ORAL at 08:02

## 2024-02-10 RX ADMIN — ESTRADIOL 1 MG: 1 TABLET ORAL at 08:02

## 2024-02-10 RX ADMIN — PANTOPRAZOLE SODIUM 40 MG: 40 TABLET, DELAYED RELEASE ORAL at 08:02

## 2024-02-10 RX ADMIN — GENTAMICIN SULFATE 1 DROP: 3 SOLUTION/ DROPS OPHTHALMIC at 02:02

## 2024-02-10 NOTE — NURSING
Client was found by RN laying sideways at the end of the bedroom after hearing bed alarm.  Client stated she was trying to go to the bathroom (BSC).  RN instructed client that she must call for help when ambulating to prevent falls.  Client stated that she called out but noone came and became upset when staff told her that she had not called out.  RN checked client's call bell which is working with no issues identified.  Client was assisted to BSCC and then back to bed with no c/o pain and no injuries.

## 2024-02-10 NOTE — PLAN OF CARE
Problem: Adult Inpatient Plan of Care  Goal: Optimal Comfort and Wellbeing  Outcome: Ongoing, Progressing     Problem: Skin Injury Risk Increased  Goal: Skin Health and Integrity  Outcome: Ongoing, Progressing     Problem: Infection  Goal: Absence of Infection Signs and Symptoms  Outcome: Ongoing, Progressing     Problem: Mood Impairment (Dementia Signs/Symptoms)  Goal: Improved Mood Symptoms (Dementia Signs/Symptoms)  Outcome: Ongoing, Progressing     Problem: Fall Injury Risk  Goal: Absence of Fall and Fall-Related Injury  Outcome: Ongoing, Progressing

## 2024-02-11 PROCEDURE — 27000958

## 2024-02-11 PROCEDURE — 25000003 PHARM REV CODE 250

## 2024-02-11 PROCEDURE — 94761 N-INVAS EAR/PLS OXIMETRY MLT: CPT

## 2024-02-11 PROCEDURE — 11000004 HC SNF PRIVATE

## 2024-02-11 PROCEDURE — 25000003 PHARM REV CODE 250: Performed by: HOSPITALIST

## 2024-02-11 PROCEDURE — 27000221 HC OXYGEN, UP TO 24 HOURS

## 2024-02-11 PROCEDURE — 25000242 PHARM REV CODE 250 ALT 637 W/ HCPCS

## 2024-02-11 PROCEDURE — 94640 AIRWAY INHALATION TREATMENT: CPT

## 2024-02-11 PROCEDURE — 25000003 PHARM REV CODE 250: Performed by: NURSE PRACTITIONER

## 2024-02-11 PROCEDURE — 99900035 HC TECH TIME PER 15 MIN (STAT)

## 2024-02-11 RX ADMIN — MELATONIN 6 MG: at 08:02

## 2024-02-11 RX ADMIN — DULOXETINE HYDROCHLORIDE 60 MG: 60 CAPSULE, DELAYED RELEASE ORAL at 08:02

## 2024-02-11 RX ADMIN — FUROSEMIDE 60 MG: 40 TABLET ORAL at 05:02

## 2024-02-11 RX ADMIN — VANCOMYCIN HYDROCHLORIDE 125 MG: 125 CAPSULE ORAL at 11:02

## 2024-02-11 RX ADMIN — DILTIAZEM HYDROCHLORIDE 120 MG: 120 CAPSULE, COATED, EXTENDED RELEASE ORAL at 08:02

## 2024-02-11 RX ADMIN — BUDESONIDE INHALATION 0.5 MG: 0.5 SUSPENSION RESPIRATORY (INHALATION) at 07:02

## 2024-02-11 RX ADMIN — ROPINIROLE HYDROCHLORIDE 4 MG: 1 TABLET, FILM COATED ORAL at 08:02

## 2024-02-11 RX ADMIN — GENTAMICIN SULFATE 1 DROP: 3 SOLUTION/ DROPS OPHTHALMIC at 05:02

## 2024-02-11 RX ADMIN — ROPINIROLE HYDROCHLORIDE 2 MG: 1 TABLET, FILM COATED ORAL at 08:02

## 2024-02-11 RX ADMIN — VANCOMYCIN HYDROCHLORIDE 125 MG: 125 CAPSULE ORAL at 12:02

## 2024-02-11 RX ADMIN — FUROSEMIDE 60 MG: 40 TABLET ORAL at 08:02

## 2024-02-11 RX ADMIN — GENTAMICIN SULFATE 1 DROP: 3 SOLUTION/ DROPS OPHTHALMIC at 02:02

## 2024-02-11 RX ADMIN — VANCOMYCIN HYDROCHLORIDE 125 MG: 125 CAPSULE ORAL at 05:02

## 2024-02-11 RX ADMIN — IPRATROPIUM BROMIDE AND ALBUTEROL SULFATE 3 ML: 2.5; .5 SOLUTION RESPIRATORY (INHALATION) at 02:02

## 2024-02-11 RX ADMIN — THYROID, PORCINE 120 MG: 60 TABLET ORAL at 05:02

## 2024-02-11 RX ADMIN — GENTAMICIN SULFATE 1 DROP: 3 SOLUTION/ DROPS OPHTHALMIC at 09:02

## 2024-02-11 RX ADMIN — ESTRADIOL 1 MG: 1 TABLET ORAL at 08:02

## 2024-02-11 RX ADMIN — ACETAMINOPHEN 650 MG: 325 TABLET ORAL at 08:02

## 2024-02-11 RX ADMIN — APIXABAN 2.5 MG: 2.5 TABLET, FILM COATED ORAL at 08:02

## 2024-02-11 RX ADMIN — SERTRALINE HYDROCHLORIDE 25 MG: 25 TABLET ORAL at 08:02

## 2024-02-11 RX ADMIN — POTASSIUM CHLORIDE 20 MEQ: 750 CAPSULE, EXTENDED RELEASE ORAL at 08:02

## 2024-02-11 RX ADMIN — TRAZODONE HYDROCHLORIDE 100 MG: 100 TABLET ORAL at 08:02

## 2024-02-11 RX ADMIN — IPRATROPIUM BROMIDE AND ALBUTEROL SULFATE 3 ML: 2.5; .5 SOLUTION RESPIRATORY (INHALATION) at 07:02

## 2024-02-11 RX ADMIN — PANTOPRAZOLE SODIUM 40 MG: 40 TABLET, DELAYED RELEASE ORAL at 08:02

## 2024-02-11 RX ADMIN — GENTAMICIN SULFATE 1 DROP: 3 SOLUTION/ DROPS OPHTHALMIC at 10:02

## 2024-02-11 RX ADMIN — FERROUS SULFATE TAB 325 MG (65 MG ELEMENTAL FE) 1 EACH: 325 (65 FE) TAB at 08:02

## 2024-02-11 NOTE — PLAN OF CARE
Problem: Adult Inpatient Plan of Care  Goal: Absence of Hospital-Acquired Illness or Injury  Outcome: Ongoing, Progressing     Problem: Respiratory Compromise (Pneumonia)  Goal: Effective Oxygenation and Ventilation  Outcome: Ongoing, Progressing     Problem: Breathing Pattern Ineffective  Goal: Effective Breathing Pattern  Outcome: Ongoing, Progressing

## 2024-02-11 NOTE — PLAN OF CARE
Problem: Behavior Regulation Impairment (Dementia Signs/Symptoms)  Goal: Improved Behavioral Control (Dementia Signs/Symptoms)  Outcome: Ongoing, Progressing  Intervention: Manage Behavior  Flowsheets (Taken 2/10/2024 1812)  Environmental Support:   calm environment promoted   caregiver consistency promoted   environmental consistency promoted   personal routine supported     Problem: Cognitive Impairment (Dementia Signs/Symptoms)  Goal: Optimized Cognitive Function (Dementia Signs/Symptoms)  Outcome: Ongoing, Progressing     Problem: Mood Impairment (Dementia Signs/Symptoms)  Goal: Improved Mood Symptoms (Dementia Signs/Symptoms)  Outcome: Ongoing, Progressing  Intervention: Optimize Emotion and Mood  Flowsheets (Taken 2/10/2024 1812)  Supportive Measures:   active listening utilized   self-care encouraged   positive reinforcement provided  Diversional Activity: television     Problem: Fall Injury Risk  Goal: Absence of Fall and Fall-Related Injury  Outcome: Ongoing, Progressing  Intervention: Identify and Manage Contributors  Flowsheets (Taken 2/10/2024 1812)  Self-Care Promotion: independence encouraged  Medication Review/Management: medications reviewed

## 2024-02-12 PROCEDURE — 92507 TX SP LANG VOICE COMM INDIV: CPT

## 2024-02-12 PROCEDURE — 11000004 HC SNF PRIVATE

## 2024-02-12 PROCEDURE — 97110 THERAPEUTIC EXERCISES: CPT

## 2024-02-12 PROCEDURE — 27000958

## 2024-02-12 PROCEDURE — 25000003 PHARM REV CODE 250: Performed by: NURSE PRACTITIONER

## 2024-02-12 PROCEDURE — 94761 N-INVAS EAR/PLS OXIMETRY MLT: CPT

## 2024-02-12 PROCEDURE — 27000221 HC OXYGEN, UP TO 24 HOURS

## 2024-02-12 PROCEDURE — 25000003 PHARM REV CODE 250: Performed by: HOSPITALIST

## 2024-02-12 PROCEDURE — 97535 SELF CARE MNGMENT TRAINING: CPT

## 2024-02-12 PROCEDURE — 92526 ORAL FUNCTION THERAPY: CPT

## 2024-02-12 PROCEDURE — 25000003 PHARM REV CODE 250

## 2024-02-12 PROCEDURE — 97116 GAIT TRAINING THERAPY: CPT

## 2024-02-12 PROCEDURE — 99308 SBSQ NF CARE LOW MDM 20: CPT | Mod: ,,, | Performed by: HOSPITALIST

## 2024-02-12 PROCEDURE — 99900035 HC TECH TIME PER 15 MIN (STAT)

## 2024-02-12 PROCEDURE — 94640 AIRWAY INHALATION TREATMENT: CPT

## 2024-02-12 PROCEDURE — 25000242 PHARM REV CODE 250 ALT 637 W/ HCPCS

## 2024-02-12 RX ORDER — MICONAZOLE NITRATE 2 %
POWDER (GRAM) TOPICAL 2 TIMES DAILY
Status: DISCONTINUED | OUTPATIENT
Start: 2024-02-12 | End: 2024-02-14 | Stop reason: HOSPADM

## 2024-02-12 RX ADMIN — APIXABAN 2.5 MG: 2.5 TABLET, FILM COATED ORAL at 08:02

## 2024-02-12 RX ADMIN — POTASSIUM CHLORIDE 20 MEQ: 750 CAPSULE, EXTENDED RELEASE ORAL at 08:02

## 2024-02-12 RX ADMIN — MICONAZOLE NITRATE ANTIFUNGAL POWDER: 2 POWDER TOPICAL at 08:02

## 2024-02-12 RX ADMIN — GENTAMICIN SULFATE 1 DROP: 3 SOLUTION/ DROPS OPHTHALMIC at 05:02

## 2024-02-12 RX ADMIN — IPRATROPIUM BROMIDE AND ALBUTEROL SULFATE 3 ML: 2.5; .5 SOLUTION RESPIRATORY (INHALATION) at 07:02

## 2024-02-12 RX ADMIN — ACETAMINOPHEN 650 MG: 325 TABLET ORAL at 08:02

## 2024-02-12 RX ADMIN — VANCOMYCIN HYDROCHLORIDE 125 MG: 125 CAPSULE ORAL at 05:02

## 2024-02-12 RX ADMIN — MELATONIN 6 MG: at 08:02

## 2024-02-12 RX ADMIN — IPRATROPIUM BROMIDE AND ALBUTEROL SULFATE 3 ML: 2.5; .5 SOLUTION RESPIRATORY (INHALATION) at 08:02

## 2024-02-12 RX ADMIN — FERROUS SULFATE TAB 325 MG (65 MG ELEMENTAL FE) 1 EACH: 325 (65 FE) TAB at 08:02

## 2024-02-12 RX ADMIN — BUDESONIDE INHALATION 0.5 MG: 0.5 SUSPENSION RESPIRATORY (INHALATION) at 07:02

## 2024-02-12 RX ADMIN — BUDESONIDE INHALATION 0.5 MG: 0.5 SUSPENSION RESPIRATORY (INHALATION) at 08:02

## 2024-02-12 RX ADMIN — IPRATROPIUM BROMIDE AND ALBUTEROL SULFATE 3 ML: 2.5; .5 SOLUTION RESPIRATORY (INHALATION) at 02:02

## 2024-02-12 RX ADMIN — TRAZODONE HYDROCHLORIDE 100 MG: 100 TABLET ORAL at 08:02

## 2024-02-12 RX ADMIN — GENTAMICIN SULFATE 1 DROP: 3 SOLUTION/ DROPS OPHTHALMIC at 10:02

## 2024-02-12 RX ADMIN — ESTRADIOL 1 MG: 1 TABLET ORAL at 08:02

## 2024-02-12 RX ADMIN — VANCOMYCIN HYDROCHLORIDE 125 MG: 125 CAPSULE ORAL at 11:02

## 2024-02-12 RX ADMIN — FUROSEMIDE 60 MG: 40 TABLET ORAL at 05:02

## 2024-02-12 RX ADMIN — FUROSEMIDE 60 MG: 40 TABLET ORAL at 08:02

## 2024-02-12 RX ADMIN — GENTAMICIN SULFATE 1 DROP: 3 SOLUTION/ DROPS OPHTHALMIC at 02:02

## 2024-02-12 RX ADMIN — SERTRALINE HYDROCHLORIDE 25 MG: 25 TABLET ORAL at 08:02

## 2024-02-12 RX ADMIN — DULOXETINE HYDROCHLORIDE 60 MG: 60 CAPSULE, DELAYED RELEASE ORAL at 08:02

## 2024-02-12 RX ADMIN — ROPINIROLE HYDROCHLORIDE 4 MG: 1 TABLET, FILM COATED ORAL at 08:02

## 2024-02-12 RX ADMIN — ONDANSETRON 4 MG: 4 TABLET, ORALLY DISINTEGRATING ORAL at 10:02

## 2024-02-12 RX ADMIN — PANTOPRAZOLE SODIUM 40 MG: 40 TABLET, DELAYED RELEASE ORAL at 08:02

## 2024-02-12 RX ADMIN — THYROID, PORCINE 120 MG: 60 TABLET ORAL at 05:02

## 2024-02-12 RX ADMIN — DILTIAZEM HYDROCHLORIDE 120 MG: 120 CAPSULE, COATED, EXTENDED RELEASE ORAL at 08:02

## 2024-02-12 RX ADMIN — ROPINIROLE HYDROCHLORIDE 2 MG: 1 TABLET, FILM COATED ORAL at 08:02

## 2024-02-12 NOTE — PLAN OF CARE
Problem: Respiratory Compromise (Pneumonia)  Goal: Effective Oxygenation and Ventilation  Outcome: Ongoing, Progressing     Problem: Breathing Pattern Ineffective  Goal: Effective Breathing Pattern  Outcome: Ongoing, Progressing     Problem: Gas Exchange Impaired  Goal: Optimal Gas Exchange  Outcome: Ongoing, Progressing

## 2024-02-12 NOTE — SUBJECTIVE & OBJECTIVE
Interval History: about the same, no major issues, she wants to go home.    Review of Systems   Constitutional:  Positive for appetite change and fatigue.   Respiratory:  Negative for shortness of breath.    Cardiovascular:  Negative for chest pain.   Gastrointestinal:  Negative for abdominal pain, nausea and vomiting.   Musculoskeletal:  Positive for arthralgias.   Neurological:  Positive for weakness.   Psychiatric/Behavioral:  Positive for confusion. Negative for agitation.    All other systems reviewed and are negative.    Objective:     Vital Signs (Most Recent):  Temp: 97.5 °F (36.4 °C) (02/12/24 0719)  Pulse: 61 (02/12/24 0747)  Resp: 18 (02/12/24 0747)  BP: (!) 120/54 (02/12/24 0719)  SpO2: 100 % (02/12/24 0747) Vital Signs (24h Range):  Temp:  [97.5 °F (36.4 °C)-97.6 °F (36.4 °C)] 97.5 °F (36.4 °C)  Pulse:  [56-65] 61  Resp:  [18-23] 18  SpO2:  [93 %-100 %] 100 %  BP: (120-123)/(54-57) 120/54     Weight: 70.9 kg (156 lb 4.9 oz)  Body mass index is 28.59 kg/m².    Intake/Output Summary (Last 24 hours) at 2/12/2024 1203  Last data filed at 2/12/2024 0935  Gross per 24 hour   Intake 778 ml   Output 500 ml   Net 278 ml         Physical Exam  Vitals reviewed.   Constitutional:       General: She is not in acute distress.     Appearance: Normal appearance. She is not toxic-appearing.   HENT:      Head: Normocephalic and atraumatic.   Eyes:      General: No scleral icterus.     Extraocular Movements: Extraocular movements intact.      Conjunctiva/sclera: Conjunctivae normal.      Pupils: Pupils are equal, round, and reactive to light.   Cardiovascular:      Rate and Rhythm: Normal rate and regular rhythm.      Heart sounds: No murmur heard.     No friction rub. No gallop.   Pulmonary:      Effort: Pulmonary effort is normal. No respiratory distress.      Breath sounds: Normal breath sounds. No wheezing or rales.   Abdominal:      General: Abdomen is flat. Bowel sounds are normal. There is no distension.       Palpations: Abdomen is soft.      Tenderness: There is no abdominal tenderness. There is no guarding.   Musculoskeletal:         General: No swelling.      Right lower leg: Edema present.      Left lower leg: Edema present.   Skin:     General: Skin is warm and dry.      Coloration: Skin is not jaundiced.      Findings: No rash.   Neurological:      General: No focal deficit present.      Mental Status: She is alert. Mental status is at baseline.      Sensory: No sensory deficit.      Motor: Weakness present.   Psychiatric:         Mood and Affect: Mood normal.         Behavior: Behavior normal.             Significant Labs: All pertinent labs within the past 24 hours have been reviewed.  Recent Lab Results       None            Significant Imaging: I have reviewed all pertinent imaging results/findings within the past 24 hours.

## 2024-02-12 NOTE — PLAN OF CARE
Problem: Occupational Therapy  Goal: Occupational Therapy Goal  Description: STG: within 2 weeks  Pt will perform grooming with setup and independence ONGOING/PROGRESSING  Pt will bathe with SVN ONGOING/PROGRESSING  Pt will perform UE dressing with SVN ONGOING/PROGRESSING  Pt will perform LE dressing with SVN ONGOING/PROGRESSING  Pt will sit EOB x 30 min with SVN assistance ONGOING/PROGRESSING  Pt will transfer bed/chair/bsc with SVN ONGOING/PROGRESSING  Pt will perform standing task x 5 min with SVN assistance ONGOING/PROGRESSING  Pt will tolerate 35 minutes of tx without fatigue ONGOING/PROGRESSING      LT.Restore to max I with self care and mobility.    Outcome: Ongoing, Progressing

## 2024-02-12 NOTE — PT/OT/SLP PROGRESS
"Speech Language Pathology Treatment    Patient Name:  Courtney Pickens   MRN:  [id number]  Admitting Diagnosis: Weakness    Recommendations:                 General Recommendations:  Dysphagia therapy and Cognitive-linguistic therapy  Diet recommendations:  Regular, Liquid Diet Level: Thin   Aspiration Precautions: 1 bite/sip at a time, Avoid talking while eating, and Eliminate distractions   General Precautions: Standard, fall  Communication strategies:  provide increased time to answer    Assessment:     Courtney Pickens is a 83 y.o. female with an SLP diagnosis of Cognitive-Linguistic Impairment.    She presents with SWB admission post acute renal failure with hospitalization and onset of Cdiff. Pt with overall decline in independence and strength.     Pt presents with deficits in recall , reasoning, safety awareness affecting ability to return home independently as pt lived prior.     ST noted pt with Modified Barium Swallow completed at prior hospital with recommendation regular diet, nectar liquids secondary to microaspiration thin liquids at times  per ST on 2/2. Physician dc orders indicated to resume regular diet. ST conversed with daughter, daughter confirmed swallow assessment and indicated difficulty at first during hospital, however improved over time. ST to modify POC to include airway protection goals.   Subjective     Pt alert with family present, ST return at later time due to visit.   Patient goals: "get me home"      Pain/Comfort:       Respiratory Status: Nasal cannula, flow 2 L/min    Objective:     Has the patient been evaluated by SLP for swallowing?   Yes  Keep patient NPO? No   Current Respiratory Status:        Pt with intake thin liquids x2 with no overt s/sx aspiration. Refusal of snack.   Task for airway protection completed across 1 sets of 10, 1 set of 8 with pt requiring ST model for continuation with accuracy.     Pt  oriented to person, place, time utilizing schedule/clock. Pt " orientated to location/reasoning for stay via ST.  Pt with recall task for 3 details with 55% accuracy, 80% with repetition and gesture. Pt able to recall 1 delayed detail from information given mod verbal instructions.   Pt with completion of attention and topic maintenance task with 65% accuracy given redirection at times.   ST reoriented pt prior to end of session regarding current season, events, RAVINDRA, month, etc with pt completing with 78% accuracy.     Goals:   Multidisciplinary Problems       SLP Goals          Problem: SLP    Goal Priority Disciplines Outcome   SLP Goal     SLP Ongoing, Progressing   Description: Pt will demonstrate orientation to person, place, time with 80% accuracy.  Pt will recall 2-3 details with 80% accuracy.   Pt will demonstrate ID safe/unsafe situations with 70% accuracy.   Pt will maintain attention to task 4-5 minutes with 80% accuracy.   Pt will demonstrate problem solving and reasoning skills with 80% accuracy.   Patient will complete vocal fold adduction/airway protection tasks with 90% accuracy                       Plan:     Patient to be seen:  5 x/week   Plan of Care expires:  03/01/24  Plan of Care reviewed with:  patient   SLP Follow-Up:  Yes       Discharge recommendations:  Low Intensity Therapy   Barriers to Discharge:  Level of Skilled Assistance Needed      Time Tracking:     SLP Treatment Date:   02/12/24  Speech Start Time:  1345  Speech Stop Time:  1416     Speech Total Time (min):  31 min    Billable Minutes: Speech Therapy Individual 15 and Treatment Swallowing Dysfunction 16    02/12/2024

## 2024-02-12 NOTE — CONSULTS
[facility]  Adult Nutrition  Consult Note         Reason for Assessment  Reason For Assessment: consult SWB patient  Nutrition Risk Screen: no indicators present    Assessment and Plan  Consult received and appreciated. Patient admitted with dx of weakness, dementia, R heart failure, and pulmonary HTN. Patient is ordered a 2 gm Na+ diet. Po intake 50-75% per flowsheets. No chewing/swallowing issues noted per nursing screen. Noted SLP evaluation.    Current weight is 155 pounds with a BMI of 28.47 which is WNL per geriatric standards. Recommend to continue diet as tolerated. Encourage po intakes. RD Following.           Learning Needs/Social Determinants of Health    Learning Assessment       02/08/2024 1344 [facility] (2/8/2024 - Present)   Created by Salima Dia, RN - RN (Nurse) Status: Complete                 PRIMARY LEARNER     Primary Learner Name:  Courtney Pickens  - 02/08/2024 1344    Relationship:  Patient  - 02/08/2024 1344    Does the primary learner have any barriers to learning?:  No Barriers  - 02/08/2024 1344    What is the preferred language of the primary learner?:  English  - 02/08/2024 1344    Is an  required?:  No  - 02/08/2024 1344    How does the primary learner prefer to learn new concepts?:  Listening  - 02/08/2024 1344    How often do you need to have someone help you read instructions, pamphlets, or written material from your doctor or pharmacy?:  Never  - 02/08/2024 1344        CO-LEARNER #1     No question answered        CO-LEARNER #2     No question answered        SPECIAL TOPICS     No question answered        ANSWERED BY:     No question answered        Edit History       Salima Dia, RN - RN (Nurse)   02/08/2024 1344                          Social Determinants of Health     Tobacco Use: Low Risk  (2/6/2022)    Patient History     Smoking Tobacco Use: Never     Smokeless Tobacco Use:  Never     Passive Exposure: Not on file   Alcohol Use: Not At Risk (2/8/2024)    AUDIT-C     Frequency of Alcohol Consumption: Never     Average Number of Drinks: Patient does not drink     Frequency of Binge Drinking: Never   Financial Resource Strain: Low Risk  (2/8/2024)    Overall Financial Resource Strain (CARDIA)     Difficulty of Paying Living Expenses: Not hard at all   Food Insecurity: No Food Insecurity (2/8/2024)    Hunger Vital Sign     Worried About Running Out of Food in the Last Year: Never true     Ran Out of Food in the Last Year: Never true   Transportation Needs: No Transportation Needs (2/8/2024)    PRAPARE - Transportation     Lack of Transportation (Medical): No     Lack of Transportation (Non-Medical): No   Physical Activity: Inactive (2/8/2024)    Exercise Vital Sign     Days of Exercise per Week: 0 days     Minutes of Exercise per Session: 0 min   Stress: No Stress Concern Present (2/8/2024)    [place] [employer] - Occupational Stress Questionnaire     Feeling of Stress : Only a little   Social Connections: Moderately Isolated (2/8/2024)    Social Connection and Isolation Panel [NHANES]     Frequency of Communication with Friends and Family: More than three times a week     Frequency of Social Gatherings with Friends and Family: More than three times a week     Attends Adventism Services: More than 4 times per year     Active Member of Clubs or Organizations: No     Attends Club or Organization Meetings: Never     Marital Status:    Housing Stability: Low Risk  (2/8/2024)    Housing Stability Vital Sign     Unable to Pay for Housing in the Last Year: No     Number of Places Lived in the Last Year: 1     Unstable Housing in the Last Year: No   Depression: Not on file          Malnutrition  Is Patient Malnourished: No    Skin Integrity  Marvel Risk Assessment  Sensory Perception: 3-->slightly limited  Moisture: 4-->rarely moist  Activity: 3-->walks  occasionally  Mobility: 3-->slightly limited  Nutrition: 3-->adequate  Friction and Shear: 2-->potential problem  Marvel Score: 18  Comments on skin integrity:     Nutrition Diagnosis  Decreased nutrient needs of Sodium  related to Heart Failure and hyponatremia as evidenced by dx R Heart failure  Comments:         Recent Labs   Lab 02/09/24  0515        Comments on Glucose: wnl    Nutrition Prescription / Recommendations  Recommendation/Intervention: continue diet as tolerated; encourage po intake  Goals: po intake 50-75%; weight maintenance during admission  Nutrition Goal Status: new  Current Diet Order: 2 gm Na  Chewing or Swallowing Difficulty?: No Chewing or swallowing difficulty  Recommended Diet: Low Sodium ( 2g Sodium)  Recommended Oral Supplement: No Oral Supplements  Is Nutrition Support Recommended: [facility] Nutrition Support: No  Is Nutrition Education Recommended: No    Monitor and Evaluation  % current Intake: P.O. intake of 50 - 75 %  % intake to meet estimated needs: 50 - 75 %  Food and Nutrient Intake: energy intake, food and beverage intake  Food and Nutrient Adminstration: diet order  Anthropometric Measurements: height/length, weight, body mass index, weight change  Biochemical Data, Medical Tests and Procedures: electrolyte and renal panel, glucose/endocrine profile, lipid profile, inflammatory profile, gastrointestinal profile       Current Medical Diagnosis and Past Medical History     Past Medical History:   Diagnosis Date    Anxiety     Ocasio's esophagus     CHF (congestive heart failure)     Depression     GERD (gastroesophageal reflux disease)     Hypothyroidism     ILYA (obstructive sleep apnea)     Pneumonia     Pulmonary HTN        Nutrition/Diet History  Spiritual, Cultural Beliefs, Buddhism Practices, Values that Affect Care: no  Food Allergies: NKFA    Lab/Procedures/Meds  Recent Labs   Lab 02/09/24  0515   *   K 2.9*   BUN 25*   CREATININE 1.55*   CALCIUM 8.8  "  CL 94*   Na low: noted fluids restricted  K low; consider replete  BUN/CR elevated; previous HD x 3 per MD notes  Last A1c: No results found for: "HGBA1C"  Lab Results   Component Value Date    RBC 3.58 (L) 02/09/2024    HGB 11.1 (L) 02/09/2024    HCT 34.3 (L) 02/09/2024    MCV 95.8 02/09/2024    MCH 31.0 02/09/2024    MCHC 32.4 02/09/2024    TIBC 301 01/29/2024     Pertinent Labs Reviewed: reviewed  Pertinent Medications Reviewed: reviewed  Scheduled Meds:   albuterol-ipratropium  3 mL Nebulization Q6H WAKE    apixaban  2.5 mg Oral BID    budesonide  0.5 mg Nebulization Q12H    [START ON 3/8/2024] cyanocobalamin  1,000 mcg Intramuscular Q30 Days    diltiaZEM  120 mg Oral Daily    DULoxetine  60 mg Oral Daily    [START ON 2/15/2024] ergocalciferol  50,000 Units Oral Q7 Days    estradioL  1 mg Oral Daily    ferrous sulfate  1 tablet Oral Daily    furosemide  60 mg Oral BID    gentamicin  1 drop Right Eye Q4H While awake    pantoprazole  40 mg Oral Daily    potassium chloride  20 mEq Oral BID    rOPINIRole  2 mg Oral Daily    rOPINIRole  4 mg Oral QHS    sertraline  25 mg Oral Daily    thyroid (pork)  120 mg Oral Before breakfast    traZODone  100 mg Oral QHS    vancomycin  125 mg Oral Q6H    [START ON 2/22/2024] vancomycin  125 mg Oral BID    [START ON 3/1/2024] vancomycin  125 mg Oral Daily    [START ON 3/9/2024] vancomycin  125 mg Oral Every other day     Continuous Infusions:  PRN Meds:.acetaminophen, acetaminophen, albuterol sulfate, calcium carbonate, melatonin, ondansetron, senna-docusate 8.6-50 mg      Anthropometrics  Temp: 97.6 °F (36.4 °C)  Height: 5' 2" (157.5 cm)  Height (inches): 62 in  Weight Method: Bed Scale  Weight: 70.6 kg (155 lb 10.3 oz)  Weight (lb): 155.65 lb  Ideal Body Weight (IBW), Female: 110 lb  % Ideal Body Weight, Female (lb): 140.18 %  BMI (Calculated): 28.5  BMI Grade: 25 - 29.9 - overweight       Estimated/Assessed Needs      Temp: 97.6 °F (36.4 °C)Oral  Weight Used For Calorie " Calculations: 70.3 kg (155 lb)   Energy Need Method: Kcal/kg Energy Calorie Requirements (kcal): 4708-3542  Weight Used For Protein Calculations: 70.3 kg (155 lb)  Protein Requirements: 56-70  Estimated Fluid Requirement Method: RDA Method    RDA Method (mL): 1757       Nutrition by Nursing  Diet/Nutrition Received: 2 gram sodium  Intake (%): 50%  Diet/Feeding Assistance: tray set-up  Diet/Feeding Tolerance: fair  Last Bowel Movement: 02/10/24                Nutrition Follow-Up  RD Follow-up?: Yes      Nutrition Discharge Planning: too soon to determine

## 2024-02-12 NOTE — PROGRESS NOTES
Clinical Pharmacy Chart Review Note      Admit Date: 2/8/2024   LOS: 4 days       Courtney Pickens is a 83 y.o. female admitted to SNF for PT/OT after hospitalization for weakness.    Active Hospital Problems    Diagnosis  POA    *Weakness [R53.1]  Yes    Dementia without behavioral disturbance [F03.90]  Yes    Hypothyroid [E03.9]  Yes    Pulmonary HTN [I27.20]  Yes     Patient has pulmonary hypertension probably secondary to cardiac disease to finish up workup I will get PFTs and chest x-ray and color flow venous Dopplers BUSTER and rheumatoid factor and see her back in clinic in 1 month the finish upper workup      Right heart failure [I50.810]  Yes     Trans to [facility] for cardiology evaluation        Resolved Hospital Problems   No resolved problems to display.     Review of patient's allergies indicates:   Allergen Reactions    Codeine     Diflucan [fluconazole] Hives    Lisinopril     Opioids - morphine analogues      Patient Active Problem List    Diagnosis Date Noted    Weakness 02/09/2024    Dementia without behavioral disturbance 02/09/2024    Acute alteration in mental status 12/17/2021    Gastroesophageal reflux disease without esophagitis 12/15/2021    Other insomnia 12/15/2021    Depression with anxiety 12/15/2021    Aortic stenosis 12/09/2021    ILYA (obstructive sleep apnea) 12/08/2021    Failure to thrive in adult 12/07/2021    Atrial fibrillation 12/07/2021    Fracture of coccyx 12/06/2021    Hypothyroid 12/06/2021    Pleural effusion 12/04/2021    Pulmonary HTN 12/02/2021    Right heart failure 12/02/2021    Hyponatremia 12/01/2021    Pneumonia 11/19/2021    Essential hypertension 11/19/2021       Scheduled Meds:    albuterol-ipratropium  3 mL Nebulization Q6H WAKE    apixaban  2.5 mg Oral BID    budesonide  0.5 mg Nebulization Q12H    [START ON 3/8/2024] cyanocobalamin  1,000 mcg Intramuscular Q30 Days    diltiaZEM  120 mg Oral Daily    DULoxetine  60 mg Oral Daily    [START ON 2/15/2024] ergocalciferol   50,000 Units Oral Q7 Days    estradioL  1 mg Oral Daily    ferrous sulfate  1 tablet Oral Daily    furosemide  60 mg Oral BID    gentamicin  1 drop Right Eye Q4H While awake    pantoprazole  40 mg Oral Daily    potassium chloride  20 mEq Oral BID    rOPINIRole  2 mg Oral Daily    rOPINIRole  4 mg Oral QHS    sertraline  25 mg Oral Daily    thyroid (pork)  120 mg Oral Before breakfast    traZODone  100 mg Oral QHS    vancomycin  125 mg Oral Q6H    [START ON 2/22/2024] vancomycin  125 mg Oral BID    [START ON 3/1/2024] vancomycin  125 mg Oral Daily    [START ON 3/9/2024] vancomycin  125 mg Oral Every other day     Continuous Infusions:   PRN Meds: acetaminophen, acetaminophen, albuterol sulfate, calcium carbonate, melatonin, ondansetron, senna-docusate 8.6-50 mg    OBJECTIVE:     Vital Signs (Last 24H)  Temp:  [97.5 °F (36.4 °C)-97.6 °F (36.4 °C)]   Pulse:  [56-65]   Resp:  [18-23]   BP: (120-123)/(54-57)   SpO2:  [93 %-100 %]     Laboratory:  CBC:   Recent Labs   Lab 02/09/24  0515   WBC 7.04   RBC 3.58*   HGB 11.1*   HCT 34.3*      MCV 95.8   MCH 31.0   MCHC 32.4     BMP:   Recent Labs   Lab 02/06/24  0627 02/09/24  0515   GLU  --  104   * 133*   K 3.6 2.9*   CL 95* 94*   CO2 28 29   BUN 25 25*   CREATININE 1.63* 1.55*   CALCIUM 8.6* 8.8     .    ASSESSMENT/PLAN:     Estimated Creatinine Clearance: 25.8 mL/min (A) (based on SCr of 1.55 mg/dL (H)).  Medications reviewed, no dose adjustments needed . Weekly swing bed medication regimen review completed. Will continue to monitor.

## 2024-02-12 NOTE — PT/OT/SLP PROGRESS
"Occupational Therapy  Treatment    Courtney Pickens   MRN: [id number]   Admitting Diagnosis: Weakness    OT Date of Treatment: 24   OT Start Time: 815  OT Stop Time: 905  OT Total Time (min): 50 min    Billable Minutes:  Self Care/Home Management 30 and Therapeutic Exercise 20    OT/CRISTINA: OT          General Precautions: Standard, fall, contact  Orthopedic Precautions: Full weight bearing  Braces: N/A  Respiratory Status: Nasal cannula, flow 2 L/min; when pt is off oxygen, her sats fall to below 80.  2 L of oxygen this morning provided through nasal cannula allowed pt sats to rise back to approx 88% in resting.         Subjective:  Communicated with pt and CNA and nursing prior to session.  Pt told nursing she had already had her medicine.  Pt also told OT she had already had her bath and bed cleaned up.  Nursing indicated that this did not occur this shift.  Pt also reported by CNA to have been asking where "Jerrod" is through the night.  Jerrod is pt  .         Objective:  Patient found with: oxygen     Functional Mobility:  Bed Mobility: HEAVY min a today       Transfers: MIN A overall        Functional Ambulation: none with OT today    Activities of Daily Living:     Feeding adaptive equipment:  none needed     UE adaptive equipment: none needed     LE adaptive equipment: pt not quite ready for LB equipment and may not be appropriate for equipment due to cognitive status. Will cont to monitor progress.                    Bathing adaptive equipment: needs shower chair and hand held shower nozzle for home    Balance:   Static Sit: FAIR: Maintains without assist, but unable to take any challenges   Dynamic Sit: FAIR: Cannot move trunk without losing balance  Static Stand: FAIR: Maintains without assist but unable to take challenges  Dynamic stand: FAIR: Needs CONTACT GUARD during gait    Therapeutic Activities and Exercises:  Pt completed ADLs in room with OT including toileting at [facility] (due to " "short length of oxygen tubing), oral hygiene, and grooming at sinkside.  Pt required more assist to get out of bed today; pt found to be with increased tightness and edema throughout LE as well as pockets of fluid in her BUE's around elbow. She indicated these areas were painful.  No weeping noted today.  Nursing assessed and aware.  Pt refused bathing but was agreeable to washing perineal area after getting onto the BSC with assist from OT.  Pt required min a with donning PJ pants, max assist with donning brief, and also max assist with socks today.    Pt was able to wipe front and back saulo area with washcloths.  However, she required repeated cueing to wipe front to back rather than back to front to avoid cross contamination.  She indicated she understood but was unable to effectually take in information without further cueing.  Upon completing toileting, OT set her up at sinkside to complete oral hygiene, needing OT to apply toothpaste to toothbrush.  Pt also did not want to stand to complete task today.  She finished oral hygiene with OT at side providing repeated cueing for effectiveness.  Additionally, after OT provided hairbrush to pt, she combed her hair out well.        She was agreeable to go for a "bit" of exercise and was able to complete UBE per OT directives at slow speed to encourage draining of fluid from BUE.    Upon getting back to her room, OT had pt wash hands at sinkside thoroughly before leaving her in care of CNA.    AM-PAC 6 CLICK ADL   How much help from another person does this patient currently need?   1 = Unable, Total/Dependent Assistance  2 = A lot, Maximum/Moderate Assistance  3 = A little, Minimum/Contact Guard/Supervision  4 = None, Modified [place]/Independent    Putting on and taking off regular lower body clothing? : 3  Bathing (including washing, rinsing, drying)?: 3  Toileting, which includes using toilet, bedpan, or urinal? : 3  Putting on and taking off regular upper " "body clothing?: 3  Taking care of personal grooming such as brushing teeth?: 3  Eating meals?: 4  Daily Activity Total Score: 19     AM-PAC Raw Score CMS "G-Code Modifier Level of Impairment Assistance   6 % Total / Unable   7 - 8 CM 80 - 100% Maximal Assist   9-13 CL 60 - 80% Moderate Assist   14 - 19 CK 40 - 60% Moderate Assist   20 - 22 CJ 20 - 40% Minimal Assist   23 CI 1-20% SBA / CGA   24 CH 0% Independent/ Mod I       Patient left up in chair with call button in reach, chair alarm on, and CNA present    ASSESSMENT:  Courtney Pickens is a 83 y.o. female with a medical diagnosis of Weakness and presents with increased pitting edema in BLE and pockets in UE.    Rehab identified problem list/impairments:  weakness, impaired endurance, impaired self care skills, impaired functional mobility, gait instability, impaired balance, impaired cognition, decreased safety awareness, edema, impaired cardiopulmonary response to activity    Rehab potential is fair.    Activity tolerance: Fair    Discharge recommendations:     Barriers to discharge: Barriers to Discharge: None    Equipment recommendations:  (TBD)    GOALS:   Multidisciplinary Problems       Occupational Therapy Goals          Problem: Occupational Therapy    Goal Priority Disciplines Outcome Interventions   Occupational Therapy Goal     OT, PT/OT Ongoing, Progressing    Description: STG: within 2 weeks  Pt will perform grooming with setup and independence ONGOING/PROGRESSING  Pt will bathe with SVN ONGOING/PROGRESSING  Pt will perform UE dressing with SVN ONGOING/PROGRESSING  Pt will perform LE dressing with SVN ONGOING/PROGRESSING  Pt will sit EOB x 30 min with SVN assistance ONGOING/PROGRESSING  Pt will transfer bed/chair/bsc with SVN ONGOING/PROGRESSING  Pt will perform standing task x 5 min with SVN assistance ONGOING/PROGRESSING  Pt will tolerate 35 minutes of tx without fatigue ONGOING/PROGRESSING      LT.Restore to max I with self care and " mobility.                         Plan:  Patient to be seen 5 x/week to address the above listed problems via self-care/home management, therapeutic activities, therapeutic exercises  Plan of Care expires: 02/29/24  Plan of Care reviewed with: patient, caregiver         02/12/2024

## 2024-02-12 NOTE — PLAN OF CARE
Problem: Fall Injury Risk  Goal: Absence of Fall and Fall-Related Injury  Outcome: Ongoing, Progressing  Intervention: Identify and Manage Contributors  Flowsheets (Taken 2/11/2024 1828)  Self-Care Promotion:   independence encouraged   BADL personal objects within reach  Medication Review/Management: medications reviewed  Intervention: Promote Injury-Free Environment  Flowsheets (Taken 2/11/2024 1828)  Safety Promotion/Fall Prevention:   assistive device/personal item within reach   bed alarm set   chair alarm set   Fall Risk signage in place   bedside commode chair   lighting adjusted   medications reviewed   nonskid shoes/socks when out of bed   /camera at bedside   side rails raised x 3   room near unit station   instructed to call staff for mobility

## 2024-02-12 NOTE — PROGRESS NOTES
[facility]  Hospital Medicine  Progress Note    Patient Name: Courtney Pickens  MRN: [id number]  Patient Class: IP- Swing   Admission Date: 2/8/2024  Length of Stay: 4 days  Attending Physician: Austin Valencia DO  Primary Care Provider: Bertha Mccall FNP        Subjective:     Principal Problem:Weakness        HPI:  Mrs. Pickens is a pleasant 82 y/o  female with a PMHx of ILYA, Pulmonary HTN, Atrial Fibrillation, Thyroid Disease, Depression, Moderate Tricuspid Regurgitation, Moderate Aortic Regurgitation, COPD, and Venous Insufficiency was admitted to [facility] on 1/31/2024 for volume overload despite diuresis, kidney failure, metabolic encephalopathy and C-Difficle Colilitis.  Patient had a temporary dialysis catheter placed and underwent three rounds of hemodialysis before having a successful lasix trial. Patient was evaluated by Cardiology, echocardiogram showed a LVEF of 55-60% and worsening Tricuspid Regurgitation with a RSVP of 93 mmHg. Patient was placed on a 1,000 ml/24 hr fluid restriction. Patient requiring supplemental oxygen via nasal cannula at 2 LMP. Kidney function improved and anasarca resolved, patient was discharged here to [facility] for Inpatient Swing Bed [facility].     Overview/Hospital Course:  ADAN Baptiste RN reports is unable to swallow Kcl tablets, changed to efferK    2/12 NO major issues.  Says she wants to go home.  Fluid status seems stable.     Interval History: about the same, no major issues, she wants to go home.    Review of Systems   Constitutional:  Positive for appetite change and fatigue.   Respiratory:  Negative for shortness of breath.    Cardiovascular:  Negative for chest pain.   Gastrointestinal:  Negative for abdominal pain, nausea and vomiting.   Musculoskeletal:  Positive for arthralgias.   Neurological:  Positive for weakness.   Psychiatric/Behavioral:  Positive for confusion. Negative for agitation.    All  other systems reviewed and are negative.    Objective:     Vital Signs (Most Recent):  Temp: 97.5 °F (36.4 °C) (02/12/24 0719)  Pulse: 61 (02/12/24 0747)  Resp: 18 (02/12/24 0747)  BP: (!) 120/54 (02/12/24 0719)  SpO2: 100 % (02/12/24 0747) Vital Signs (24h Range):  Temp:  [97.5 °F (36.4 °C)-97.6 °F (36.4 °C)] 97.5 °F (36.4 °C)  Pulse:  [56-65] 61  Resp:  [18-23] 18  SpO2:  [93 %-100 %] 100 %  BP: (120-123)/(54-57) 120/54     Weight: 70.9 kg (156 lb 4.9 oz)  Body mass index is 28.59 kg/m².    Intake/Output Summary (Last 24 hours) at 2/12/2024 1203  Last data filed at 2/12/2024 0935  Gross per 24 hour   Intake 778 ml   Output 500 ml   Net 278 ml         Physical Exam  Vitals reviewed.   Constitutional:       General: She is not in acute distress.     Appearance: Normal appearance. She is not toxic-appearing.   HENT:      Head: Normocephalic and atraumatic.   Eyes:      General: No scleral icterus.     Extraocular Movements: Extraocular movements intact.      Conjunctiva/sclera: Conjunctivae normal.      Pupils: Pupils are equal, round, and reactive to light.   Cardiovascular:      Rate and Rhythm: Normal rate and regular rhythm.      Heart sounds: No murmur heard.     No friction rub. No gallop.   Pulmonary:      Effort: Pulmonary effort is normal. No respiratory distress.      Breath sounds: Normal breath sounds. No wheezing or rales.   Abdominal:      General: Abdomen is flat. Bowel sounds are normal. There is no distension.      Palpations: Abdomen is soft.      Tenderness: There is no abdominal tenderness. There is no guarding.   Musculoskeletal:         General: No swelling.      Right lower leg: Edema present.      Left lower leg: Edema present.   Skin:     General: Skin is warm and dry.      Coloration: Skin is not jaundiced.      Findings: No rash.   Neurological:      General: No focal deficit present.      Mental Status: She is alert. Mental status is at baseline.      Sensory: No sensory deficit.       Motor: Weakness present.   Psychiatric:         Mood and Affect: Mood normal.         Behavior: Behavior normal.             Significant Labs: All pertinent labs within the past 24 hours have been reviewed.  Recent Lab Results       None            Significant Imaging: I have reviewed all pertinent imaging results/findings within the past 24 hours.    Assessment/Plan:      Dementia without behavioral disturbance  Monitor behavior.      Hypothyroid  On meds.      Right heart failure  Monitor resp status and fluid status closely.       Pulmonary HTN          VTE Risk Mitigation (From admission, onward)           Ordered     apixaban tablet 2.5 mg  2 times daily         02/09/24 1319                    Discharge Planning   DOREEN:      Code Status: DNR   Is the patient medically ready for discharge?:     Reason for patient still in hospital (select all that apply): Patient trending condition and PT / OT recommendations  Discharge Plan A: Home Health                  Austin Valencia DO  Department of Hospital Medicine   [facility]

## 2024-02-12 NOTE — PLAN OF CARE
Problem: SLP  Goal: SLP Goal  Description: Pt will demonstrate orientation to person, place, time with 80% accuracy.  Pt will recall 2-3 details with 80% accuracy.   Pt will demonstrate ID safe/unsafe situations with 70% accuracy.   Pt will maintain attention to task 4-5 minutes with 80% accuracy.   Pt will demonstrate problem solving and reasoning skills with 80% accuracy.   Patient will complete vocal fold adduction/airway protection tasks with 90% accuracy  Outcome: Ongoing, Progressing

## 2024-02-12 NOTE — PT/OT/SLP PROGRESS
Physical Therapy Treatment    Patient Name:  Courtney Pickens   MRN:  [id number]    Recommendations:     Discharge Recommendations: Low Intensity Therapy  Discharge Equipment Recommendations: none  Barriers to discharge: Inaccessible home, Decreased caregiver support, and high falls risk    Assessment:     Courtney Pickens is a 83 y.o. female admitted with a medical diagnosis of Weakness.  She presents with the following impairments/functional limitations: weakness, impaired endurance, impaired functional mobility, gait instability, impaired balance, impaired cognition, decreased coordination, decreased lower extremity function, decreased ROM, edema, impaired cardiopulmonary response to activity .    Pt's BLE more edematous today. Pt improving with gait and transfers, but fatigues easily.    Rehab Prognosis: Fair; patient would benefit from acute skilled PT services to address these deficits and reach maximum level of function.    Recent Surgery: * No surgery found *      Plan:     During this hospitalization, patient to be seen 5 x/week to address the identified rehab impairments via gait training, therapeutic activities, therapeutic exercises, neuromuscular re-education and progress toward the following goals:    Plan of Care Expires:       Subjective     Chief Complaint: Pt c/o not feeling well today,but did not eleborate.  Patient/Family Comments/goals: Possible dc to ns home.  Pain/Comfort:  Pain Rating 1: 0/10      Objective:     Communicated with pt, Angie Akhtar CNA prior to session.  Patient found HOB elevated with bed alarm upon PT entry to room.     General Precautions: Standard, fall  Orthopedic Precautions:    Braces:    Respiratory Status: Nasal cannula, flow 2 L/min     Functional Mobility:  Bed Mobility:     Rolling Left:  stand by assistance  Scooting: moderate assistance  Supine to Sit: contact guard assistance  Sit to Supine: moderate assistance  Transfers:     Sit to Stand:  contact guard  "assistance with rolling walker  Bed to Chair: minimum assistance with  rolling walker  using  Stand Pivot  Gait: Pt amb'd x 20 ft with min Ax1/CGA using RW/O2 at 3L. Pt has slow gait with foot flat init contact.      AM-PAC 6 CLICK MOBILITY  Turning over in bed (including adjusting bedclothes, sheets and blankets)?: 4  Sitting down on and standing up from a chair with arms (e.g., wheelchair, bedside commode, etc.): 3  Moving from lying on back to sitting on the side of the bed?: 3  Moving to and from a bed to a chair (including a wheelchair)?: 3  Need to walk in hospital room?: 3  Climbing 3-5 steps with a railing?: 1  Basic Mobility Total Score: 17       Treatment & Education:  Bed ex's BLE 2x10 reps each: hip abd, HS, AP 1x20, SAQ. Passive stretching 1x30" jeffrey calves.    Patient left HOB elevated with all lines intact..Bed alarm on.    GOALS:   Multidisciplinary Problems       Physical Therapy Goals          Problem: Physical Therapy    Goal Priority Disciplines Outcome Goal Variances Interventions   Physical Therapy Goal     PT, PT/OT Ongoing, Progressing     Description: LTG - 3 weeks  1. Pt svn with bed mob.  2. Pt SBA with transfers.  3. Pt will amb 100 ft with SBA using RW.  4. Pt will have 4/5 gross BLE strength.  5. Pt's Tinetti test score will be at least 12/28 points.                       Time Tracking:     PT Received On: 02/12/24  PT Start Time: 1431     PT Stop Time: 1456  PT Total Time (min): 25 min     Billable Minutes: Gait Training 8, Therapeutic Activity 5, Therapeutic Exercise 12, and Total Time 25 minutes    Treatment Type: Treatment  PT/PTA: PT     Number of PTA visits since last PT visit: 0     02/12/2024  "

## 2024-02-13 LAB
ANION GAP SERPL CALCULATED.3IONS-SCNC: 13 MMOL/L (ref 7–16)
BUN SERPL-MCNC: 34 MG/DL (ref 7–18)
BUN/CREAT SERPL: 17 (ref 6–20)
CALCIUM SERPL-MCNC: 9.1 MG/DL (ref 8.5–10.1)
CHLORIDE SERPL-SCNC: 95 MMOL/L (ref 98–107)
CO2 SERPL-SCNC: 29 MMOL/L (ref 21–32)
CREAT SERPL-MCNC: 2.01 MG/DL (ref 0.55–1.02)
EGFR (NO RACE VARIABLE) (RUSH/TITUS): 24 ML/MIN/1.73M2
GLUCOSE SERPL-MCNC: 117 MG/DL (ref 74–106)
POTASSIUM SERPL-SCNC: 4.5 MMOL/L (ref 3.5–5.1)
SODIUM SERPL-SCNC: 132 MMOL/L (ref 136–145)

## 2024-02-13 PROCEDURE — 25000003 PHARM REV CODE 250

## 2024-02-13 PROCEDURE — 97535 SELF CARE MNGMENT TRAINING: CPT

## 2024-02-13 PROCEDURE — 11000004 HC SNF PRIVATE

## 2024-02-13 PROCEDURE — 94640 AIRWAY INHALATION TREATMENT: CPT

## 2024-02-13 PROCEDURE — 25000003 PHARM REV CODE 250: Performed by: NURSE PRACTITIONER

## 2024-02-13 PROCEDURE — 25000003 PHARM REV CODE 250: Performed by: HOSPITALIST

## 2024-02-13 PROCEDURE — 27000221 HC OXYGEN, UP TO 24 HOURS

## 2024-02-13 PROCEDURE — 94761 N-INVAS EAR/PLS OXIMETRY MLT: CPT

## 2024-02-13 PROCEDURE — 27000958

## 2024-02-13 PROCEDURE — 99900035 HC TECH TIME PER 15 MIN (STAT)

## 2024-02-13 PROCEDURE — 92526 ORAL FUNCTION THERAPY: CPT

## 2024-02-13 PROCEDURE — 25000242 PHARM REV CODE 250 ALT 637 W/ HCPCS

## 2024-02-13 PROCEDURE — 80048 BASIC METABOLIC PNL TOTAL CA: CPT | Performed by: HOSPITALIST

## 2024-02-13 PROCEDURE — 97110 THERAPEUTIC EXERCISES: CPT | Mod: CQ

## 2024-02-13 PROCEDURE — 92507 TX SP LANG VOICE COMM INDIV: CPT

## 2024-02-13 RX ORDER — DULOXETIN HYDROCHLORIDE 30 MG/1
30 CAPSULE, DELAYED RELEASE ORAL DAILY
Status: DISCONTINUED | OUTPATIENT
Start: 2024-02-14 | End: 2024-02-14 | Stop reason: HOSPADM

## 2024-02-13 RX ADMIN — THYROID, PORCINE 120 MG: 60 TABLET ORAL at 05:02

## 2024-02-13 RX ADMIN — GENTAMICIN SULFATE 1 DROP: 3 SOLUTION/ DROPS OPHTHALMIC at 10:02

## 2024-02-13 RX ADMIN — ACETAMINOPHEN 650 MG: 325 TABLET ORAL at 05:02

## 2024-02-13 RX ADMIN — FUROSEMIDE 60 MG: 40 TABLET ORAL at 10:02

## 2024-02-13 RX ADMIN — ESTRADIOL 1 MG: 1 TABLET ORAL at 10:02

## 2024-02-13 RX ADMIN — BUDESONIDE INHALATION 0.5 MG: 0.5 SUSPENSION RESPIRATORY (INHALATION) at 08:02

## 2024-02-13 RX ADMIN — TRAZODONE HYDROCHLORIDE 100 MG: 100 TABLET ORAL at 08:02

## 2024-02-13 RX ADMIN — DILTIAZEM HYDROCHLORIDE 120 MG: 120 CAPSULE, COATED, EXTENDED RELEASE ORAL at 10:02

## 2024-02-13 RX ADMIN — IPRATROPIUM BROMIDE AND ALBUTEROL SULFATE 3 ML: 2.5; .5 SOLUTION RESPIRATORY (INHALATION) at 07:02

## 2024-02-13 RX ADMIN — POTASSIUM CHLORIDE 20 MEQ: 750 CAPSULE, EXTENDED RELEASE ORAL at 10:02

## 2024-02-13 RX ADMIN — ROPINIROLE HYDROCHLORIDE 4 MG: 1 TABLET, FILM COATED ORAL at 08:02

## 2024-02-13 RX ADMIN — ACETAMINOPHEN 650 MG: 325 TABLET ORAL at 08:02

## 2024-02-13 RX ADMIN — GENTAMICIN SULFATE 1 DROP: 3 SOLUTION/ DROPS OPHTHALMIC at 09:02

## 2024-02-13 RX ADMIN — MICONAZOLE NITRATE ANTIFUNGAL POWDER: 2 POWDER TOPICAL at 10:02

## 2024-02-13 RX ADMIN — SERTRALINE HYDROCHLORIDE 25 MG: 25 TABLET ORAL at 10:02

## 2024-02-13 RX ADMIN — FUROSEMIDE 60 MG: 40 TABLET ORAL at 06:02

## 2024-02-13 RX ADMIN — MICONAZOLE NITRATE ANTIFUNGAL POWDER: 2 POWDER TOPICAL at 08:02

## 2024-02-13 RX ADMIN — PANTOPRAZOLE SODIUM 40 MG: 40 TABLET, DELAYED RELEASE ORAL at 10:02

## 2024-02-13 RX ADMIN — VANCOMYCIN HYDROCHLORIDE 125 MG: 125 CAPSULE ORAL at 11:02

## 2024-02-13 RX ADMIN — GENTAMICIN SULFATE 1 DROP: 3 SOLUTION/ DROPS OPHTHALMIC at 02:02

## 2024-02-13 RX ADMIN — FERROUS SULFATE TAB 325 MG (65 MG ELEMENTAL FE) 1 EACH: 325 (65 FE) TAB at 10:02

## 2024-02-13 RX ADMIN — GENTAMICIN SULFATE 1 DROP: 3 SOLUTION/ DROPS OPHTHALMIC at 05:02

## 2024-02-13 RX ADMIN — IPRATROPIUM BROMIDE AND ALBUTEROL SULFATE 3 ML: 2.5; .5 SOLUTION RESPIRATORY (INHALATION) at 02:02

## 2024-02-13 RX ADMIN — POTASSIUM CHLORIDE 20 MEQ: 750 CAPSULE, EXTENDED RELEASE ORAL at 08:02

## 2024-02-13 RX ADMIN — VANCOMYCIN HYDROCHLORIDE 125 MG: 125 CAPSULE ORAL at 06:02

## 2024-02-13 RX ADMIN — APIXABAN 2.5 MG: 2.5 TABLET, FILM COATED ORAL at 10:02

## 2024-02-13 RX ADMIN — MELATONIN 6 MG: at 08:02

## 2024-02-13 RX ADMIN — ROPINIROLE HYDROCHLORIDE 2 MG: 1 TABLET, FILM COATED ORAL at 10:02

## 2024-02-13 RX ADMIN — BUDESONIDE INHALATION 0.5 MG: 0.5 SUSPENSION RESPIRATORY (INHALATION) at 07:02

## 2024-02-13 RX ADMIN — APIXABAN 2.5 MG: 2.5 TABLET, FILM COATED ORAL at 08:02

## 2024-02-13 RX ADMIN — VANCOMYCIN HYDROCHLORIDE 125 MG: 125 CAPSULE ORAL at 05:02

## 2024-02-13 RX ADMIN — DULOXETINE HYDROCHLORIDE 60 MG: 60 CAPSULE, DELAYED RELEASE ORAL at 10:02

## 2024-02-13 RX ADMIN — GENTAMICIN SULFATE 1 DROP: 3 SOLUTION/ DROPS OPHTHALMIC at 06:02

## 2024-02-13 NOTE — CONSULTS
[facility]  Adult Nutrition  Follow up          Reason for Assessment  Reason For Assessment: consult SWB patient  Nutrition Risk Screen: no indicators present    Assessment and Plan  2/13/2024 RD Follow up: Patient is ordered a 2 gm Na+ diet. Po intake  25-50% per flowsheets. Patient with episodes of confusion and requires redirection. Noted SLP recommendations.     Recommend to add Boost Plus BID for additional po intake. Assist with meals as needed. RD Following.     Consult received and appreciated. Patient admitted with dx of weakness, dementia, R heart failure, and pulmonary HTN. Patient is ordered a 2 gm Na+ diet. Po intake 50-75% per flowsheets. No chewing/swallowing issues noted per nursing screen. Noted SLP evaluation.    Current weight is 155 pounds with a BMI of 28.47 which is WNL per geriatric standards. Recommend to continue diet as tolerated. Encourage po intakes. RD Following.           Learning Needs/Social Determinants of Health    Learning Assessment       02/08/2024 1344 [facility] (2/8/2024 - Present)   Created by Salima Dia, RN - RN (Nurse) Status: Complete                 PRIMARY LEARNER     Primary Learner Name:  Courtney Pickens  - 02/08/2024 1344    Relationship:  Patient  - 02/08/2024 1344    Does the primary learner have any barriers to learning?:  No Barriers  - 02/08/2024 1344    What is the preferred language of the primary learner?:  English  - 02/08/2024 1344    Is an  required?:  No  - 02/08/2024 1344    How does the primary learner prefer to learn new concepts?:  Listening  - 02/08/2024 1344    How often do you need to have someone help you read instructions, pamphlets, or written material from your doctor or pharmacy?:  Never  - 02/08/2024 1344        CO-LEARNER #1     No question answered        CO-LEARNER #2     No question answered        SPECIAL TOPICS     No question answered         ANSWERED BY:     No question answered        Edit History       Salima Dia, RN - RN (Nurse)   02/08/2024 1344                          Social Determinants of Health     Tobacco Use: Low Risk  (2/6/2022)    Patient History     Smoking Tobacco Use: Never     Smokeless Tobacco Use: Never     Passive Exposure: Not on file   Alcohol Use: Not At Risk (2/8/2024)    AUDIT-C     Frequency of Alcohol Consumption: Never     Average Number of Drinks: Patient does not drink     Frequency of Binge Drinking: Never   Financial Resource Strain: Low Risk  (2/8/2024)    Overall Financial Resource Strain (CARDIA)     Difficulty of Paying Living Expenses: Not hard at all   Food Insecurity: No Food Insecurity (2/8/2024)    Hunger Vital Sign     Worried About Running Out of Food in the Last Year: Never true     Ran Out of Food in the Last Year: Never true   Transportation Needs: No Transportation Needs (2/8/2024)    PRAPARE - Transportation     Lack of Transportation (Medical): No     Lack of Transportation (Non-Medical): No   Physical Activity: Inactive (2/8/2024)    Exercise Vital Sign     Days of Exercise per Week: 0 days     Minutes of Exercise per Session: 0 min   Stress: No Stress Concern Present (2/8/2024)    [employer] - Occupational Stress Questionnaire     Feeling of Stress : Only a little   Social Connections: Moderately Isolated (2/8/2024)    Social Connection and Isolation Panel [NHANES]     Frequency of Communication with Friends and Family: More than three times a week     Frequency of Social Gatherings with Friends and Family: More than three times a week     Attends [employer] Services: More than 4 times per year     Active Member of Clubs or Organizations: No     Attends Club or Organization Meetings: Never     Marital Status:    Housing Stability: Low Risk  (2/8/2024)    Housing Stability Vital Sign     Unable to Pay for Housing in the Last Year: No     Number of Places Lived  "in the Last Year: 1     Unstable Housing in the Last Year: No   Depression: Not on file          Malnutrition  Is Patient Malnourished: No    Skin Integrity  Marvel Risk Assessment  Sensory Perception: 3-->slightly limited  Moisture: 3-->occasionally moist  Activity: 3-->walks occasionally  Mobility: 3-->slightly limited  Nutrition: 3-->adequate  Friction and Shear: 2-->potential problem  Marvel Score: 17  Comments on skin integrity:     Nutrition Diagnosis  Decreased nutrient needs of Sodium  related to Heart Failure and hyponatremia as evidenced by dx R Heart failure  Comments:         No results for input(s): "GLU", "POCGLU" in the last 72 hours.    Comments on Glucose: wnl    Nutrition Prescription / Recommendations  Recommendation/Intervention: continue diet as tolerated; encourage po intake  Goals: po intake 50-75%; weight maintenance during admission  Nutrition Goal Status: progressing towards goal  Current Diet Order: 2 gm Na  Chewing or Swallowing Difficulty?: No Chewing or swallowing difficulty  Recommended Diet: Low Sodium ( 2g Sodium)  Recommended Oral Supplement: No Oral Supplements  Is Nutrition Support Recommended: [facility] Support: No  Is Nutrition Education Recommended: No    Monitor and Evaluation  % current Intake: P.O. intake of 50 - 75 %  % intake to meet estimated needs: 50 - 75 %  Food and Nutrient Intake: energy intake, food and beverage intake  Food and Nutrient Adminstration: diet order  Anthropometric Measurements: height/length, weight, body mass index, weight change  Biochemical Data, Medical Tests and Procedures: electrolyte and renal panel, glucose/endocrine profile, lipid profile, inflammatory profile, gastrointestinal profile  Energy Calories Required: not meeting needs  Protein Required: not meeting needs  Tolerance: tolerating    Current Medical Diagnosis and Past Medical History     Past Medical History:   Diagnosis Date    Anxiety     Ocasio's esophagus     CHF " "(congestive heart failure)     Depression     GERD (gastroesophageal reflux disease)     Hypothyroidism     ILYA (obstructive sleep apnea)     Pneumonia     Pulmonary HTN        Nutrition/Diet History  Spiritual, Cultural Beliefs, [employer]ism Practices, Values that Affect Care: no  Food Allergies: NKFA  Factors Affecting Nutritional Intake: behavioral (mealtime), impaired cognitive status/motor control    Lab/Procedures/Meds  No results for input(s): "NA", "K", "BUN", "CREATININE", "CALCIUM", "ALBUMIN", "CL", "ALT", "AST", "PHOS" in the last 72 hours.  Na low: noted fluids restricted  K low; consider replete  BUN/CR elevated; previous HD x 3 per MD notes  Last A1c: No results found for: "HGBA1C"  Lab Results   Component Value Date    RBC 3.58 (L) 02/09/2024    HGB 11.1 (L) 02/09/2024    HCT 34.3 (L) 02/09/2024    MCV 95.8 02/09/2024    MCH 31.0 02/09/2024    MCHC 32.4 02/09/2024    TIBC 301 01/29/2024     Pertinent Labs Reviewed: reviewed  Pertinent Medications Reviewed: reviewed  Scheduled Meds:   albuterol-ipratropium  3 mL Nebulization Q6H WAKE    apixaban  2.5 mg Oral BID    budesonide  0.5 mg Nebulization Q12H    [START ON 3/8/2024] cyanocobalamin  1,000 mcg Intramuscular Q30 Days    diltiaZEM  120 mg Oral Daily    DULoxetine  60 mg Oral Daily    [START ON 2/15/2024] ergocalciferol  50,000 Units Oral Q7 Days    estradioL  1 mg Oral Daily    ferrous sulfate  1 tablet Oral Daily    furosemide  60 mg Oral BID    gentamicin  1 drop Right Eye Q4H While awake    miconazole NITRATE 2 %   Topical (Top) BID    pantoprazole  40 mg Oral Daily    potassium chloride  20 mEq Oral BID    rOPINIRole  2 mg Oral Daily    rOPINIRole  4 mg Oral QHS    sertraline  25 mg Oral Daily    thyroid (pork)  120 mg Oral Before breakfast    traZODone  100 mg Oral QHS    vancomycin  125 mg Oral Q6H    [START ON 2/22/2024] vancomycin  125 mg Oral BID    [START ON 3/1/2024] vancomycin  125 mg Oral Daily    [START ON 3/9/2024] vancomycin  125 mg " "Oral Every other day     Continuous Infusions:  PRN Meds:.acetaminophen, acetaminophen, albuterol sulfate, calcium carbonate, melatonin, ondansetron, senna-docusate 8.6-50 mg      Anthropometrics  Temp: 97.6 °F (36.4 °C)  Height: 5' 2" (157.5 cm)  Height (inches): 62 in  Weight Method: Bed Scale  Weight: 70.9 kg (156 lb 4.9 oz)  Weight (lb): 156.31 lb  Ideal Body Weight (IBW), Female: 110 lb  % Ideal Body Weight, Female (lb): 140.18 %  BMI (Calculated): 28.6  BMI Grade: 25 - 29.9 - overweight       Estimated/Assessed Needs      Temp: 97.6 °F (36.4 °C)Oral  Weight Used For Calorie Calculations: 70.3 kg (155 lb)   Energy Need Method: Kcal/kg Energy Calorie Requirements (kcal): 5178-7925  Weight Used For Protein Calculations: 70.3 kg (155 lb)  Protein Requirements: 56-70  Estimated Fluid Requirement Method: RDA Method    RDA Method (mL): 1757       Nutrition by Nursing  Diet/Nutrition Received: 2 gram sodium, restrict fluids  Intake (%): 50%  Diet/Feeding Assistance: tray set-up  Diet/Feeding Tolerance: poor  Last Bowel Movement: 02/12/24                Nutrition Follow-Up  RD Follow-up?: Yes      Nutrition Discharge Planning: noted possible plans to [facility]; continue diet as tolerated            "

## 2024-02-13 NOTE — PLAN OF CARE
Problem: Physical Therapy  Goal: Physical Therapy Goal  Description: LTG - 3 weeks  1. Pt svn with bed mob.-PROGRESSING  2. Pt SBA with transfers.-PROGRESSING  3. Pt will amb 100 ft with SBA using RW.-PROGRESSING  4. Pt will have 4/5 gross BLE strength.-PROGRESSING  5. Pt's Tinetti test score will be at least 12/28 points.-NOT MET  Outcome: Ongoing, Progressing

## 2024-02-13 NOTE — PT/OT/SLP PROGRESS
Occupational Therapy   Treatment    Name: Courtney Pickens  MRN: [id number]  Admitting Diagnosis:  Weakness       Recommendations:     Discharge Recommendations:    Discharge Equipment Recommendations:   (TBD)  Barriers to discharge:  None    Assessment:     Courtney Pickens is a 83 y.o. female with a medical diagnosis of Weakness.  She presents with needing to go to the bathroom. Performance deficits affecting function are weakness, impaired endurance, impaired self care skills, impaired functional mobility, gait instability, impaired balance, impaired cognition, decreased safety awareness, edema, impaired cardiopulmonary response to activity.     Rehab Prognosis:  Poor due to pt poor cognitive recognition of her deficits and needs in addition to her confusion and increased agitation upon being overhwhelmed; patient would benefit from short term acute skilled OT services to address these deficits and reach maximum level of function.       Plan:     Patient to be seen 5 x/week to address the above listed problems via self-care/home management, therapeutic activities, therapeutic exercises  Plan of Care Expires: 02/29/24  Plan of Care Reviewed with: patient, caregiver    Subjective     Chief Complaint: feel terrible  Patient/Family Comments/goals: family not present; pt not providing insight into goals  Pain/Comfort:       Objective:     Communicated with: pt and nursing staff prior to session.  Patient found HOB elevated with oxygen upon OT entry to room.    General Precautions: Standard, fall, contact    Orthopedic Precautions:Full weight bearing  Braces: N/A  Respiratory Status: Nasal cannula, flow 2 L/min     Occupational Performance:     Bed Mobility:    Patient completed Rolling/Turning to Left with  minimum assistance  Patient completed Rolling/Turning to Right with moderate assistance  Patient completed Scooting/Bridging with moderate assistance  Patient completed Supine to Sit with minimum assistance  Patient  "completed Sit to Supine with moderate assistance     Functional Mobility/Transfers:  Patient completed Sit <> Stand Transfer with contact guard assistance  with  rolling walker   Patient completed Toilet Transfer Step Transfer technique with contact guard assistance with  rolling walker  Functional Mobility: pt able to walk across room with RW and CGA with moderate cueing for safety to sinkside to wash hands    Activities of Daily Living:  Toileting: moderate assistance needed moderate assist to manage LB clothing and brief; needed cueing for wiping frontal saulo area, needed max assist to wipe posterior saulo area      AMPAC 6 Click ADL:      Treatment & Education:  ADL session as noted before.  Pt BLE still very edematous. Pt reports "feeling real bad."  Pt found to be asking to toilet, so OT assisted pt in hopes of providing ADL tx session.  Pt agreeable but insisting "I can't do that" when asked to manage her LB clothing prior to toileting.    Upon getting back to bedside, she required HEAVY moderate assist to get back into proper position in bed.  She began to show irritation as OT cued her to assist herself in getting back in midline of bed.  OT used calming techniques and redirection to attempt getting pt cooperation.  Finally, pt able to assist in her mobility back to center of bed.  OT raised her legs and kept head at comfortable level, leaving pt with her company that was present.    Patient left HOB elevated with call button in reach and bed alarm on    GOALS:   Multidisciplinary Problems       Occupational Therapy Goals          Problem: Occupational Therapy    Goal Priority Disciplines Outcome Interventions   Occupational Therapy Goal     OT, PT/OT Ongoing, Progressing    Description: STG: within 2 weeks  Pt will perform grooming with setup and independence ONGOING/PROGRESSING  Pt will bathe with SVN ONGOING/PROGRESSING  Pt will perform UE dressing with SVN ONGOING/PROGRESSING  Pt will perform LE dressing " with SVN ONGOING/PROGRESSING  Pt will sit EOB x 30 min with SVN assistance ONGOING/PROGRESSING  Pt will transfer bed/chair/bsc with SVN ONGOING/PROGRESSING  Pt will perform standing task x 5 min with SVN assistance ONGOING/PROGRESSING  Pt will tolerate 35 minutes of tx without fatigue ONGOING/PROGRESSING      LT.Restore to max I with self care and mobility.                         Time Tracking:     OT Date of Treatment: 24  OT Start Time: 1248  OT Stop Time: 1307  OT Total Time (min): 19 min    Billable Minutes:Self Care/Home Management 19    OT/CRISTINA: OT          2024

## 2024-02-13 NOTE — PT/OT/SLP PROGRESS
Physical Therapy Treatment    Patient Name:  Courtney Pickens   MRN:  [id number]    Recommendations:     Discharge Recommendations: Low Intensity Therapy  Discharge Equipment Recommendations: none  Barriers to discharge: Inaccessible home, Decreased caregiver support, and high falls risk    Assessment:     Courtney Pickens is a 83 y.o. female admitted with a medical diagnosis of Weakness.  She presents with the following impairments/functional limitations: weakness, impaired endurance, impaired functional mobility, impaired self care skills, gait instability, impaired balance, impaired cognition, decreased coordination, decreased lower extremity function, decreased ROM, edema, impaired cardiopulmonary response to activity. Patient refused gait training and standing this date, but she was agreeable to therapeutic exercises in the bed. Patient with no complaints of increased pain or adverse effects after completion of treatment.     Rehab Prognosis: Fair; patient would benefit from acute skilled PT services to address these deficits and reach maximum level of function.    Recent Surgery: * No surgery found *      Plan:     During this hospitalization, patient to be seen 5 x/week to address the identified rehab impairments via gait training, therapeutic activities, therapeutic exercises, neuromuscular re-education and progress toward the following goals:    Plan of Care Expires:       Subjective     Chief Complaint: Patient complained of back pain this date.   Patient/Family Comments/goals: Possible dc to [facility].  Pain/Comfort:  Pain Rating 1:  (Patient did not give pain rating, but she stated that she had pain in her back.)      Objective:     Communicated with RN prior to session. Patient found HOB elevated with bed alarm upon PTA entry to room.     General Precautions: Standard, fall  Orthopedic Precautions:    Braces:    Respiratory Status: Nasal cannula, flow 2 L/min     Functional Mobility:  Bed Mobility:   not  competed this date.       AM-PAC 6 CLICK MOBILITY  Turning over in bed (including adjusting bedclothes, sheets and blankets)?: 4  Sitting down on and standing up from a chair with arms (e.g., wheelchair, bedside commode, etc.): 3  Moving from lying on back to sitting on the side of the bed?: 3  Moving to and from a bed to a chair (including a wheelchair)?: 3  Need to walk in hospital room?: 3  Climbing 3-5 steps with a railing?: 1  Basic Mobility Total Score: 17       Treatment & Education:  Patient performed the following exercises in bed on BLE: SLRs 5x, SAQs 10x2, Hip ADD 10x, glute sets 10x, ankle pumps 30x    Patient left HOB elevated with all lines intact and call button in reach.    GOALS:   Multidisciplinary Problems       Physical Therapy Goals          Problem: Physical Therapy    Goal Priority Disciplines Outcome Goal Variances Interventions   Physical Therapy Goal     PT, PT/OT Ongoing, Progressing     Description: LTG - 3 weeks  1. Pt svn with bed mob.-PROGRESSING  2. Pt SBA with transfers.-PROGRESSING  3. Pt will amb 100 ft with SBA using RW.-PROGRESSING  4. Pt will have 4/5 gross BLE strength.-PROGRESSING  5. Pt's Tinetti test score will be at least 12/28 points.-NOT MET                       Time Tracking:     PT Received On: 02/13/24  PT Start Time: 1538     PT Stop Time: 1550  PT Total Time (min): 12 min     Billable Minutes: Therapeutic Exercise 12    Treatment Type: Treatment  PT/PTA: PTA     Number of PTA visits since last PT visit: 1 02/13/2024

## 2024-02-13 NOTE — PLAN OF CARE
Problem: Fluid Volume Excess  Goal: Fluid Balance  Outcome: Ongoing, Progressing  Intervention: Monitor and Manage Hypervolemia  Flowsheets (Taken 2/13/2024 0674)  Skin Protection:   adhesive use limited   incontinence pads utilized   silicone foam dressing in place   skin-to-device areas padded  Fluid/Electrolyte Management: fluids restricted

## 2024-02-13 NOTE — PLAN OF CARE
[facility]   Interdisciplinary Team Meeting    Patient: Courtney Pickens   Today's Date: 2/13/2024   Estimated D/C Date: TBD        Physician: Austin Valencia DO Nurse Practitioner: Rebecca Brooks NP   Pharmacy: Azar BatresD Unit Director: Lawanda Zelaya RN   : Miguel A Rangel RN Physical/Occupational Therapy: Fay Arguello OT   Speech Therapy: ST Michaela Activity Therapy: Angie Akhtar   Nursing: Lawanda Zelaya RN  Respiratory: Lydia Garcia, RT Dietary: Yocasta Brady RD  Other: n/a     Nursing  New Symptoms/Problems: n/a  Last Bowel Movement: 02/12/24  Urine: continent  Engel: No  Bowel: continent   Constipated: No  Diarrhea: No   Isolation: No  Wound Care: No  Wound Location/Tx: n/a  Cognition: Memory issues  Aspiration Precautions: No  Comment(s): n/a    Respiratory:   O2 Device: Nasal Cannula  O2 Flow: 2L  SpO2: 98%  Neb Tx: No  Comment(s): n/a    Dietary    Nutrition: Regular  Comment(s): n/a    Speech Therapy  Speech/Swallowing:mild difficulty with PO intake of liquids at times/ineffective airway protection  Comment(s): cognitive: safety/recall    Physical Therapy  Gait/Assistive Device: ambulatorywith RW ELOS: Plan to DC     Transfers: min assist  Bed Mobility: Minimal Assistance Range of Motion/Restrictions: n/a  Comment(s): n/a     Occupational Therapy  Eating/Grooming: Supervision or Set-up Assistance Toileting: Moderate Assistance   Bathing: Minimal Assistance Dressing (Upper Body): Minimal Assistance   Dressing (Lower Body): Moderate Assistance Comment(s): n/a     Activity Therapy  Level of participation: Active participation  Comment(s): n/a    Pharmacy   Medication Changes (see MD orders in chart): No  Labs Reviewed: Yes  New Lab Orders: No  Comment(s): n/a      Tx Plan/Recommendations reviewed with family and/or patient on (date) 2/13.  Additional family Conference/Training: n/a  D/C  Plan/Recommendations:  nursing home placement   DOREEN:   Comment(s): n/a

## 2024-02-14 VITALS
DIASTOLIC BLOOD PRESSURE: 62 MMHG | BODY MASS INDEX: 28.84 KG/M2 | HEART RATE: 52 BPM | HEIGHT: 62 IN | TEMPERATURE: 97 F | WEIGHT: 156.75 LBS | SYSTOLIC BLOOD PRESSURE: 139 MMHG | OXYGEN SATURATION: 100 % | RESPIRATION RATE: 16 BRPM

## 2024-02-14 PROCEDURE — 97530 THERAPEUTIC ACTIVITIES: CPT

## 2024-02-14 PROCEDURE — 25000003 PHARM REV CODE 250

## 2024-02-14 PROCEDURE — 97110 THERAPEUTIC EXERCISES: CPT

## 2024-02-14 PROCEDURE — 25000003 PHARM REV CODE 250: Performed by: HOSPITALIST

## 2024-02-14 PROCEDURE — 27000958

## 2024-02-14 PROCEDURE — 99900035 HC TECH TIME PER 15 MIN (STAT)

## 2024-02-14 PROCEDURE — 27000221 HC OXYGEN, UP TO 24 HOURS

## 2024-02-14 PROCEDURE — 25000003 PHARM REV CODE 250: Performed by: NURSE PRACTITIONER

## 2024-02-14 PROCEDURE — 99316 NF DSCHRG MGMT 30 MIN+: CPT | Mod: ,,, | Performed by: HOSPITALIST

## 2024-02-14 PROCEDURE — 94640 AIRWAY INHALATION TREATMENT: CPT

## 2024-02-14 PROCEDURE — 94761 N-INVAS EAR/PLS OXIMETRY MLT: CPT

## 2024-02-14 PROCEDURE — 25000242 PHARM REV CODE 250 ALT 637 W/ HCPCS

## 2024-02-14 RX ADMIN — SERTRALINE HYDROCHLORIDE 25 MG: 25 TABLET ORAL at 09:02

## 2024-02-14 RX ADMIN — THYROID, PORCINE 120 MG: 60 TABLET ORAL at 05:02

## 2024-02-14 RX ADMIN — GENTAMICIN SULFATE 1 DROP: 3 SOLUTION/ DROPS OPHTHALMIC at 05:02

## 2024-02-14 RX ADMIN — VANCOMYCIN HYDROCHLORIDE 125 MG: 125 CAPSULE ORAL at 05:02

## 2024-02-14 RX ADMIN — FERROUS SULFATE TAB 325 MG (65 MG ELEMENTAL FE) 1 EACH: 325 (65 FE) TAB at 09:02

## 2024-02-14 RX ADMIN — APIXABAN 2.5 MG: 2.5 TABLET, FILM COATED ORAL at 09:02

## 2024-02-14 RX ADMIN — BUDESONIDE INHALATION 0.5 MG: 0.5 SUSPENSION RESPIRATORY (INHALATION) at 08:02

## 2024-02-14 RX ADMIN — DULOXETINE HYDROCHLORIDE 30 MG: 30 CAPSULE, DELAYED RELEASE ORAL at 09:02

## 2024-02-14 RX ADMIN — PANTOPRAZOLE SODIUM 40 MG: 40 TABLET, DELAYED RELEASE ORAL at 09:02

## 2024-02-14 RX ADMIN — FUROSEMIDE 60 MG: 40 TABLET ORAL at 09:02

## 2024-02-14 RX ADMIN — GENTAMICIN SULFATE 1 DROP: 3 SOLUTION/ DROPS OPHTHALMIC at 09:02

## 2024-02-14 RX ADMIN — IPRATROPIUM BROMIDE AND ALBUTEROL SULFATE 3 ML: 2.5; .5 SOLUTION RESPIRATORY (INHALATION) at 08:02

## 2024-02-14 RX ADMIN — ROPINIROLE HYDROCHLORIDE 2 MG: 1 TABLET, FILM COATED ORAL at 09:02

## 2024-02-14 RX ADMIN — DILTIAZEM HYDROCHLORIDE 120 MG: 120 CAPSULE, COATED, EXTENDED RELEASE ORAL at 09:02

## 2024-02-14 RX ADMIN — MICONAZOLE NITRATE ANTIFUNGAL POWDER: 2 POWDER TOPICAL at 09:02

## 2024-02-14 RX ADMIN — VANCOMYCIN HYDROCHLORIDE 125 MG: 125 CAPSULE ORAL at 11:02

## 2024-02-14 RX ADMIN — POTASSIUM CHLORIDE 20 MEQ: 750 CAPSULE, EXTENDED RELEASE ORAL at 09:02

## 2024-02-14 NOTE — PT/OT/SLP PROGRESS
Physical Therapy Treatment    Patient Name:  Courtney Pickens   MRN:  [id number]    Recommendations:     Discharge Recommendations: Low Intensity Therapy  Discharge Equipment Recommendations: none  Barriers to discharge: Inaccessible home and falls risk    Assessment:     Courtney Pickens is a 83 y.o. female admitted with a medical diagnosis of Weakness.  She presents with the following impairments/functional limitations: weakness, impaired endurance, impaired functional mobility, gait instability, impaired balance, decreased lower extremity function, edema, impaired cardiopulmonary response to activity .    SPO2 monitored throughout treatment session.    Rehab Prognosis: Fair; patient would benefit from acute skilled PT services to address these deficits and reach maximum level of function.    Recent Surgery: * No surgery found *      Plan:     During this hospitalization, patient to be seen 5 x/week to address the identified rehab impairments via gait training, therapeutic activities, therapeutic exercises, neuromuscular re-education and progress toward the following goals:    Plan of Care Expires:       Subjective     Chief Complaint: Pt with no new c/o. States she is feeling better than she has the past 2 days.  Patient/Family Comments/goals: Family has taken pt to another hosp.  Pain/Comfort:  Pain Rating 1: 0/10      Objective:     Communicated with RT [place] during to session. She monitored pt's SPO2 and recommended O2 by lowered back down to 3LPM.  Patient found up in chair with chair check, oxygen upon PT entry to room.     General Precautions: Standard, fall, respiratory  Orthopedic Precautions:    Braces:    Respiratory Status: Nasal cannula, flow 2 L/min     Functional Mobility:  Transfers:     Sit to Stand:  minimum assistance and of 2 persons with rolling walker  Gait: Pt amb'd 10 ft with min Ax1 using RW/wc trail/O2 at 3L      AM-PAC 6 CLICK MOBILITY  Turning over in bed (including adjusting  "bedclothes, sheets and blankets)?: 3  Sitting down on and standing up from a chair with arms (e.g., wheelchair, bedside commode, etc.): 2  Moving from lying on back to sitting on the side of the bed?: 2  Moving to and from a bed to a chair (including a wheelchair)?: 2  Need to walk in hospital room?: 2  Climbing 3-5 steps with a railing?: 1  Basic Mobility Total Score: 12       Treatment & Education:  SPO2 monitored throughout treatment session. At rest on 2L, SPO2 88% which decreased to 85% after Nu-step.. PT increased it to 3L and it stayed 85%. PT increased to 4L and it increased to 90%. RT used a different hand and finger and was able to get 96% on 4L so O2 decreased back down to 3L. Pt's nail bed had thick fungus and RT thought PT was not getting a good reading through it. Nu-Step at Level 1.0 x 3 minutes. Pt received passive stretching jeffrey HS and jeffrey calves 1x30" each. Seated ther ex x 10 reps each BLE: LAQ, resisted hip add, PF over edge of 3" pad, marching, GS.    Patient left up in chair with all lines intact, chair alarm on, and OT present..    GOALS:   Multidisciplinary Problems       Physical Therapy Goals          Problem: Physical Therapy    Goal Priority Disciplines Outcome Goal Variances Interventions   Physical Therapy Goal     PT, PT/OT Ongoing, Progressing     Description: LTG - 3 weeks  1. Pt svn with bed mob.-PROGRESSING  2. Pt SBA with transfers.-PROGRESSING  3. Pt will amb 100 ft with SBA using RW.-PROGRESSING  4. Pt will have 4/5 gross BLE strength.-PROGRESSING  5. Pt's Tinetti test score will be at least 12/28 points.-NOT MET                       Time Tracking:     PT Received On: 02/14/24  PT Start Time: 0957     PT Stop Time: 1032  PT Total Time (min): 35 min     Billable Minutes: Gait Training 3, Therapeutic Activity 15, Therapeutic Exercise 17, and Total Time 35 min    Treatment Type: Treatment  PT/PTA: PT     Number of PTA visits since last PT visit: 0     02/14/2024  "

## 2024-02-14 NOTE — PLAN OF CARE
Problem: Adult Inpatient Plan of Care  Goal: Plan of Care Review  Outcome: Ongoing, Progressing  Goal: Patient-Specific Goal (Individualized)  Outcome: Ongoing, Progressing  Goal: Absence of Hospital-Acquired Illness or Injury  Outcome: Ongoing, Progressing  Goal: Optimal Comfort and Wellbeing  Outcome: Ongoing, Progressing  Goal: Readiness for Transition of Care  Outcome: Ongoing, Progressing     Problem: Fluid Imbalance (Pneumonia)  Goal: Fluid Balance  Outcome: Ongoing, Progressing     Problem: Infection (Pneumonia)  Goal: Resolution of Infection Signs and Symptoms  Outcome: Ongoing, Progressing     Problem: Respiratory Compromise (Pneumonia)  Goal: Effective Oxygenation and Ventilation  Outcome: Ongoing, Progressing     Problem: Skin Injury Risk Increased  Goal: Skin Health and Integrity  Outcome: Ongoing, Progressing     Problem: Breathing Pattern Ineffective  Goal: Effective Breathing Pattern  Outcome: Ongoing, Progressing     Problem: Gas Exchange Impaired  Goal: Optimal Gas Exchange  Outcome: Ongoing, Progressing     Problem: Infection  Goal: Absence of Infection Signs and Symptoms  Outcome: Ongoing, Progressing     Problem: Behavior Regulation Impairment (Dementia Signs/Symptoms)  Goal: Improved Behavioral Control (Dementia Signs/Symptoms)  Outcome: Ongoing, Progressing     Problem: Cognitive Impairment (Dementia Signs/Symptoms)  Goal: Optimized Cognitive Function (Dementia Signs/Symptoms)  Outcome: Ongoing, Progressing     Problem: Mood Impairment (Dementia Signs/Symptoms)  Goal: Improved Mood Symptoms (Dementia Signs/Symptoms)  Outcome: Ongoing, Progressing     Problem: Nutrition Imbalance (Dementia Signs/Symptoms)  Goal: Optimized Nutrition Intake (Dementia Signs/Symptoms)  Outcome: Ongoing, Progressing     Problem: Sleep Disturbance (Dementia Signs/Symptoms)  Goal: Improved Sleep (Dementia Signs/Symptoms)  Outcome: Ongoing, Progressing     Problem: Social or Functional Impairment (Dementia  Signs/Symptoms)  Goal: Enhanced Social or Functional Skills and Ability (Dementia Signs/Symptoms)  Outcome: Ongoing, Progressing     Problem: Fall Injury Risk  Goal: Absence of Fall and Fall-Related Injury  Outcome: Ongoing, Progressing     Problem: Fluid Volume Excess  Goal: Fluid Balance  Outcome: Ongoing, Progressing

## 2024-02-14 NOTE — DISCHARGE SUMMARY
[facility] - Medical Surgical Unit  Hospital Medicine  Discharge Summary      Patient Name: Courtney Pickens  MRN: [id number]  [facility]: [id number]  Patient Class: IP- Swing  Admission Date: 2/8/2024  Hospital Length of Stay: 6 days  Discharge Date and Time:  02/14/2024 11:19 AM  Attending Physician: Austin Valencia DO   Discharging Provider: Austin Valencia DO  Primary Care Provider: Bertha Mccall FNP    Primary Care Team: Networked reference to record PCT     HPI:   Mrs. Pickens is a pleasant 82 y/o  female with a PMHx of ILYA, Pulmonary HTN, Atrial Fibrillation, Thyroid Disease, Depression, Moderate Tricuspid Regurgitation, Moderate Aortic Regurgitation, COPD, and Venous Insufficiency was admitted to [facility] on 1/31/2024 for volume overload despite diuresis, kidney failure, metabolic encephalopathy and C-Difficle Colilitis.  Patient had a temporary dialysis catheter placed and underwent three rounds of hemodialysis before having a successful lasix trial. Patient was evaluated by Cardiology, echocardiogram showed a LVEF of 55-60% and worsening Tricuspid Regurgitation with a RSVP of 93 mmHg. Patient was placed on a 1,000 ml/24 hr fluid restriction. Patient requiring supplemental oxygen via nasal cannula at 2 LMP. Kidney function improved and anasarca resolved, patient was discharged here to [facility] for Inpatient Swing Bed Services.     * No surgery found *      Hospital Course:   ADAN Baptiste RN reports is unable to swallow Kcl tablets, changed to efferK    2/12 NO major issues.  Says she wants to go home.  Fluid status seems stable.     Renal function worse despite meds and fluid restriction.  Family wants to DC and take to another facility.      Goals of Care Treatment Preferences:  Code Status: DNR      Consults:   Consults (From admission, onward)          Status Ordering Provider     Inpatient consult to Registered Dietitian/Nutritionist  Once        Provider:  (Not yet  assigned)    Completed AMBROSIO PATEL            No new Assessment & Plan notes have been filed under this hospital service since the last note was generated.  Service: Hospital Medicine    Final Active Diagnoses:    Diagnosis Date Noted POA    PRINCIPAL PROBLEM:  Weakness [R53.1] 02/09/2024 Yes    Dementia without behavioral disturbance [F03.90] 02/09/2024 Yes    Hypothyroid [E03.9] 12/06/2021 Yes    Pulmonary HTN [I27.20] 12/02/2021 Yes    Right heart failure [I50.810] 12/02/2021 Yes      Problems Resolved During this Admission:       Discharged Condition: stable    Disposition: Home or Self Care    Follow Up:    Patient Instructions:   No discharge procedures on file.    Significant Diagnostic Studies: Labs: BMP:   Recent Labs   Lab 02/13/24  1212   *   *   K 4.5   CL 95*   CO2 29   BUN 34*   CREATININE 2.01*   CALCIUM 9.1       Pending Diagnostic Studies:       None           Medications:  Reconciled Home Medications:      Medication List        CONTINUE taking these medications      chlorthalidone 25 MG Tab  Commonly known as: HYGROTEN  Take 1 tablet (25 mg total) by mouth once daily.     furosemide 40 MG tablet  Commonly known as: LASIX  Take 1 tablet (40 mg total) by mouth once daily.     levothyroxine 112 MCG tablet  Commonly known as: SYNTHROID  Take 1 tablet (112 mcg total) by mouth before breakfast.     losartan 100 MG tablet  Commonly known as: COZAAR  Take 1 tablet (100 mg total) by mouth once daily.     pantoprazole 40 MG tablet  Commonly known as: PROTONIX  Take 1 tablet (40 mg total) by mouth once daily.     potassium bicarbonate disintegrating tablet  Commonly known as: K-LYTE  Take 1 tablet (25 mEq total) by mouth once daily.     rOPINIRole 2 MG tablet  Commonly known as: REQUIP  Take 2 mg by mouth 3 (three) times daily.     spironolactone 25 MG tablet  Commonly known as: ALDACTONE  Take 1 tablet (25 mg total) by mouth once daily.     venlafaxine 150 MG Cp24  Commonly known as:  EFFEXOR-XR  Take 150 mg by mouth once daily.              Indwelling Lines/Drains at time of discharge:   Lines/Drains/Airways       None                   Time spent on the discharge of patient: 33 minutes         Austin Valencia DO  Department of Hospital Medicine  [facility] - Medical Surgical [facility]

## 2024-02-14 NOTE — PLAN OF CARE
Problem: Adult Inpatient Plan of Care  Goal: Plan of Care Review  2/14/2024 1139 by Landy Lyon RN  Outcome: Adequate for Care Transition  2/14/2024 1048 by Landy Lyon RN  Outcome: Ongoing, Progressing  Goal: Patient-Specific Goal (Individualized)  2/14/2024 1139 by Landy Lyon RN  Outcome: Adequate for Care Transition  2/14/2024 1048 by Landy Lyon RN  Outcome: Ongoing, Progressing  Goal: Absence of Hospital-Acquired Illness or Injury  2/14/2024 1139 by Landy Lyon RN  Outcome: Adequate for Care Transition  2/14/2024 1048 by Landy Lyon RN  Outcome: Ongoing, Progressing  Goal: Optimal Comfort and Wellbeing  2/14/2024 1139 by Landy Lyon RN  Outcome: Adequate for Care Transition  2/14/2024 1048 by Landy Lyon RN  Outcome: Ongoing, Progressing  Goal: Readiness for Transition of Care  2/14/2024 1139 by Landy Lyon RN  Outcome: Adequate for Care Transition  2/14/2024 1048 by Landy Lyon RN  Outcome: Ongoing, Progressing     Problem: Fluid Imbalance (Pneumonia)  Goal: Fluid Balance  2/14/2024 1139 by Landy Lyon RN  Outcome: Adequate for Care Transition  2/14/2024 1048 by Landy Lyon RN  Outcome: Ongoing, Progressing     Problem: Infection (Pneumonia)  Goal: Resolution of Infection Signs and Symptoms  2/14/2024 1139 by Landy Lyon RN  Outcome: Adequate for Care Transition  2/14/2024 1048 by Landy Lyon RN  Outcome: Ongoing, Progressing     Problem: Respiratory Compromise (Pneumonia)  Goal: Effective Oxygenation and Ventilation  2/14/2024 1139 by Landy Lyon RN  Outcome: Adequate for Care Transition  2/14/2024 1048 by Landy Lyon RN  Outcome: Ongoing, Progressing     Problem: Skin Injury Risk Increased  Goal: Skin Health and Integrity  2/14/2024 1139 by Landy Lyon RN  Outcome: Adequate for Care Transition  2/14/2024 1048 by Landy Lyon RN  Outcome: Ongoing, Progressing     Problem: Breathing Pattern Ineffective  Goal: Effective Breathing  Pattern  2/14/2024 1139 by Landy Lyon RN  Outcome: Adequate for Care Transition  2/14/2024 1048 by Landy Lyon RN  Outcome: Ongoing, Progressing     Problem: Gas Exchange Impaired  Goal: Optimal Gas Exchange  2/14/2024 1139 by Landy Lyon RN  Outcome: Adequate for Care Transition  2/14/2024 1048 by Landy Lyon RN  Outcome: Ongoing, Progressing     Problem: Infection  Goal: Absence of Infection Signs and Symptoms  2/14/2024 1139 by Landy Lyon RN  Outcome: Adequate for Care Transition  2/14/2024 1048 by Landy Lyon RN  Outcome: Ongoing, Progressing     Problem: Behavior Regulation Impairment (Dementia Signs/Symptoms)  Goal: Improved Behavioral Control (Dementia Signs/Symptoms)  2/14/2024 1139 by Landy Lyon RN  Outcome: Adequate for Care Transition  2/14/2024 1048 by Landy Lyon RN  Outcome: Ongoing, Progressing     Problem: Cognitive Impairment (Dementia Signs/Symptoms)  Goal: Optimized Cognitive Function (Dementia Signs/Symptoms)  2/14/2024 1139 by Landy Lyon RN  Outcome: Adequate for Care Transition  2/14/2024 1048 by Landy yLon RN  Outcome: Ongoing, Progressing     Problem: Mood Impairment (Dementia Signs/Symptoms)  Goal: Improved Mood Symptoms (Dementia Signs/Symptoms)  2/14/2024 1139 by Landy Lyon RN  Outcome: Adequate for Care Transition  2/14/2024 1048 by Landy Lyon RN  Outcome: Ongoing, Progressing     Problem: Nutrition Imbalance (Dementia Signs/Symptoms)  Goal: Optimized Nutrition Intake (Dementia Signs/Symptoms)  2/14/2024 1139 by Landy Lyon RN  Outcome: Adequate for Care Transition  2/14/2024 1048 by Landy Lyon RN  Outcome: Ongoing, Progressing     Problem: Sleep Disturbance (Dementia Signs/Symptoms)  Goal: Improved Sleep (Dementia Signs/Symptoms)  2/14/2024 1139 by Landy Lyon, ARDEN  Outcome: Adequate for Care Transition  2/14/2024 1048 by Landy Lyon RN  Outcome: Ongoing, Progressing     Problem: Social or Functional Impairment  (Dementia Signs/Symptoms)  Goal: Enhanced Social or Functional Skills and Ability (Dementia Signs/Symptoms)  2/14/2024 1139 by Landy Lyon, RN  Outcome: Adequate for Care Transition  2/14/2024 1048 by Landy Lyon RN  Outcome: Ongoing, Progressing     Problem: Fall Injury Risk  Goal: Absence of Fall and Fall-Related Injury  2/14/2024 1139 by Landy Lyon, ARDEN  Outcome: Adequate for Care Transition  2/14/2024 1048 by Landy Lyon, RN  Outcome: Ongoing, Progressing     Problem: Fluid Volume Excess  Goal: Fluid Balance  2/14/2024 1139 by Landy Lyon, RN  Outcome: Adequate for Care Transition  2/14/2024 1048 by Landy Lyon RN  Outcome: Ongoing, Progressing

## 2024-02-14 NOTE — PLAN OF CARE
[facility] - Medical Surgical Unit  Discharge Final Note    Primary Care Provider: Bertha Mccall FNP    Expected Discharge Date: 2/14/2024    Final Discharge Note (most recent)       Final Note - 02/14/24 1042          Final Note    Assessment Type Final Discharge Note (P)      Anticipated Discharge Disposition -- (P)    discharged- per DIL request to evaluate at higher level of care       Post-Acute Status    Post-Acute Authorization Other (P)    n/a    Other Status No Post-Acute Service Needs (P)                      Important Message from Medicare  Important Message from Medicare regarding Discharge Appeal Rights: Given to patient/caregiver, Explained to patient/caregiver, Signed/date by patient/caregiver (NOMNC signed)     Date IMM was signed: 02/14/24  Time IMM was signed: 1042    Patient discharged per her family's request. They intend to take her to a hospital in [place] to receive care for fluid overload

## 2024-02-14 NOTE — PT/OT/SLP PROGRESS
"Speech Language Pathology Treatment    Patient Name:  Courtney Pickens   MRN:  [id number]  Admitting Diagnosis: Weakness    Recommendations:                 General Recommendations:  Dysphagia therapy and Cognitive-linguistic therapy  Diet recommendations:  Regular, Liquid Diet Level: Thin   Aspiration Precautions: 1 bite/sip at a time, Avoid talking while eating, and Eliminate distractions   General Precautions: Standard, fall  Communication strategies:  provide increased time to answer    Assessment:     Courtney Pickens is a 83 y.o. female with an SLP diagnosis of Cognitive-Linguistic Impairment.    She presents with SWB admission post acute renal failure with hospitalization and onset of Cdiff. Pt with overall decline in independence and strength.     Pt presents with deficits in recall , reasoning, safety awareness affecting ability to return home independently as pt lived prior.     ST noted pt with Modified Barium Swallow completed at prior hospital with recommendation regular diet, nectar liquids secondary to microaspiration thin liquids at times  per ST on 2/2. Physician dc orders indicated to resume regular diet. ST conversed with daughter, daughter confirmed swallow assessment and indicated difficulty at first during hospital, however improved over time. ST to modify POC to include airway protection goals.   Subjective     Pt alert with family present, ST return at later time due to visit.   Patient goals: "get me home"      Pain/Comfort:       Respiratory Status: Nasal cannula, flow 2 L/min    Objective:     Has the patient been evaluated by SLP for swallowing?   Yes  Keep patient NPO? No   Current Respiratory Status:        Pt with intake thin liquids x3 via straw, delayed double swallow with throat clearing noted.  ST implemented cup edge x5 with no overt s/sx aspiration noted. Large sip x2 with multiple swallows required to clear.   Task for airway protection completed across 3 sets of 10 via variety of " tasks.    Pt  oriented to person, place, time, reasoning for hospitalization with independence this date.Cues for orientation to year x1.   Pt with recall task for 1 detail with 75% accuracy.  Pt with completion of attention and topic maintenance task with 65% accuracy given redirection at times.       Goals:   Multidisciplinary Problems       SLP Goals          Problem: SLP    Goal Priority Disciplines Outcome   SLP Goal     SLP Ongoing, Progressing   Description: Pt will demonstrate orientation to person, place, time with 80% accuracy.  Pt will recall 2-3 details with 80% accuracy.   Pt will demonstrate ID safe/unsafe situations with 70% accuracy.   Pt will maintain attention to task 4-5 minutes with 80% accuracy.   Pt will demonstrate problem solving and reasoning skills with 80% accuracy.   Patient will complete vocal fold adduction/airway protection tasks with 90% accuracy                       Plan:     Patient to be seen:  5 x/week   Plan of Care expires:  03/01/24  Plan of Care reviewed with:  patient   SLP Follow-Up:  Yes       Discharge recommendations:  Low Intensity Therapy   Barriers to Discharge:  Level of Skilled Assistance Needed      Time Tracking:     SLP Treatment Date:   02/13/24  Speech Start Time:  1502  Speech Stop Time:  1526     Speech Total Time (min):  24 min    Billable Minutes:Speech Therapy 10, Swallow Dysfunction 14    02/13/2024

## 2024-02-14 NOTE — PT/OT/SLP PROGRESS
Occupational Therapy   Treatment    Name: Courtney Pickens  MRN: [id number]  Admitting Diagnosis:  Weakness       Recommendations:     Discharge Recommendations:    Discharge Equipment Recommendations:   (TBD)  Barriers to discharge:  None    Assessment:     Courtney Pickens is a 83 y.o. female with a medical diagnosis of Weakness.  She presents with her DIL stating she is having to take patient to [facility] ER today due to her kidney/fluid status, see physician notes. Performance deficits affecting function are weakness, impaired endurance, impaired self care skills, impaired functional mobility, gait instability, impaired balance, impaired cognition, decreased safety awareness, edema, impaired cardiopulmonary response to activity.     Rehab Prognosis:  Fair; patient will likely need to be discharged from therapy services and possibly this hospital to receive higher level of care.  See nursing notes/physician notes.    Plan:     Patient to be seen 5 x/week to address the above listed problems via self-care/home management, therapeutic activities, therapeutic exercises  Plan of Care Expires: 02/29/24  Plan of Care Reviewed with: patient, caregiver    Subjective     Chief Complaint: increased fluid, feeling worse overall  Patient/Family Comments/goals: plan now is to likely admit to permanent LTC  Pain/Comfort:   Fluid level rising, see nursing notes.    Objective:     Communicated with: pt and PT prior to session.  Patient found up in chair after completing PT with oxygen upon OT entry to room.  PT relates that pt is needing increased oxygen rate today during therapy.    General Precautions: Standard, fall, contact    Orthopedic Precautions:Full weight bearing  Braces: N/A  Respiratory Status: Nasal cannula, flow 3 L/min     Occupational Performance:     Bed Mobility:    Pt already up in w/c    Functional Mobility/Transfers:  See PT    Activities of Daily Living:  Overall min a/mod a      [facility] 6 Click ADL:       Treatment & Education:  To improve endurance, strength, OT facilitated pt with:  RED PUTTY to simulate kitchen prep task and improve FM and     To improve reach, AROM, FM/ and trunk rotation with core forward leaning engagement:   Clothespins vertical board BUE weighted with no wristweights, pulling them off vertical board from various levels, though pt could not reach the top two tiers.  seated Reciprocal pulleys x 4 min in sh flexion and abduction      Patient left up in chair with call button in reach and family present    GOALS:   Multidisciplinary Problems       Occupational Therapy Goals          Problem: Occupational Therapy    Goal Priority Disciplines Outcome Interventions   Occupational Therapy Goal     OT, PT/OT Ongoing, Progressing    Description: STG: within 2 weeks  Pt will perform grooming with setup and independence ONGOING/PROGRESSING  Pt will bathe with SVN ONGOING/PROGRESSING  Pt will perform UE dressing with SVN ONGOING/PROGRESSING  Pt will perform LE dressing with SVN ONGOING/PROGRESSING  Pt will sit EOB x 30 min with SVN assistance ONGOING/PROGRESSING  Pt will transfer bed/chair/bsc with SVN ONGOING/PROGRESSING  Pt will perform standing task x 5 min with SVN assistance ONGOING/PROGRESSING  Pt will tolerate 35 minutes of tx without fatigue ONGOING/PROGRESSING      LT.Restore to max I with self care and mobility.                         Time Tracking:     OT Date of Treatment: 24  OT Start Time: 1034  OT Stop Time: 1059  OT Total Time (min): 25 min    Billable Minutes:Therapeutic Activity 15  Therapeutic Exercise 10    OT/CRISTINA: OT          2024

## 2024-02-14 NOTE — NURSING
Discharge instructions and chart printed for daughter in law to take with her to [facility]. Daughter in law to drive her there.

## 2024-02-15 ENCOUNTER — PATIENT OUTREACH (OUTPATIENT)
Dept: ADMINISTRATIVE | Facility: CLINIC | Age: 84
End: 2024-02-15